# Patient Record
Sex: FEMALE | Race: WHITE | NOT HISPANIC OR LATINO | URBAN - METROPOLITAN AREA
[De-identification: names, ages, dates, MRNs, and addresses within clinical notes are randomized per-mention and may not be internally consistent; named-entity substitution may affect disease eponyms.]

---

## 2017-10-16 ENCOUNTER — APPOINTMENT (OUTPATIENT)
Age: 72
Setting detail: DERMATOLOGY
End: 2017-10-17

## 2017-10-16 DIAGNOSIS — Z85.820 PERSONAL HISTORY OF MALIGNANT MELANOMA OF SKIN: ICD-10-CM

## 2017-10-16 DIAGNOSIS — L57.0 ACTINIC KERATOSIS: ICD-10-CM

## 2017-10-16 DIAGNOSIS — L81.4 OTHER MELANIN HYPERPIGMENTATION: ICD-10-CM

## 2017-10-16 DIAGNOSIS — Z85.828 PERSONAL HISTORY OF OTHER MALIGNANT NEOPLASM OF SKIN: ICD-10-CM

## 2017-10-16 DIAGNOSIS — L82.1 OTHER SEBORRHEIC KERATOSIS: ICD-10-CM

## 2017-10-16 DIAGNOSIS — L57.8 OTHER SKIN CHANGES DUE TO CHRONIC EXPOSURE TO NONIONIZING RADIATION: ICD-10-CM

## 2017-10-16 DIAGNOSIS — D18.0 HEMANGIOMA: ICD-10-CM

## 2017-10-16 PROBLEM — D48.5 NEOPLASM OF UNCERTAIN BEHAVIOR OF SKIN: Status: ACTIVE | Noted: 2017-10-16

## 2017-10-16 PROBLEM — D18.01 HEMANGIOMA OF SKIN AND SUBCUTANEOUS TISSUE: Status: ACTIVE | Noted: 2017-10-16

## 2017-10-16 PROCEDURE — 17000 DESTRUCT PREMALG LESION: CPT

## 2017-10-16 PROCEDURE — OTHER RECOMMENDATIONS: OTHER

## 2017-10-16 PROCEDURE — 11100: CPT | Mod: 59

## 2017-10-16 PROCEDURE — 17003 DESTRUCT PREMALG LES 2-14: CPT

## 2017-10-16 PROCEDURE — OTHER COUNSELING: OTHER

## 2017-10-16 PROCEDURE — 99214 OFFICE O/P EST MOD 30 MIN: CPT | Mod: 25

## 2017-10-16 PROCEDURE — OTHER REASSURANCE: OTHER

## 2017-10-16 PROCEDURE — OTHER TREATMENT REGIMEN: OTHER

## 2017-10-16 PROCEDURE — OTHER BIOPSY BY SHAVE METHOD: OTHER

## 2017-10-16 PROCEDURE — OTHER LIQUID NITROGEN: OTHER

## 2017-10-16 ASSESSMENT — LOCATION ZONE DERM
LOCATION ZONE: TRUNK
LOCATION ZONE: LEG
LOCATION ZONE: NECK
LOCATION ZONE: FACE
LOCATION ZONE: ARM

## 2017-10-16 ASSESSMENT — LOCATION SIMPLE DESCRIPTION DERM
LOCATION SIMPLE: RIGHT PRETIBIAL REGION
LOCATION SIMPLE: LEFT UPPER ARM
LOCATION SIMPLE: LEFT PRETIBIAL REGION
LOCATION SIMPLE: LEFT POSTERIOR UPPER ARM
LOCATION SIMPLE: LEFT FOREARM
LOCATION SIMPLE: CHEST
LOCATION SIMPLE: RIGHT ANTERIOR NECK
LOCATION SIMPLE: RIGHT POSTERIOR UPPER ARM
LOCATION SIMPLE: LEFT CHEEK
LOCATION SIMPLE: RIGHT UPPER BACK
LOCATION SIMPLE: RIGHT FOREARM

## 2017-10-16 ASSESSMENT — LOCATION DETAILED DESCRIPTION DERM
LOCATION DETAILED: LEFT INFERIOR CENTRAL MALAR CHEEK
LOCATION DETAILED: MIDDLE STERNUM
LOCATION DETAILED: RIGHT PROXIMAL POSTERIOR UPPER ARM
LOCATION DETAILED: RIGHT SUPERIOR MEDIAL UPPER BACK
LOCATION DETAILED: LEFT PROXIMAL DORSAL FOREARM
LOCATION DETAILED: RIGHT PROXIMAL PRETIBIAL REGION
LOCATION DETAILED: LEFT ANTERIOR PROXIMAL UPPER ARM
LOCATION DETAILED: RIGHT PROXIMAL DORSAL FOREARM
LOCATION DETAILED: LEFT DISTAL POSTERIOR UPPER ARM
LOCATION DETAILED: LEFT PROXIMAL PRETIBIAL REGION
LOCATION DETAILED: RIGHT INFERIOR ANTERIOR NECK

## 2017-10-16 NOTE — PROCEDURE: RECOMMENDATIONS
Recommendation Preamble: The following recommendations were made during the visit:
Detail Level: Simple
Recommendations (Free Text): GRACY xiong with Tita

## 2018-11-01 ENCOUNTER — APPOINTMENT (OUTPATIENT)
Age: 73
Setting detail: DERMATOLOGY
End: 2018-11-02

## 2018-11-01 DIAGNOSIS — L57.0 ACTINIC KERATOSIS: ICD-10-CM

## 2018-11-01 DIAGNOSIS — D22 MELANOCYTIC NEVI: ICD-10-CM

## 2018-11-01 DIAGNOSIS — L57.8 OTHER SKIN CHANGES DUE TO CHRONIC EXPOSURE TO NONIONIZING RADIATION: ICD-10-CM

## 2018-11-01 DIAGNOSIS — D485 NEOPLASM OF UNCERTAIN BEHAVIOR OF SKIN: ICD-10-CM

## 2018-11-01 DIAGNOSIS — L82.1 OTHER SEBORRHEIC KERATOSIS: ICD-10-CM

## 2018-11-01 DIAGNOSIS — D18.0 HEMANGIOMA: ICD-10-CM

## 2018-11-01 DIAGNOSIS — T300 ERYTHEMA [FIRST DEGREE], UNSPECIFIED SITE: ICD-10-CM

## 2018-11-01 DIAGNOSIS — Z85.828 PERSONAL HISTORY OF OTHER MALIGNANT NEOPLASM OF SKIN: ICD-10-CM

## 2018-11-01 PROBLEM — D18.01 HEMANGIOMA OF SKIN AND SUBCUTANEOUS TISSUE: Status: ACTIVE | Noted: 2018-11-01

## 2018-11-01 PROBLEM — D48.5 NEOPLASM OF UNCERTAIN BEHAVIOR OF SKIN: Status: ACTIVE | Noted: 2018-11-01

## 2018-11-01 PROBLEM — T22.111A BURN OF FIRST DEGREE OF RIGHT FOREARM, INITIAL ENCOUNTER: Status: ACTIVE | Noted: 2018-11-01

## 2018-11-01 PROBLEM — D22.5 MELANOCYTIC NEVI OF TRUNK: Status: ACTIVE | Noted: 2018-11-01

## 2018-11-01 PROCEDURE — 99214 OFFICE O/P EST MOD 30 MIN: CPT | Mod: 25

## 2018-11-01 PROCEDURE — OTHER LIQUID NITROGEN: OTHER

## 2018-11-01 PROCEDURE — 11100: CPT | Mod: 59

## 2018-11-01 PROCEDURE — 17003 DESTRUCT PREMALG LES 2-14: CPT

## 2018-11-01 PROCEDURE — OTHER COUNSELING: OTHER

## 2018-11-01 PROCEDURE — 11101: CPT

## 2018-11-01 PROCEDURE — 17000 DESTRUCT PREMALG LESION: CPT

## 2018-11-01 PROCEDURE — OTHER BIOPSY BY SHAVE METHOD: OTHER

## 2018-11-01 PROCEDURE — OTHER MIPS QUALITY: OTHER

## 2018-11-01 ASSESSMENT — LOCATION ZONE DERM
LOCATION ZONE: TRUNK
LOCATION ZONE: FACE
LOCATION ZONE: ARM
LOCATION ZONE: LEG

## 2018-11-01 ASSESSMENT — LOCATION SIMPLE DESCRIPTION DERM
LOCATION SIMPLE: LEFT POSTERIOR UPPER ARM
LOCATION SIMPLE: RIGHT LOWER BACK
LOCATION SIMPLE: RIGHT UPPER BACK
LOCATION SIMPLE: LEFT UPPER BACK
LOCATION SIMPLE: RIGHT FOREARM
LOCATION SIMPLE: CHEST
LOCATION SIMPLE: RIGHT CALF
LOCATION SIMPLE: INFERIOR FOREHEAD
LOCATION SIMPLE: LEFT FOREHEAD
LOCATION SIMPLE: LEFT PRETIBIAL REGION

## 2018-11-01 ASSESSMENT — LOCATION DETAILED DESCRIPTION DERM
LOCATION DETAILED: INFERIOR MID FOREHEAD
LOCATION DETAILED: LEFT SUPERIOR UPPER BACK
LOCATION DETAILED: LEFT MEDIAL SUPERIOR CHEST
LOCATION DETAILED: RIGHT MID-UPPER BACK
LOCATION DETAILED: LEFT PROXIMAL PRETIBIAL REGION
LOCATION DETAILED: RIGHT INFERIOR MEDIAL MIDBACK
LOCATION DETAILED: LEFT FOREHEAD
LOCATION DETAILED: LEFT DISTAL POSTERIOR UPPER ARM
LOCATION DETAILED: RIGHT PROXIMAL CALF
LOCATION DETAILED: RIGHT VENTRAL DISTAL FOREARM

## 2018-11-01 NOTE — PROCEDURE: BIOPSY BY SHAVE METHOD
Hemostasis: Drysol
Cryotherapy Text: The wound bed was treated with cryotherapy after the biopsy was performed.
Bill 55625 For Specimen Handling/Conveyance To Laboratory?: no
Curettage Text: The wound bed was treated with curettage after the biopsy was performed.
Silver Nitrate Text: The wound bed was treated with silver nitrate after the biopsy was performed.
Electrodesiccation And Curettage Text: The wound bed was treated with electrodesiccation and curettage after the biopsy was performed.
Size Of Lesion In Cm: 0.6
Consent: Written consent was obtained and risks were reviewed including but not limited to scarring, infection, bleeding, scabbing, incomplete removal, nerve damage and allergy to anesthesia.
Dressing: bandage
Electrodesiccation Text: The wound bed was treated with electrodesiccation after the biopsy was performed.
Anesthesia Type: 1% lidocaine with epinephrine
Additional Anesthesia Volume In Cc (Will Not Render If 0): 0
Type Of Destruction Used: Curettage
Was A Bandage Applied: Yes
Depth Of Biopsy: dermis
Post-Care Instructions: I reviewed with the patient in detail post-care instructions. Patient is to keep the biopsy site dry overnight, and then apply bacitracin twice daily until healed. Patient may apply hydrogen peroxide soaks to remove any crusting.
Billing Type: Third-Party Bill
Wound Care: Petrolatum
Notification Instructions: Patient will be notified of biopsy results. However, patient instructed to call the office if not contacted within 2 weeks.
Biopsy Type: H and E
Anesthesia Volume In Cc: 0.5
Biopsy Method: Dermablade
Detail Level: Detailed

## 2018-12-03 ENCOUNTER — APPOINTMENT (OUTPATIENT)
Age: 73
Setting detail: DERMATOLOGY
End: 2018-12-04

## 2018-12-03 PROBLEM — C44.729 SQUAMOUS CELL CARCINOMA OF SKIN OF LEFT LOWER LIMB, INCLUDING HIP: Status: ACTIVE | Noted: 2018-12-03

## 2018-12-03 PROCEDURE — OTHER COUNSELING: OTHER

## 2018-12-03 PROCEDURE — 99213 OFFICE O/P EST LOW 20 MIN: CPT

## 2018-12-03 PROCEDURE — OTHER CONSULTATION FOR RADIOTHERAPY: OTHER

## 2018-12-05 ENCOUNTER — APPOINTMENT (OUTPATIENT)
Age: 73
Setting detail: DERMATOLOGY
End: 2018-12-05

## 2018-12-05 PROBLEM — C44.729 SQUAMOUS CELL CARCINOMA OF SKIN OF LEFT LOWER LIMB, INCLUDING HIP: Status: ACTIVE | Noted: 2018-12-05

## 2018-12-05 PROCEDURE — 77261 THER RADIOLOGY TX PLNG SMPL: CPT

## 2018-12-05 PROCEDURE — OTHER SUPERFICIAL RADIATION TREATMENT: OTHER

## 2018-12-05 PROCEDURE — 77300 RADIATION THERAPY DOSE PLAN: CPT

## 2018-12-05 PROCEDURE — 77401 RADIATION TX DELIVERY SUPFC: CPT

## 2018-12-05 PROCEDURE — 77280 THER RAD SIMULAJ FIELD SMPL: CPT | Mod: 90

## 2018-12-05 PROCEDURE — OTHER FOLLOW UP FOR NEXT VISIT: OTHER

## 2018-12-05 PROCEDURE — OTHER TREATMENT REGIMEN: OTHER

## 2018-12-05 PROCEDURE — 77334 RADIATION TREATMENT AID(S): CPT

## 2018-12-05 PROCEDURE — G6001 ECHO GUIDANCE RADIOTHERAPY: HCPCS

## 2018-12-05 NOTE — PROCEDURE: SUPERFICIAL RADIATION TREATMENT
Total Number Of Fractions Rx 4: 15
Bill For Radiation Treatment: Yes
Field Size (Applicator): 3.0 cm
Port Dimensions-Y Axis In Cm: 2.5
Energy (Optional-Please Include Units): 70kV
Fractions / Week Rx 4: 5
Custom Shielding Afterword Text Will Not Be Included With Simple Simulations (X X Y Cm............): port to correlate with the lesion size, including treatment margin. The custom lead shield is adequate to accommodate the appropriate applicator and provide adequate shielding around the treatment site. Additional shielding (as noted below) is used to protect sensitive, normal tissues.
Initial Radiation Treatment Planning (Will Render If Bill Simulation = Yes): Nila Gonzales had a complete consultation regarding all applicable modalities for the treatment of their skin cancer and based on a variety of factors including the type of tumor, size, and location, the relevant medical history as well as local tissue factors, the functional status of the individual, the ability to perform necessary postoperative wound instructions and the need for simultaneous treatments as well as overall wound healing status, it was determined that the patient would begin radiation therapy treatment for skin cancer.  A full simulation and treatment device design was performed including the determination and formulation of appropriate simple and complex devices including lead shield of 0.762 mm thickness to form molded customized shielding to specifically correlate with the lesion size including treatment margin.  The custom lead shield is adequate to accommodate the appropriate applicator and provide adequate shielding around the treatment site.  The specific field applicator, shields, and devices both simple and complex as well as the specific patient setup is outlined below.  The patient was given a full consent for superficial radiation to both verbally and in writing and the full determination of patient's eligibility for treatment and selection is outlined on the patient eligibility and treatment selection form.  The specific superficial radiotherapy prescription was determined and was documented on the superficial radiotherapy prescription form.  A treatment calculation was also performed and documented on the treatment calculation form.  Based on the prescription, the patient was scheduled for a series of fractional treatments.
Total Number Of Fractions: 13
Render Additional Prescriptions In Note?: No
Shielding Size (Optional- Include Units) Rx 2: 2.5 cm x 2.5cm
Dimensions-X Axis In Cm: 1.5
Dose Per Fractionation In Cgy (Optional): 382.80
Energy (Optional-Please Include Units) Rx 2: 50kV
Shielding Size (Optional- Include Units): 2.5cm x 2.5cm
Number Of Days Off Treatment: 1
Treatment Margins In Cm: 0.5
Custom Shielding Preamble Text Will Not Be Included With Simple Simulations (.......... X X Y Cm): A lead shield of 0.762 mm thickness is utilized to form a molded, custom shield with a
Treatment Device Design After Initial Simulation Justification (Will Render If Bill For Treatment Devices = Yes): Nila Gonzales is status post radiation simulation and is evaluated as to the use of additional devices for shielding and placement for radiation therapy.
Day Of The Week Treatment Administered: Wednesday
Treatment Time / Fractionation (Optional- Include Units) Rx 2: .34
Cumulative Dose In Cgy (Optional): 4976.4
Simple Simulation Preamble Text Will Be Included With Simple Simulations (.......... Indications): Simple simulation was performed today for the following reasons:
Time Dose Fractionation (Optional- Include Units If Applicable): 99
Treatment Time / Fractionation (Optional- Include Units): .60 miin
Detail Level: Detailed
Time Dose Fractionation (Optional- Include Units If Applicable) Rx 2: 64
Intro Statement (Will Not Render If Left Blank): Nila Montanez is undergoing superficial radiation therapy for skin cancer and presents for weekly evaluation and management.  Per protocol and as documented on the flow sheet, the patient was questioned as to subjective redness, pruritus, pain, drainage, fatigue, or any other symptoms.  Objectively, the radiation area was evaluated with regards to erythema, atrophy, scale, crusting, erosion, ulceration, edema, purpura, tenderness, warmth, drainage, and any other findings.  The plan was extensively reviewed including the dose, and dosing schedule.  The simulation and clinical setup was also reviewed as was the external and any internal shields and based on this review the appropriateness and sufficiency of treatment was determined. Initiated SRT.\\nRTOG = 1
Fractions / Week: 2
Functional Status: 0 (fully active)
Fractions / Week Rx 2: 3
Computed Treatment Time In Min (Will Render The Same As Calculated Treatment Time If Left Blank): .61
Treatment Time In Min (Optional): .60
Assessment: Appropriate reaction
Daily Fractionated Dose (Optional- Include Units) Rx 2: 255.96
Patient Positioning: Supine
Total Dose (Optional-Please Include Units) Rx 2: 3789.3
Additional Prescription Justification Text: If there is any interruption in treatment exceeding 5 days please see Decay and Dose Adjustment Calculation and complete treatment under Prescription 2.

## 2018-12-05 NOTE — PROCEDURE: TREATMENT REGIMEN
Plan: Per the request of Nicole Mortensen Pastor, Lynn was seen today for Superficial Radiation Therapy requiring simulation\\n(CPT® 61042) in preparation for treatment of specific diseased site(s). Simulation is necessary to determine\\ncorrect patient and treatment portal positioning, deliver safe and effective radiation therapy. A high frequency\\nultrasound image was acquired today for three dimensional evaluation of tumor volume and response to\\ntreatment, in addition, geometric accuracy of field placement (CPT® ). Physician evaluation of the\\nultrasound tumor depth will be ongoing through course of treatment, and is deemed medically necessary ensuring\\nefficacy of treatment. Today’s image and setup was evaluated determining continuation of treatment with the\\ncurrent plan, or necessary changes as appropriate. All appropriate custom blocking and treatment parameters\\nverified by the Radiation Therapist\\nToday’s visit is for Simulation and planning for radiation therapy. Question were answered and concerns were\\naddress. Patient was evaluated based on listed criteria and is a suitable candidate to begin SRT. Ultrasound\\nwas used to confirm treatment location and determine depth of treatment.\\n
Plan: Evaluation and Management\\n\\nThe patient is undergoing superficial radiation therapy for skin cancer and presents for weekly evaluation and management. Per protocol and as documented on the flow sheet, the patient was questioned as to subjective redness, pruritus, pain, drainage, fatigue, or any other symptoms. Objectively, the radiation area was evaluated with regards to erythema, atrophy, scale, crusting, erosion, ulceration, edema, purpura, tenderness, warmth, drainage, and any other findings. The plan was extensively reviewed including the dose, and dosing schedule. The simulation and clinical setup was also reviewed as was the external and any internal shields and based on this review the appropriateness and sufficiency of treatment was determined.\\n\\nSubjective: \\nRedness\\n\\nAssessment: \\nAppropriate reaction\\n\\nPlan: \\nDose Reviewed\\nDosimetry Reviewed\\nSimulation Reviewed\\nClinical Set-up Reviewed\\nExternal Shields Reviewed\\nContinue Current Treatment Regimen\\n\\n\\n\\nPlan: Treatment Regimen\\n\\nPlan: Per the request of Dr Washington, Patient has been treated today with superficial radiation therapy for non-melanoma skin cancer. \\n\\nWritten informed consent has been previously obtained from this patient for this treatment. This consent is documented in the patient’s chart. The patient gave verbal consent to continue treatment today.\\n\\nThe patient was treated with a specific radiation dose and setup as prescribed by the provider listed on this visit note. A Radiation Therapist performed administration of radiation. The treatment parameters and cumulative dose are indicated above. \\n\\nPrior to administering the radiation, the patient underwent a verification simulation defining relevant normal and abnormal target anatomy, and acquiring images and data necessary to develop optimal radiation treatment process for the patient. This process includes verification of the treatment port(s) and proper treatment positioning. All treatment ports were photographed. The patient’s customized lead blocking was confirmed. Considering, superficial radiotherapy setup is a clinical setup, this requires physician and radiation therapist to clarify location interest being treated against initial images, pathology and patient anatomy. Care was taken ensuring fields treated were geometrically accurate and properly positioned using daily verification simulation. This process is also utilized to determine if any changes are necessary. These steps are therefore medically necessary ensuring safe and effective administration of radiation.\\n\\nA high frequency ultrasound image was acquired today for two-dimensional evaluation of the tumor volume and response to treatment, in addition to geometric accuracy of field placement. The daily field placement is separate and distinct from the initial simulation, and is an important task in providing safe administration of radiation therapy. Physician evaluation of the ultrasound tumor depth will be ongoing throughout the course of treatment, and is deemed medically necessary in order to ensure the efficacy of treatment. Today’s image was evaluated for determination of continuation of treatment with the current plan or with necessary changes as appropriate. Additionally, the use of visualization and targeted assessment allows the patient to be able to see their cancer(s) progress, encouraging patient to complete full course of radiotherapy. \\n\\nPer Dr. Washington, continued daily US guidance and clinical setup simulation is required for field placement, measurement of tumor depth, progress and acute effect monitoring. Evaluation prior to treatment for response and reaction to SRT based on current fraction and cumulative dose with a visual inspection and ultrasound demonstrates a normal expected response. RTOG Acute Radiation Morbidity Score = 1 Superficial Radiation Therapy will continue as planned.
Detail Level: Zone

## 2018-12-07 ENCOUNTER — APPOINTMENT (OUTPATIENT)
Age: 73
Setting detail: DERMATOLOGY
End: 2018-12-07

## 2018-12-07 PROBLEM — C44.729 SQUAMOUS CELL CARCINOMA OF SKIN OF LEFT LOWER LIMB, INCLUDING HIP: Status: ACTIVE | Noted: 2018-12-07

## 2018-12-07 PROCEDURE — OTHER SUPERFICIAL RADIATION TREATMENT: OTHER

## 2018-12-07 PROCEDURE — OTHER TREATMENT REGIMEN: OTHER

## 2018-12-07 PROCEDURE — 77401 RADIATION TX DELIVERY SUPFC: CPT

## 2018-12-07 PROCEDURE — 77280 THER RAD SIMULAJ FIELD SMPL: CPT

## 2018-12-07 PROCEDURE — G6001 ECHO GUIDANCE RADIOTHERAPY: HCPCS

## 2018-12-07 PROCEDURE — OTHER FOLLOW UP FOR NEXT VISIT: OTHER

## 2018-12-07 NOTE — PROCEDURE: SUPERFICIAL RADIATION TREATMENT
Fractionation Number (Evaluation): 5
Daily Fractionated Dose (Optional- Include Units): 382.80
Bill For Radiation Treatment: Yes
Prescription Used: 1
Treatment Margins In Cm: 0.5
Daily Fractionated Dose (Optional- Include Units) Rx 2: 255.96
Total Number Of Fractions Rx 2: 15
Include Rx 4 When Rendering Additional Prescriptions: No
Treatment Time In Min (Optional): .60
Field Size (Applicator): 3.0 cm
Total Dose (Optional-Please Include Units): 4976.4
Total Dose (Optional-Please Include Units) Rx 2: 3789.3
Fractions / Week Rx 2: 3
Energy (Optional-Please Include Units): 70kV
Detail Level: Detailed
Dimensions-X Axis In Cm: 1.5
Shielding Size (Optional- Include Units) Rx 2: 2.5 cm x 2.5cm
Simple Simulation Afterword Text Will Be Included With Simple Simulations (Indications............): Nila Gonzales had a complete consultation regarding all applicable modalities for the treatment of their skin cancer and based on a variety of factors including the type of tumor, size, and location, the relevant medical history as well as local tissue factors, the functional status of the individual, the ability to perform necessary postoperative wound instructions and the need for simultaneous treatments as well as overall wound healing status, it was determined that the patient would begin radiation therapy treatment for skin cancer.  A full simulation and treatment device design was performed including the determination and formulation of appropriate simple and complex devices including lead shield of 0.762 mm thickness to form molded customized shielding to specifically correlate with the lesion size including treatment margin.  The custom lead shield is adequate to accommodate the appropriate applicator and provide adequate shielding around the treatment site.  The specific field applicator, shields, and devices both simple and complex as well as the specific patient setup is outlined below.  The patient was given a full consent for superficial radiation to both verbally and in writing and the full determination of patient's eligibility for treatment and selection is outlined on the patient eligibility and treatment selection form.  The specific superficial radiotherapy prescription was determined and was documented on the superficial radiotherapy prescription form.  A treatment calculation was also performed and documented on the treatment calculation form.  Based on the prescription, the patient was scheduled for a series of fractional treatments.
Port Dimensions-Y Axis In Cm: 2.5
Time Dose Fractionation (Optional- Include Units If Applicable) Rx 2: 64
Custom Shielding Preamble Text Will Not Be Included With Simple Simulations (.......... X X Y Cm): A lead shield of 0.762 mm thickness is utilized to form a molded, custom shield with a
Simple Simulation Preamble Text Will Be Included With Simple Simulations (.......... Indications): Simple simulation was performed today for the following reasons:
Total Number Of Fractions: 13
Patient Positioning: Supine
Time Dose Fractionation (Optional- Include Units If Applicable): 99
Shielding Size (Optional- Include Units): 2.5cm x 2.5cm
Computed Treatment Time In Min (Will Render The Same As Calculated Treatment Time If Left Blank): .61
Assessment: Appropriate reaction
Functional Status: 0 (fully active)
Additional Prescription Justification Text: If there is any interruption in treatment exceeding 5 days please see Decay and Dose Adjustment Calculation and complete treatment under Prescription 2.
Cumulative Dose In Cgy (Optional): 765.60
Day Of The Week Treatment Administered: Friday
Treatment Time / Fractionation (Optional- Include Units) Rx 2: .34
Intro Statement (Will Not Render If Left Blank): Nila Montanez is undergoing superficial radiation therapy for skin cancer and presents for weekly evaluation and management.  Per protocol and as documented on the flow sheet, the patient was questioned as to subjective redness, pruritus, pain, drainage, fatigue, or any other symptoms.  Objectively, the radiation area was evaluated with regards to erythema, atrophy, scale, crusting, erosion, ulceration, edema, purpura, tenderness, warmth, drainage, and any other findings.  The plan was extensively reviewed including the dose, and dosing schedule.  The simulation and clinical setup was also reviewed as was the external and any internal shields and based on this review the appropriateness and sufficiency of treatment was determined. Initiated SRT.\\nRTOG = 1
Treatment Time / Fractionation (Optional- Include Units): .60 miin
Treatment Device Design After Initial Simulation Justification (Will Render If Bill For Treatment Devices = Yes): Nila Gonzales is status post radiation simulation and is evaluated as to the use of additional devices for shielding and placement for radiation therapy.
Energy (Optional-Please Include Units) Rx 2: 50kV
Custom Shielding Afterword Text Will Not Be Included With Simple Simulations (X X Y Cm............): port to correlate with the lesion size, including treatment margin. The custom lead shield is adequate to accommodate the appropriate applicator and provide adequate shielding around the treatment site. Additional shielding (as noted below) is used to protect sensitive, normal tissues.
Fractions / Week: 2

## 2018-12-07 NOTE — PROCEDURE: TREATMENT REGIMEN
Detail Level: Zone
Plan: Evaluation and Management\\n\\nThe patient is undergoing superficial radiation therapy for skin cancer and presents for weekly evaluation and management. Per protocol and as documented on the flow sheet, the patient was questioned as to subjective redness, pruritus, pain, drainage, fatigue, or any other symptoms. Objectively, the radiation area was evaluated with regards to erythema, atrophy, scale, crusting, erosion, ulceration, edema, purpura, tenderness, warmth, drainage, and any other findings. The plan was extensively reviewed including the dose, and dosing schedule. The simulation and clinical setup was also reviewed as was the external and any internal shields and based on this review the appropriateness and sufficiency of treatment was determined.\\n\\nSubjective: \\nRedness\\n\\nAssessment: \\nAppropriate reaction\\n\\nPlan: \\nDose Reviewed\\nDosimetry Reviewed\\nSimulation Reviewed\\nClinical Set-up Reviewed\\nExternal Shields Reviewed\\nContinue Current Treatment Regimen\\n\\n\\n\\nPlan: Treatment Regimen\\n\\nPlan: Per the request of Dr Washington, Patient has been treated today with superficial radiation therapy for non-melanoma skin cancer. \\n\\nWritten informed consent has been previously obtained from this patient for this treatment. This consent is documented in the patient’s chart. The patient gave verbal consent to continue treatment today.\\n\\nThe patient was treated with a specific radiation dose and setup as prescribed by the provider listed on this visit note. A Radiation Therapist performed administration of radiation. The treatment parameters and cumulative dose are indicated above. \\n\\nPrior to administering the radiation, the patient underwent a verification simulation defining relevant normal and abnormal target anatomy, and acquiring images and data necessary to develop optimal radiation treatment process for the patient. This process includes verification of the treatment port(s) and proper treatment positioning. All treatment ports were photographed. The patient’s customized lead blocking was confirmed. Considering, superficial radiotherapy setup is a clinical setup, this requires physician and radiation therapist to clarify location interest being treated against initial images, pathology and patient anatomy. Care was taken ensuring fields treated were geometrically accurate and properly positioned using daily verification simulation. This process is also utilized to determine if any changes are necessary. These steps are therefore medically necessary ensuring safe and effective administration of radiation.\\n\\nA high frequency ultrasound image was acquired today for two-dimensional evaluation of the tumor volume and response to treatment, in addition to geometric accuracy of field placement. The daily field placement is separate and distinct from the initial simulation, and is an important task in providing safe administration of radiation therapy. Physician evaluation of the ultrasound tumor depth will be ongoing throughout the course of treatment, and is deemed medically necessary in order to ensure the efficacy of treatment. Today’s image was evaluated for determination of continuation of treatment with the current plan or with necessary changes as appropriate. Additionally, the use of visualization and targeted assessment allows the patient to be able to see their cancer(s) progress, encouraging patient to complete full course of radiotherapy. \\n\\nPer Dr. Washington, continued daily US guidance and clinical setup simulation is required for field placement, measurement of tumor depth, progress and acute effect monitoring. Evaluation prior to treatment for response and reaction to SRT based on current fraction and cumulative dose with a visual inspection and ultrasound demonstrates a normal expected response. RTOG Acute Radiation Morbidity Score = 1 Superficial Radiation Therapy will continue as planned.  \\nUS Depth 0.52mm, SUSAN Equivocal

## 2018-12-10 ENCOUNTER — APPOINTMENT (OUTPATIENT)
Age: 73
Setting detail: DERMATOLOGY
End: 2018-12-13

## 2018-12-10 PROBLEM — C44.729 SQUAMOUS CELL CARCINOMA OF SKIN OF LEFT LOWER LIMB, INCLUDING HIP: Status: ACTIVE | Noted: 2018-12-10

## 2018-12-10 PROCEDURE — 77401 RADIATION TX DELIVERY SUPFC: CPT

## 2018-12-10 PROCEDURE — G6001 ECHO GUIDANCE RADIOTHERAPY: HCPCS

## 2018-12-10 PROCEDURE — OTHER TREATMENT REGIMEN: OTHER

## 2018-12-10 PROCEDURE — 77280 THER RAD SIMULAJ FIELD SMPL: CPT

## 2018-12-10 PROCEDURE — OTHER SUPERFICIAL RADIATION TREATMENT: OTHER

## 2018-12-10 PROCEDURE — OTHER FOLLOW UP FOR NEXT VISIT: OTHER

## 2018-12-10 NOTE — PROCEDURE: SUPERFICIAL RADIATION TREATMENT
Total Number Of Fractions Rx 4: 15
Include Rx 4 When Rendering Additional Prescriptions: No
Fractions / Week Rx 3: 5
Number Of Treatment Days: 1
Custom Shielding Afterword Text Will Not Be Included With Simple Simulations (X X Y Cm............): port to correlate with the lesion size, including treatment margin. The custom lead shield is adequate to accommodate the appropriate applicator and provide adequate shielding around the treatment site. Additional shielding (as noted below) is used to protect sensitive, normal tissues.
Shielding Size (Optional- Include Units): 2.5cm x 2.5cm
Day Of The Week Treatment Administered: Monday
Field Size (Applicator) Rx 2: 3.0 cm
Energy (Optional-Please Include Units): 70kV
Additional Prescription Justification Text: If there is any interruption in treatment exceeding 5 days please see Decay and Dose Adjustment Calculation and complete treatment under Prescription 2.
Port Dimensions-Y Axis In Cm: 2.5
Functional Status: 0 (fully active)
Patient Positioning: Supine
Fractions / Week Rx 2: 3
Treatment Device Design After Initial Simulation Justification (Will Render If Bill For Treatment Devices = Yes): Nila Gonzales is status post radiation simulation and is evaluated as to the use of additional devices for shielding and placement for radiation therapy.
Total Number Of Fractions: 13
Simple Simulation Preamble Text Will Be Included With Simple Simulations (.......... Indications): Simple simulation was performed today for the following reasons:
Detail Level: Detailed
Treatment Margins In Cm: 0.5
Time Dose Fractionation (Optional- Include Units If Applicable): 99
Treatment Time In Min (Optional): .60
Treatment Time / Fractionation (Optional- Include Units) Rx 2: .34
Initial Radiation Treatment Planning (Will Render If Bill Simulation = Yes): Nila Gonzales had a complete consultation regarding all applicable modalities for the treatment of their skin cancer and based on a variety of factors including the type of tumor, size, and location, the relevant medical history as well as local tissue factors, the functional status of the individual, the ability to perform necessary postoperative wound instructions and the need for simultaneous treatments as well as overall wound healing status, it was determined that the patient would begin radiation therapy treatment for skin cancer.  A full simulation and treatment device design was performed including the determination and formulation of appropriate simple and complex devices including lead shield of 0.762 mm thickness to form molded customized shielding to specifically correlate with the lesion size including treatment margin.  The custom lead shield is adequate to accommodate the appropriate applicator and provide adequate shielding around the treatment site.  The specific field applicator, shields, and devices both simple and complex as well as the specific patient setup is outlined below.  The patient was given a full consent for superficial radiation to both verbally and in writing and the full determination of patient's eligibility for treatment and selection is outlined on the patient eligibility and treatment selection form.  The specific superficial radiotherapy prescription was determined and was documented on the superficial radiotherapy prescription form.  A treatment calculation was also performed and documented on the treatment calculation form.  Based on the prescription, the patient was scheduled for a series of fractional treatments.
Computed Treatment Time In Min (Will Render The Same As Calculated Treatment Time If Left Blank): .61
Total Dose (Optional-Please Include Units): 4976.4
Fractions / Week: 2
Dose / Tx In Cgy (Optional): 382.80
Treatment Time / Fractionation (Optional- Include Units): .60 miin
Dimensions-X Axis In Cm: 1.5
Cumulative Dose In Cgy (Optional): 1148.4
Custom Shielding Preamble Text Will Not Be Included With Simple Simulations (.......... X X Y Cm): A lead shield of 0.762 mm thickness is utilized to form a molded, custom shield with a
Daily Fractionated Dose (Optional- Include Units) Rx 2: 255.96
Assessment: Appropriate reaction
Total Dose (Optional-Please Include Units) Rx 2: 3789.3
Shielding Size (Optional- Include Units) Rx 2: 2.5 cm x 2.5cm
Energy (Optional-Please Include Units) Rx 2: 50kV
Bill For Radiation Treatment: Yes
Time Dose Fractionation (Optional- Include Units If Applicable) Rx 2: 64

## 2018-12-10 NOTE — PROCEDURE: TREATMENT REGIMEN
Detail Level: Zone
Plan: Treatment Regimen\\n\\nPlan: Per the request of Dr Washington, Patient has been treated today with superficial radiation therapy for non-melanoma skin cancer. \\n\\nWritten informed consent has been previously obtained from this patient for this treatment. This consent is documented in the patient’s chart. The patient gave verbal consent to continue treatment today.\\n\\nThe patient was treated with a specific radiation dose and setup as prescribed by the provider listed on this visit note. A Radiation Therapist performed administration of radiation. The treatment parameters and cumulative dose are indicated above. \\n\\nPrior to administering the radiation, the patient underwent a verification simulation defining relevant normal and abnormal target anatomy, and acquiring images and data necessary to develop optimal radiation treatment process for the patient. This process includes verification of the treatment port(s) and proper treatment positioning. All treatment ports were photographed. The patient’s customized lead blocking was confirmed. Considering, superficial radiotherapy setup is a clinical setup, this requires physician and radiation therapist to clarify location interest being treated against initial images, pathology and patient anatomy. Care was taken ensuring fields treated were geometrically accurate and properly positioned using daily verification simulation. This process is also utilized to determine if any changes are necessary. These steps are therefore medically necessary ensuring safe and effective administration of radiation.\\n\\nA high frequency ultrasound image was acquired today for two-dimensional evaluation of the tumor volume and response to treatment, in addition to geometric accuracy of field placement. The daily field placement is separate and distinct from the initial simulation, and is an important task in providing safe administration of radiation therapy. Physician evaluation of the ultrasound tumor depth will be ongoing throughout the course of treatment, and is deemed medically necessary in order to ensure the efficacy of treatment. Today’s image was evaluated for determination of continuation of treatment with the current plan or with necessary changes as appropriate. Additionally, the use of visualization and targeted assessment allows the patient to be able to see their cancer(s) progress, encouraging patient to complete full course of radiotherapy. \\n\\nPer Dr. Washington, continued daily US guidance and clinical setup simulation is required for field placement, measurement of tumor depth, progress and acute effect monitoring. Evaluation prior to treatment for response and reaction to SRT based on current fraction and cumulative dose with a visual inspection and ultrasound demonstrates a normal expected response. RTOG Acute Radiation Morbidity Score = 1 Superficial Radiation Therapy will continue as planned.  \\nUS Depth 1.14mm, Repop ++, SUSAN Equivocal

## 2018-12-14 ENCOUNTER — APPOINTMENT (OUTPATIENT)
Age: 73
Setting detail: DERMATOLOGY
End: 2018-12-17

## 2018-12-14 PROBLEM — C44.729 SQUAMOUS CELL CARCINOMA OF SKIN OF LEFT LOWER LIMB, INCLUDING HIP: Status: ACTIVE | Noted: 2018-12-14

## 2018-12-14 PROCEDURE — G6001 ECHO GUIDANCE RADIOTHERAPY: HCPCS

## 2018-12-14 PROCEDURE — OTHER FOLLOW UP FOR NEXT VISIT: OTHER

## 2018-12-14 PROCEDURE — OTHER TREATMENT REGIMEN: OTHER

## 2018-12-14 PROCEDURE — 77280 THER RAD SIMULAJ FIELD SMPL: CPT

## 2018-12-14 PROCEDURE — 77401 RADIATION TX DELIVERY SUPFC: CPT

## 2018-12-14 PROCEDURE — OTHER SUPERFICIAL RADIATION TREATMENT: OTHER

## 2018-12-14 NOTE — PROCEDURE: TREATMENT REGIMEN
Detail Level: Zone
Plan: Treatment Regimen\\n\\nPlan: Per the request of Dr Washington, Patient has been treated today with superficial radiation therapy for non-melanoma skin cancer. \\n\\nWritten informed consent has been previously obtained from this patient for this treatment. This consent is documented in the patient’s chart. The patient gave verbal consent to continue treatment today.\\n\\nThe patient was treated with a specific radiation dose and setup as prescribed by the provider listed on this visit note. A Radiation Therapist performed administration of radiation. The treatment parameters and cumulative dose are indicated above. \\n\\nPrior to administering the radiation, the patient underwent a verification simulation defining relevant normal and abnormal target anatomy, and acquiring images and data necessary to develop optimal radiation treatment process for the patient. This process includes verification of the treatment port(s) and proper treatment positioning. All treatment ports were photographed. The patient’s customized lead blocking was confirmed. Considering, superficial radiotherapy setup is a clinical setup, this requires physician and radiation therapist to clarify location interest being treated against initial images, pathology and patient anatomy. Care was taken ensuring fields treated were geometrically accurate and properly positioned using daily verification simulation. This process is also utilized to determine if any changes are necessary. These steps are therefore medically necessary ensuring safe and effective administration of radiation.\\n\\nA high frequency ultrasound image was acquired today for two-dimensional evaluation of the tumor volume and response to treatment, in addition to geometric accuracy of field placement. The daily field placement is separate and distinct from the initial simulation, and is an important task in providing safe administration of radiation therapy. Physician evaluation of the ultrasound tumor depth will be ongoing throughout the course of treatment, and is deemed medically necessary in order to ensure the efficacy of treatment. Today’s image was evaluated for determination of continuation of treatment with the current plan or with necessary changes as appropriate. Additionally, the use of visualization and targeted assessment allows the patient to be able to see their cancer(s) progress, encouraging patient to complete full course of radiotherapy. \\n\\nPer Dr. Washington, continued daily US guidance and clinical setup simulation is required for field placement, measurement of tumor depth, progress and acute effect monitoring. Evaluation prior to treatment for response and reaction to SRT based on current fraction and cumulative dose with a visual inspection and ultrasound demonstrates a normal expected response. RTOG Acute Radiation Morbidity Score = 1 Superficial Radiation Therapy will continue as planned.  \\nUS Depth 1.07mm, Repop ++, SUSAN Equivocal

## 2018-12-14 NOTE — PROCEDURE: SUPERFICIAL RADIATION TREATMENT
Fractions / Week Rx 4: 5
Total Number Of Fractions Rx 4: 15
Simple Simulation Preamble Text Will Be Included With Simple Simulations (.......... Indications): Simple simulation was performed today for the following reasons:
Bill For Treatment Devices Only: No
Shielding Size (Optional- Include Units): 2.5cm x 2.5cm
Fractionation Number: 4
Field Size (Applicator): 3.0 cm
Day Of The Week Treatment Administered: Friday
Dose / Tx In Cgy (Optional): 382.80
Energy (Optional-Please Include Units) Rx 2: 50kV
Shielding Size (Optional- Include Units) Rx 2: 2.5 cm x 2.5cm
Energy (Include Units): 70kV
Simple Simulation Afterword Text Will Be Included With Simple Simulations (Indications............): Nila Gonzales had a complete consultation regarding all applicable modalities for the treatment of their skin cancer and based on a variety of factors including the type of tumor, size, and location, the relevant medical history as well as local tissue factors, the functional status of the individual, the ability to perform necessary postoperative wound instructions and the need for simultaneous treatments as well as overall wound healing status, it was determined that the patient would begin radiation therapy treatment for skin cancer.  A full simulation and treatment device design was performed including the determination and formulation of appropriate simple and complex devices including lead shield of 0.762 mm thickness to form molded customized shielding to specifically correlate with the lesion size including treatment margin.  The custom lead shield is adequate to accommodate the appropriate applicator and provide adequate shielding around the treatment site.  The specific field applicator, shields, and devices both simple and complex as well as the specific patient setup is outlined below.  The patient was given a full consent for superficial radiation to both verbally and in writing and the full determination of patient's eligibility for treatment and selection is outlined on the patient eligibility and treatment selection form.  The specific superficial radiotherapy prescription was determined and was documented on the superficial radiotherapy prescription form.  A treatment calculation was also performed and documented on the treatment calculation form.  Based on the prescription, the patient was scheduled for a series of fractional treatments.
Custom Shielding Preamble Text Will Not Be Included With Simple Simulations (.......... X X Y Cm): A lead shield of 0.762 mm thickness is utilized to form a molded, custom shield with a
Fractions / Week Rx 2: 3
Time Dose Fractionation (Optional- Include Units If Applicable): 99
Number Of Days Off Treatment: 1
Treatment Time / Fractionation (Optional- Include Units) Rx 2: .34
Functional Status: 0 (fully active)
Bill For Radiation Treatment: Yes
Port Dimensions-Y Axis In Cm: 2.5
Dimensions-Y Axis In Cm: 1.5
Detail Level: Detailed
Treatment Time In Min (Optional): .60
Total Dose (Optional-Please Include Units) Rx 2: 3789.3
Computed Treatment Time In Min (Will Render The Same As Calculated Treatment Time If Left Blank): .61
Fractions / Week: 2
Total Number Of Fractions: 13
Treatment Margins In Cm: 0.5
Cumulative Dose In Cgy (Optional): 1531.2
Time Dose Fractionation (Optional- Include Units If Applicable) Rx 2: 64
Additional Prescription Justification Text: If there is any interruption in treatment exceeding 5 days please see Decay and Dose Adjustment Calculation and complete treatment under Prescription 2.
Patient Positioning: Supine
Daily Fractionated Dose (Optional- Include Units) Rx 2: 255.96
Treatment Device Design After Initial Simulation Justification (Will Render If Bill For Treatment Devices = Yes): Nila Gonzales is status post radiation simulation and is evaluated as to the use of additional devices for shielding and placement for radiation therapy.
Total Dose (Optional-Please Include Units): 4976.4
Assessment: Appropriate reaction
Custom Shielding Afterword Text Will Not Be Included With Simple Simulations (X X Y Cm............): port to correlate with the lesion size, including treatment margin. The custom lead shield is adequate to accommodate the appropriate applicator and provide adequate shielding around the treatment site. Additional shielding (as noted below) is used to protect sensitive, normal tissues.
Treatment Time / Fractionation (Optional- Include Units): .60 miin

## 2018-12-18 ENCOUNTER — APPOINTMENT (OUTPATIENT)
Age: 73
Setting detail: DERMATOLOGY
End: 2018-12-20

## 2018-12-18 PROBLEM — C44.729 SQUAMOUS CELL CARCINOMA OF SKIN OF LEFT LOWER LIMB, INCLUDING HIP: Status: ACTIVE | Noted: 2018-12-18

## 2018-12-18 PROCEDURE — 77280 THER RAD SIMULAJ FIELD SMPL: CPT

## 2018-12-18 PROCEDURE — 77401 RADIATION TX DELIVERY SUPFC: CPT

## 2018-12-18 PROCEDURE — OTHER FOLLOW UP FOR NEXT VISIT: OTHER

## 2018-12-18 PROCEDURE — 99212 OFFICE O/P EST SF 10 MIN: CPT | Mod: 25

## 2018-12-18 PROCEDURE — G6001 ECHO GUIDANCE RADIOTHERAPY: HCPCS

## 2018-12-18 PROCEDURE — OTHER TREATMENT REGIMEN: OTHER

## 2018-12-18 PROCEDURE — OTHER SUPERFICIAL RADIATION TREATMENT: OTHER

## 2018-12-18 NOTE — PROCEDURE: SUPERFICIAL RADIATION TREATMENT
Dose Per Fractionation In Cgy (Optional): 382.80
Cumulative Dose In Cgy (Optional): 1914.0
Custom Shielding Afterword Text Will Not Be Included With Simple Simulations (X X Y Cm............): port to correlate with the lesion size, including treatment margin. The custom lead shield is adequate to accommodate the appropriate applicator and provide adequate shielding around the treatment site. Additional shielding (as noted below) is used to protect sensitive, normal tissues.
Bill For Radiation Treatment: Yes
Bill For Simulation And Treatment Device Design: No
Initial Radiation Treatment Planning (Will Render If Bill Simulation = Yes): Nila Gonzales had a complete consultation regarding all applicable modalities for the treatment of their skin cancer and based on a variety of factors including the type of tumor, size, and location, the relevant medical history as well as local tissue factors, the functional status of the individual, the ability to perform necessary postoperative wound instructions and the need for simultaneous treatments as well as overall wound healing status, it was determined that the patient would begin radiation therapy treatment for skin cancer.  A full simulation and treatment device design was performed including the determination and formulation of appropriate simple and complex devices including lead shield of 0.762 mm thickness to form molded customized shielding to specifically correlate with the lesion size including treatment margin.  The custom lead shield is adequate to accommodate the appropriate applicator and provide adequate shielding around the treatment site.  The specific field applicator, shields, and devices both simple and complex as well as the specific patient setup is outlined below.  The patient was given a full consent for superficial radiation to both verbally and in writing and the full determination of patient's eligibility for treatment and selection is outlined on the patient eligibility and treatment selection form.  The specific superficial radiotherapy prescription was determined and was documented on the superficial radiotherapy prescription form.  A treatment calculation was also performed and documented on the treatment calculation form.  Based on the prescription, the patient was scheduled for a series of fractional treatments.
Energy (Optional-Please Include Units): 70kV
Total Dose (Optional-Please Include Units) Rx 2: 3789.3
Simple Simulation Preamble Text Will Be Included With Simple Simulations (.......... Indications): Simple simulation was performed today for the following reasons:
Field Size (Applicator): 3.0 cm
Computed Treatment Time In Min (Will Render The Same As Calculated Treatment Time If Left Blank): .61
Prescription Used: 1
Total Number Of Fractions Rx 3: 15
Total Dose (Optional-Please Include Units): 4976.4
Daily Fractionated Dose (Optional- Include Units) Rx 2: 255.96
Patient Positioning: Supine
Treatment Time In Min (Optional): .60
Comments: On target, RTOG 1
Total Number Of Fractions: 13
Fractions / Week Rx 3: 5
Treatment Time / Fractionation (Optional- Include Units) Rx 2: .34
Energy (Optional-Please Include Units) Rx 2: 50kV
Treatment Margins In Cm: 0.5
Dimensions-Y Axis In Cm: 1.5
Time Dose Fractionation (Optional- Include Units If Applicable) Rx 2: 64
Shielding Size (Optional- Include Units): 2.5cm x 2.5cm
Day Of The Week Treatment Administered: Tuesday
Additional Prescription Justification Text: If there is any interruption in treatment exceeding 5 days please see Decay and Dose Adjustment Calculation and complete treatment under Prescription 2.
Port Dimensions-Y Axis In Cm: 2.5
Detail Level: Detailed
Shielding Size (Optional- Include Units) Rx 2: 2.5 cm x 2.5cm
Time Dose Fractionation (Optional- Include Units If Applicable): 99
Custom Shielding Preamble Text Will Not Be Included With Simple Simulations (.......... X X Y Cm): A lead shield of 0.762 mm thickness is utilized to form a molded, custom shield with a
Fractions / Week: 2
Assessment: Appropriate reaction
Fractions / Week Rx 2: 3
Treatment Time / Fractionation (Optional- Include Units): .60 miin
Treatment Device Design After Initial Simulation Justification (Will Render If Bill For Treatment Devices = Yes): Nila Gonzales is status post radiation simulation and is evaluated as to the use of additional devices for shielding and placement for radiation therapy.
Functional Status: 0 (fully active)

## 2018-12-18 NOTE — PROCEDURE: TREATMENT REGIMEN
Detail Level: Zone
Plan: Treatment Regimen\\n\\nPlan: Per the request of Dr Washington, Patient has been treated today with superficial radiation therapy for non-melanoma skin cancer. \\n\\nWritten informed consent has been previously obtained from this patient for this treatment. This consent is documented in the patient’s chart. The patient gave verbal consent to continue treatment today.\\n\\nThe patient was treated with a specific radiation dose and setup as prescribed by the provider listed on this visit note. A Radiation Therapist performed administration of radiation. The treatment parameters and cumulative dose are indicated above. \\n\\nPrior to administering the radiation, the patient underwent a verification simulation defining relevant normal and abnormal target anatomy, and acquiring images and data necessary to develop optimal radiation treatment process for the patient. This process includes verification of the treatment port(s) and proper treatment positioning. All treatment ports were photographed. The patient’s customized lead blocking was confirmed. Considering, superficial radiotherapy setup is a clinical setup, this requires physician and radiation therapist to clarify location interest being treated against initial images, pathology and patient anatomy. Care was taken ensuring fields treated were geometrically accurate and properly positioned using daily verification simulation. This process is also utilized to determine if any changes are necessary. These steps are therefore medically necessary ensuring safe and effective administration of radiation.\\n\\nA high frequency ultrasound image was acquired today for two-dimensional evaluation of the tumor volume and response to treatment, in addition to geometric accuracy of field placement. The daily field placement is separate and distinct from the initial simulation, and is an important task in providing safe administration of radiation therapy. Physician evaluation of the ultrasound tumor depth will be ongoing throughout the course of treatment, and is deemed medically necessary in order to ensure the efficacy of treatment. Today’s image was evaluated for determination of continuation of treatment with the current plan or with necessary changes as appropriate. Additionally, the use of visualization and targeted assessment allows the patient to be able to see their cancer(s) progress, encouraging patient to complete full course of radiotherapy. \\n\\nPer Dr. Washington, continued daily US guidance and clinical setup simulation is required for field placement, measurement of tumor depth, progress and acute effect monitoring. Evaluation prior to treatment for response and reaction to SRT based on current fraction and cumulative dose with a visual inspection and ultrasound demonstrates a normal expected response. RTOG Acute Radiation Morbidity Score = 1 Superficial Radiation Therapy will continue as planned.  \\nUS Depth 0.94mm, Repop ++, SUSAN Equivocal

## 2018-12-21 ENCOUNTER — APPOINTMENT (OUTPATIENT)
Age: 73
Setting detail: DERMATOLOGY
End: 2018-12-21

## 2018-12-21 PROBLEM — C44.729 SQUAMOUS CELL CARCINOMA OF SKIN OF LEFT LOWER LIMB, INCLUDING HIP: Status: ACTIVE | Noted: 2018-12-21

## 2018-12-21 PROCEDURE — 77401 RADIATION TX DELIVERY SUPFC: CPT

## 2018-12-21 PROCEDURE — 77280 THER RAD SIMULAJ FIELD SMPL: CPT

## 2018-12-21 PROCEDURE — OTHER TREATMENT REGIMEN: OTHER

## 2018-12-21 PROCEDURE — OTHER FOLLOW UP FOR NEXT VISIT: OTHER

## 2018-12-21 PROCEDURE — OTHER SUPERFICIAL RADIATION TREATMENT: OTHER

## 2018-12-21 PROCEDURE — G6001 ECHO GUIDANCE RADIOTHERAPY: HCPCS

## 2018-12-21 NOTE — PROCEDURE: SUPERFICIAL RADIATION TREATMENT
Total Number Of Fractions Rx 4: 15
Dose / Tx In Cgy (Optional): 382.80
Functional Status: 0 (fully active)
Total Dose (Optional-Please Include Units) Rx 2: 3789.3
Total Dose (Optional-Please Include Units): 4976.4
Include Rx 3 When Rendering Additional Prescriptions: No
Simple Simulation Preamble Text Will Be Included With Simple Simulations (.......... Indications): Simple simulation was performed today for the following reasons:
Field Size (Applicator): 3.0 cm
Total Number Of Fractions: 13
Shielding Size (Optional- Include Units): 2.5cm x 2.5cm
Bill For Radiation Treatment: Yes
Time Dose Fractionation (Optional- Include Units If Applicable) Rx 2: 64
Detail Level: Detailed
Fractions / Week Rx 2: 3
Treatment Time / Fractionation (Optional- Include Units): .60 miin
Port Dimensions-Y Axis In Cm: 2.5
Number Of Treatment Days: 1
Fractionation Number (Evaluation): 5
Treatment Margins In Cm: 0.5
Cumulative Dose In Cgy (Optional): 2296.8
Dimensions-X Axis In Cm: 1.5
Energy (Include Units): 70kV
Fractionation Number: 6
Treatment Time In Min (Optional): .60
Day Of The Week Treatment Administered: Friday
Treatment Device Design After Initial Simulation Justification (Will Render If Bill For Treatment Devices = Yes): Nila Gonzales is status post radiation simulation and is evaluated as to the use of additional devices for shielding and placement for radiation therapy.
Computed Treatment Time In Min (Will Render The Same As Calculated Treatment Time If Left Blank): .61
Patient Positioning: Supine
Daily Fractionated Dose (Optional- Include Units) Rx 2: 255.96
Time Dose Fractionation (Optional- Include Units If Applicable): 99
Energy (Optional-Please Include Units) Rx 2: 50kV
Additional Prescription Justification Text: If there is any interruption in treatment exceeding 5 days please see Decay and Dose Adjustment Calculation and complete treatment under Prescription 2.
Assessment: Appropriate reaction
Shielding Size (Optional- Include Units) Rx 2: 2.5 cm x 2.5cm
Initial Radiation Treatment Planning (Will Render If Bill Simulation = Yes): Nila Gonzales had a complete consultation regarding all applicable modalities for the treatment of their skin cancer and based on a variety of factors including the type of tumor, size, and location, the relevant medical history as well as local tissue factors, the functional status of the individual, the ability to perform necessary postoperative wound instructions and the need for simultaneous treatments as well as overall wound healing status, it was determined that the patient would begin radiation therapy treatment for skin cancer.  A full simulation and treatment device design was performed including the determination and formulation of appropriate simple and complex devices including lead shield of 0.762 mm thickness to form molded customized shielding to specifically correlate with the lesion size including treatment margin.  The custom lead shield is adequate to accommodate the appropriate applicator and provide adequate shielding around the treatment site.  The specific field applicator, shields, and devices both simple and complex as well as the specific patient setup is outlined below.  The patient was given a full consent for superficial radiation to both verbally and in writing and the full determination of patient's eligibility for treatment and selection is outlined on the patient eligibility and treatment selection form.  The specific superficial radiotherapy prescription was determined and was documented on the superficial radiotherapy prescription form.  A treatment calculation was also performed and documented on the treatment calculation form.  Based on the prescription, the patient was scheduled for a series of fractional treatments.
Treatment Time / Fractionation (Optional- Include Units) Rx 2: .34
Custom Shielding Preamble Text Will Not Be Included With Simple Simulations (.......... X X Y Cm): A lead shield of 0.762 mm thickness is utilized to form a molded, custom shield with a
Comments: On target, RTOG 1
Fractions / Week: 2
Custom Shielding Afterword Text Will Not Be Included With Simple Simulations (X X Y Cm............): port to correlate with the lesion size, including treatment margin. The custom lead shield is adequate to accommodate the appropriate applicator and provide adequate shielding around the treatment site. Additional shielding (as noted below) is used to protect sensitive, normal tissues.

## 2018-12-21 NOTE — PROCEDURE: TREATMENT REGIMEN
Plan: Treatment Regimen\\n\\nPlan: Per the request of Dr Washington, Patient has been treated today with superficial radiation therapy for non-melanoma skin cancer. \\n\\nWritten informed consent has been previously obtained from this patient for this treatment. This consent is documented in the patient’s chart. The patient gave verbal consent to continue treatment today.\\n\\nThe patient was treated with a specific radiation dose and setup as prescribed by the provider listed on this visit note. A Radiation Therapist performed administration of radiation. The treatment parameters and cumulative dose are indicated above. \\n\\nPrior to administering the radiation, the patient underwent a verification simulation defining relevant normal and abnormal target anatomy, and acquiring images and data necessary to develop optimal radiation treatment process for the patient. This process includes verification of the treatment port(s) and proper treatment positioning. All treatment ports were photographed. The patient’s customized lead blocking was confirmed. Considering, superficial radiotherapy setup is a clinical setup, this requires physician and radiation therapist to clarify location interest being treated against initial images, pathology and patient anatomy. Care was taken ensuring fields treated were geometrically accurate and properly positioned using daily verification simulation. This process is also utilized to determine if any changes are necessary. These steps are therefore medically necessary ensuring safe and effective administration of radiation.\\n\\nA high frequency ultrasound image was acquired today for two-dimensional evaluation of the tumor volume and response to treatment, in addition to geometric accuracy of field placement. The daily field placement is separate and distinct from the initial simulation, and is an important task in providing safe administration of radiation therapy. Physician evaluation of the ultrasound tumor depth will be ongoing throughout the course of treatment, and is deemed medically necessary in order to ensure the efficacy of treatment. Today’s image was evaluated for determination of continuation of treatment with the current plan or with necessary changes as appropriate. Additionally, the use of visualization and targeted assessment allows the patient to be able to see their cancer(s) progress, encouraging patient to complete full course of radiotherapy. \\n\\nPer Dr. Washington, continued daily US guidance and clinical setup simulation is required for field placement, measurement of tumor depth, progress and acute effect monitoring. Evaluation prior to treatment for response and reaction to SRT based on current fraction and cumulative dose with a visual inspection and ultrasound demonstrates a normal expected response. RTOG Acute Radiation Morbidity Score = 1 Superficial Radiation Therapy will continue as planned.  \\nUS Depth 1.2mm, Repop ++, SUSAN Equivocal
Detail Level: Zone

## 2018-12-26 ENCOUNTER — APPOINTMENT (OUTPATIENT)
Age: 73
Setting detail: DERMATOLOGY
End: 2018-12-31

## 2018-12-26 PROBLEM — C44.729 SQUAMOUS CELL CARCINOMA OF SKIN OF LEFT LOWER LIMB, INCLUDING HIP: Status: ACTIVE | Noted: 2018-12-26

## 2018-12-26 PROCEDURE — OTHER SUPERFICIAL RADIATION TREATMENT: OTHER

## 2018-12-26 PROCEDURE — 77401 RADIATION TX DELIVERY SUPFC: CPT

## 2018-12-26 PROCEDURE — G6001 ECHO GUIDANCE RADIOTHERAPY: HCPCS

## 2018-12-26 PROCEDURE — 77280 THER RAD SIMULAJ FIELD SMPL: CPT

## 2018-12-26 PROCEDURE — OTHER TREATMENT REGIMEN: OTHER

## 2018-12-26 PROCEDURE — OTHER FOLLOW UP FOR NEXT VISIT: OTHER

## 2018-12-26 NOTE — PROCEDURE: TREATMENT REGIMEN
Detail Level: Zone
Plan: Treatment Regimen\\n\\nPlan: Per the request of Dr Washington, Patient has been treated today with superficial radiation therapy for non-melanoma skin cancer. \\n\\nWritten informed consent has been previously obtained from this patient for this treatment. This consent is documented in the patient’s chart. The patient gave verbal consent to continue treatment today.\\n\\nThe patient was treated with a specific radiation dose and setup as prescribed by the provider listed on this visit note. A Radiation Therapist performed administration of radiation. The treatment parameters and cumulative dose are indicated above. \\n\\nPrior to administering the radiation, the patient underwent a verification simulation defining relevant normal and abnormal target anatomy, and acquiring images and data necessary to develop optimal radiation treatment process for the patient. This process includes verification of the treatment port(s) and proper treatment positioning. All treatment ports were photographed. The patient’s customized lead blocking was confirmed. Considering, superficial radiotherapy setup is a clinical setup, this requires physician and radiation therapist to clarify location interest being treated against initial images, pathology and patient anatomy. Care was taken ensuring fields treated were geometrically accurate and properly positioned using daily verification simulation. This process is also utilized to determine if any changes are necessary. These steps are therefore medically necessary ensuring safe and effective administration of radiation.\\n\\nA high frequency ultrasound image was acquired today for two-dimensional evaluation of the tumor volume and response to treatment, in addition to geometric accuracy of field placement. The daily field placement is separate and distinct from the initial simulation, and is an important task in providing safe administration of radiation therapy. Physician evaluation of the ultrasound tumor depth will be ongoing throughout the course of treatment, and is deemed medically necessary in order to ensure the efficacy of treatment. Today’s image was evaluated for determination of continuation of treatment with the current plan or with necessary changes as appropriate. Additionally, the use of visualization and targeted assessment allows the patient to be able to see their cancer(s) progress, encouraging patient to complete full course of radiotherapy. \\n\\nPer Dr. Washington, continued daily US guidance and clinical setup simulation is required for field placement, measurement of tumor depth, progress and acute effect monitoring. Evaluation prior to treatment for response and reaction to SRT based on current fraction and cumulative dose with a visual inspection and ultrasound demonstrates a normal expected response. RTOG Acute Radiation Morbidity Score = 1 Superficial Radiation Therapy will continue as planned.  \\nUS Depth 1.06mm, Repop ++, SUSAN Equivocal

## 2018-12-26 NOTE — PROCEDURE: SUPERFICIAL RADIATION TREATMENT
Total Number Of Fractions Rx 4: 15
Dimensions-Y Axis In Cm: 1.5
Fractions / Week Rx 3: 5
Bill For Radiation Treatment: Yes
Energy (Include Units): 70kV
Cumulative Dose In Cgy (Optional): 2679.6
Daily Fractionated Dose (Optional- Include Units) Rx 2: 255.96
Total Number Of Fractions: 13
Bill And Render Text From Evaluation And Management Tab (Will Bill 45021): No
Time Dose Fractionation (Optional- Include Units If Applicable) Rx 2: 64
Field Size (Applicator) Rx 2: 3.0 cm
Daily Fractionated Dose (Optional- Include Units): 382.80
Treatment Time In Min (Optional): .60
Total Dose (Optional-Please Include Units) Rx 2: 3789.3
Treatment Margins In Cm: 0.5
Initial Radiation Treatment Planning (Will Render If Bill Simulation = Yes): Nila Gonzales had a complete consultation regarding all applicable modalities for the treatment of their skin cancer and based on a variety of factors including the type of tumor, size, and location, the relevant medical history as well as local tissue factors, the functional status of the individual, the ability to perform necessary postoperative wound instructions and the need for simultaneous treatments as well as overall wound healing status, it was determined that the patient would begin radiation therapy treatment for skin cancer.  A full simulation and treatment device design was performed including the determination and formulation of appropriate simple and complex devices including lead shield of 0.762 mm thickness to form molded customized shielding to specifically correlate with the lesion size including treatment margin.  The custom lead shield is adequate to accommodate the appropriate applicator and provide adequate shielding around the treatment site.  The specific field applicator, shields, and devices both simple and complex as well as the specific patient setup is outlined below.  The patient was given a full consent for superficial radiation to both verbally and in writing and the full determination of patient's eligibility for treatment and selection is outlined on the patient eligibility and treatment selection form.  The specific superficial radiotherapy prescription was determined and was documented on the superficial radiotherapy prescription form.  A treatment calculation was also performed and documented on the treatment calculation form.  Based on the prescription, the patient was scheduled for a series of fractional treatments.
Port Dimensions-X Axis In Cm: 2.5
Detail Level: Detailed
Fractions / Week: 2
Comments: On target, RTOG 1
Treatment Time / Fractionation (Optional- Include Units) Rx 2: .34
Custom Shielding Preamble Text Will Not Be Included With Simple Simulations (.......... X X Y Cm): A lead shield of 0.762 mm thickness is utilized to form a molded, custom shield with a
Total Dose (Optional-Please Include Units): 4976.4
Simple Simulation Preamble Text Will Be Included With Simple Simulations (.......... Indications): Simple simulation was performed today for the following reasons:
Custom Shielding Afterword Text Will Not Be Included With Simple Simulations (X X Y Cm............): port to correlate with the lesion size, including treatment margin. The custom lead shield is adequate to accommodate the appropriate applicator and provide adequate shielding around the treatment site. Additional shielding (as noted below) is used to protect sensitive, normal tissues.
Assessment: Appropriate reaction
Prescription Used: 1
Energy (Optional-Please Include Units) Rx 2: 50kV
Fractionation Number: 7
Treatment Device Design After Initial Simulation Justification (Will Render If Bill For Treatment Devices = Yes): Nila Gonzales is status post radiation simulation and is evaluated as to the use of additional devices for shielding and placement for radiation therapy.
Patient Positioning: Supine
Treatment Time / Fractionation (Optional- Include Units): .60 miin
Day Of The Week Treatment Administered: Wednesday
Time Dose Fractionation (Optional- Include Units If Applicable): 99
Fractions / Week Rx 2: 3
Additional Prescription Justification Text: If there is any interruption in treatment exceeding 5 days please see Decay and Dose Adjustment Calculation and complete treatment under Prescription 2.
Functional Status: 0 (fully active)
Computed Treatment Time In Min (Will Render The Same As Calculated Treatment Time If Left Blank): .61
Shielding Size (Optional- Include Units): 2.5cm x 2.5cm
Shielding Size (Optional- Include Units) Rx 2: 2.5 cm x 2.5cm

## 2018-12-29 ENCOUNTER — APPOINTMENT (OUTPATIENT)
Age: 73
Setting detail: DERMATOLOGY
End: 2018-12-31

## 2018-12-29 PROBLEM — C44.729 SQUAMOUS CELL CARCINOMA OF SKIN OF LEFT LOWER LIMB, INCLUDING HIP: Status: ACTIVE | Noted: 2018-12-29

## 2018-12-29 PROCEDURE — OTHER SUPERFICIAL RADIATION TREATMENT: OTHER

## 2018-12-29 PROCEDURE — 77280 THER RAD SIMULAJ FIELD SMPL: CPT

## 2018-12-29 PROCEDURE — OTHER FOLLOW UP FOR NEXT VISIT: OTHER

## 2018-12-29 PROCEDURE — OTHER TREATMENT REGIMEN: OTHER

## 2018-12-29 PROCEDURE — 77401 RADIATION TX DELIVERY SUPFC: CPT

## 2018-12-29 PROCEDURE — G6001 ECHO GUIDANCE RADIOTHERAPY: HCPCS

## 2018-12-29 NOTE — PROCEDURE: SUPERFICIAL RADIATION TREATMENT
Fractionation Number (Evaluation): 5
Detail Level: Detailed
Bill And Render Text From Evaluation And Management Tab (Will Bill [id number]): No
Fractions / Week: 2
Number Of Treatment Days: 1
Treatment Device Design After Initial Simulation Justification (Will Render If Bill For Treatment Devices = Yes): Nila Gonzales is status post radiation simulation and is evaluated as to the use of additional devices for shielding and placement for radiation therapy.
Energy (Optional-Please Include Units) Rx 2: 50kV
Energy (Optional-Please Include Units): 70kV
Field Size (Applicator): 3.0 cm
Time Dose Fractionation (Optional- Include Units If Applicable): 99
Simple Simulation Preamble Text Will Be Included With Simple Simulations (.......... Indications): Simple simulation was performed today for the following reasons:
Dose Per Fractionation In Cgy (Optional): 382.80
Custom Shielding Afterword Text Will Not Be Included With Simple Simulations (X X Y Cm............): port to correlate with the lesion size, including treatment margin. The custom lead shield is adequate to accommodate the appropriate applicator and provide adequate shielding around the treatment site. Additional shielding (as noted below) is used to protect sensitive, normal tissues.
Dimensions-Y Axis In Cm: 1.5
Total Number Of Fractions Rx 4: 15
Additional Prescription Justification Text: If there is any interruption in treatment exceeding 5 days please see Decay and Dose Adjustment Calculation and complete treatment under Prescription 2.
Shielding Size (Optional- Include Units) Rx 2: 2.5 cm x 2.5cm
Initial Radiation Treatment Planning (Will Render If Bill Simulation = Yes): Nila Gonzales had a complete consultation regarding all applicable modalities for the treatment of their skin cancer and based on a variety of factors including the type of tumor, size, and location, the relevant medical history as well as local tissue factors, the functional status of the individual, the ability to perform necessary postoperative wound instructions and the need for simultaneous treatments as well as overall wound healing status, it was determined that the patient would begin radiation therapy treatment for skin cancer.  A full simulation and treatment device design was performed including the determination and formulation of appropriate simple and complex devices including lead shield of 0.762 mm thickness to form molded customized shielding to specifically correlate with the lesion size including treatment margin.  The custom lead shield is adequate to accommodate the appropriate applicator and provide adequate shielding around the treatment site.  The specific field applicator, shields, and devices both simple and complex as well as the specific patient setup is outlined below.  The patient was given a full consent for superficial radiation to both verbally and in writing and the full determination of patient's eligibility for treatment and selection is outlined on the patient eligibility and treatment selection form.  The specific superficial radiotherapy prescription was determined and was documented on the superficial radiotherapy prescription form.  A treatment calculation was also performed and documented on the treatment calculation form.  Based on the prescription, the patient was scheduled for a series of fractional treatments.
Treatment Time / Fractionation (Optional- Include Units): .60 miin
Cumulative Dose In Cgy (Optional): 3062.4
Port Dimensions-X Axis In Cm: 2.5
Time Dose Fractionation (Optional- Include Units If Applicable) Rx 2: 64
Custom Shielding Preamble Text Will Not Be Included With Simple Simulations (.......... X X Y Cm): A lead shield of 0.762 mm thickness is utilized to form a molded, custom shield with a
Total Number Of Fractions: 13
Total Dose (Optional-Please Include Units): 4976.4
Fractions / Week Rx 2: 3
Treatment Margins In Cm: 0.5
Fractionation Number: 8
Functional Status: 0 (fully active)
Comments: On target, RTOG 1
Computed Treatment Time In Min (Will Render The Same As Calculated Treatment Time If Left Blank): .61
Assessment: Appropriate reaction
Day Of The Week Treatment Administered: Friday
Bill For Radiation Treatment: Yes
Shielding Size (Optional- Include Units): 2.5cm x 2.5cm
Total Dose (Optional-Please Include Units) Rx 2: 3789.3
Treatment Time In Min (Optional): .60
Daily Fractionated Dose (Optional- Include Units) Rx 2: 255.96
Treatment Time / Fractionation (Optional- Include Units) Rx 2: .34
Patient Positioning: Supine

## 2018-12-29 NOTE — PROCEDURE: TREATMENT REGIMEN
Detail Level: Zone
Plan: Treatment Regimen\\n\\nPlan: Per the request of Dr Washington, Patient has been treated today with superficial radiation therapy for non-melanoma skin cancer. \\n\\nWritten informed consent has been previously obtained from this patient for this treatment. This consent is documented in the patient’s chart. The patient gave verbal consent to continue treatment today.\\n\\nThe patient was treated with a specific radiation dose and setup as prescribed by the provider listed on this visit note. A Radiation Therapist performed administration of radiation. The treatment parameters and cumulative dose are indicated above. \\n\\nPrior to administering the radiation, the patient underwent a verification simulation defining relevant normal and abnormal target anatomy, and acquiring images and data necessary to develop optimal radiation treatment process for the patient. This process includes verification of the treatment port(s) and proper treatment positioning. All treatment ports were photographed. The patient’s customized lead blocking was confirmed. Considering, superficial radiotherapy setup is a clinical setup, this requires physician and radiation therapist to clarify location interest being treated against initial images, pathology and patient anatomy. Care was taken ensuring fields treated were geometrically accurate and properly positioned using daily verification simulation. This process is also utilized to determine if any changes are necessary. These steps are therefore medically necessary ensuring safe and effective administration of radiation.\\n\\nA high frequency ultrasound image was acquired today for two-dimensional evaluation of the tumor volume and response to treatment, in addition to geometric accuracy of field placement. The daily field placement is separate and distinct from the initial simulation, and is an important task in providing safe administration of radiation therapy. Physician evaluation of the ultrasound tumor depth will be ongoing throughout the course of treatment, and is deemed medically necessary in order to ensure the efficacy of treatment. Today’s image was evaluated for determination of continuation of treatment with the current plan or with necessary changes as appropriate. Additionally, the use of visualization and targeted assessment allows the patient to be able to see their cancer(s) progress, encouraging patient to complete full course of radiotherapy. \\n\\nPer Dr. Washington, continued daily US guidance and clinical setup simulation is required for field placement, measurement of tumor depth, progress and acute effect monitoring. Evaluation prior to treatment for response and reaction to SRT based on current fraction and cumulative dose with a visual inspection and ultrasound demonstrates a normal expected response. RTOG Acute Radiation Morbidity Score = 1 Superficial Radiation Therapy will continue as planned.  \\nUS Depth 0.97mm, Repop +++, SUSAN Equivocal

## 2019-01-03 ENCOUNTER — APPOINTMENT (OUTPATIENT)
Age: 74
Setting detail: DERMATOLOGY
End: 2019-01-03

## 2019-01-03 PROBLEM — C44.729 SQUAMOUS CELL CARCINOMA OF SKIN OF LEFT LOWER LIMB, INCLUDING HIP: Status: ACTIVE | Noted: 2019-01-03

## 2019-01-03 PROCEDURE — G6001 ECHO GUIDANCE RADIOTHERAPY: HCPCS

## 2019-01-03 PROCEDURE — 77401 RADIATION TX DELIVERY SUPFC: CPT

## 2019-01-03 PROCEDURE — OTHER SUPERFICIAL RADIATION TREATMENT: OTHER

## 2019-01-03 PROCEDURE — OTHER TREATMENT REGIMEN: OTHER

## 2019-01-03 PROCEDURE — OTHER FOLLOW UP FOR NEXT VISIT: OTHER

## 2019-01-03 PROCEDURE — 77280 THER RAD SIMULAJ FIELD SMPL: CPT

## 2019-01-03 NOTE — PROCEDURE: SUPERFICIAL RADIATION TREATMENT
Number Of Days Off Treatment: 1
Field Size (Applicator) Rx 2: 3.0 cm
Detail Level: Detailed
Total Number Of Fractions Rx 2: 15
Shielding Size (Optional- Include Units): 2.5cm x 2.5cm
Dimensions-Y Axis In Cm: 1.5
Treatment Device Design After Initial Simulation Justification (Will Render If Bill For Treatment Devices = Yes): Nila Gonzales is status post radiation simulation and is evaluated as to the use of additional devices for shielding and placement for radiation therapy.
Total Number Of Fractions: 13
Energy (Optional-Please Include Units): 70kV
Total Dose (Optional-Please Include Units): 4976.4
Fractionation Number: 9
Include Rx 2 When Rendering Additional Prescriptions: No
Treatment Time / Fractionation (Optional- Include Units): .60 miin
Additional Prescription Justification Text: If there is any interruption in treatment exceeding 5 days please see Decay and Dose Adjustment Calculation and complete treatment under Prescription 2.
Fractions / Week Rx 3: 5
Initial Radiation Treatment Planning (Will Render If Bill Simulation = Yes): Nila Gonzales had a complete consultation regarding all applicable modalities for the treatment of their skin cancer and based on a variety of factors including the type of tumor, size, and location, the relevant medical history as well as local tissue factors, the functional status of the individual, the ability to perform necessary postoperative wound instructions and the need for simultaneous treatments as well as overall wound healing status, it was determined that the patient would begin radiation therapy treatment for skin cancer.  A full simulation and treatment device design was performed including the determination and formulation of appropriate simple and complex devices including lead shield of 0.762 mm thickness to form molded customized shielding to specifically correlate with the lesion size including treatment margin.  The custom lead shield is adequate to accommodate the appropriate applicator and provide adequate shielding around the treatment site.  The specific field applicator, shields, and devices both simple and complex as well as the specific patient setup is outlined below.  The patient was given a full consent for superficial radiation to both verbally and in writing and the full determination of patient's eligibility for treatment and selection is outlined on the patient eligibility and treatment selection form.  The specific superficial radiotherapy prescription was determined and was documented on the superficial radiotherapy prescription form.  A treatment calculation was also performed and documented on the treatment calculation form.  Based on the prescription, the patient was scheduled for a series of fractional treatments.
Total Dose (Optional-Please Include Units) Rx 2: 3789.3
Bill For Radiation Treatment: Yes
Port Dimensions-Y Axis In Cm: 2.5
Daily Fractionated Dose (Optional- Include Units): 382.80
Time Dose Fractionation (Optional- Include Units If Applicable): 99
Day Of The Week Treatment Administered: Thursday
Shielding Size (Optional- Include Units) Rx 2: 2.5 cm x 2.5cm
Computed Treatment Time In Min (Will Render The Same As Calculated Treatment Time If Left Blank): .61
Treatment Time / Fractionation (Optional- Include Units) Rx 2: .34
Custom Shielding Afterword Text Will Not Be Included With Simple Simulations (X X Y Cm............): port to correlate with the lesion size, including treatment margin. The custom lead shield is adequate to accommodate the appropriate applicator and provide adequate shielding around the treatment site. Additional shielding (as noted below) is used to protect sensitive, normal tissues.
Time Dose Fractionation (Optional- Include Units If Applicable) Rx 2: 64
Patient Positioning: Supine
Energy (Optional-Please Include Units) Rx 2: 50kV
Fractions / Week Rx 2: 3
Comments: On target, RTOG 1
Fractions / Week: 2
Functional Status: 0 (fully active)
Simple Simulation Preamble Text Will Be Included With Simple Simulations (.......... Indications): Simple simulation was performed today for the following reasons:
Custom Shielding Preamble Text Will Not Be Included With Simple Simulations (.......... X X Y Cm): A lead shield of 0.762 mm thickness is utilized to form a molded, custom shield with a
Treatment Margins In Cm: 0.5
Cumulative Dose In Cgy (Optional): 3445.2
Assessment: Appropriate reaction
Daily Fractionated Dose (Optional- Include Units) Rx 2: 255.96
Treatment Time In Min (Optional): .60

## 2019-01-03 NOTE — PROCEDURE: TREATMENT REGIMEN
Plan: Treatment Regimen\\n\\nPlan: Per the request of Dr Washington, Patient has been treated today with superficial radiation therapy for non-melanoma skin cancer. \\n\\nWritten informed consent has been previously obtained from this patient for this treatment. This consent is documented in the patient’s chart. The patient gave verbal consent to continue treatment today.\\n\\nThe patient was treated with a specific radiation dose and setup as prescribed by the provider listed on this visit note. A Radiation Therapist performed administration of radiation. The treatment parameters and cumulative dose are indicated above. \\n\\nPrior to administering the radiation, the patient underwent a verification simulation defining relevant normal and abnormal target anatomy, and acquiring images and data necessary to develop optimal radiation treatment process for the patient. This process includes verification of the treatment port(s) and proper treatment positioning. All treatment ports were photographed. The patient’s customized lead blocking was confirmed. Considering, superficial radiotherapy setup is a clinical setup, this requires physician and radiation therapist to clarify location interest being treated against initial images, pathology and patient anatomy. Care was taken ensuring fields treated were geometrically accurate and properly positioned using daily verification simulation. This process is also utilized to determine if any changes are necessary. These steps are therefore medically necessary ensuring safe and effective administration of radiation.\\n\\nA high frequency ultrasound image was acquired today for two-dimensional evaluation of the tumor volume and response to treatment, in addition to geometric accuracy of field placement. The daily field placement is separate and distinct from the initial simulation, and is an important task in providing safe administration of radiation therapy. Physician evaluation of the ultrasound tumor depth will be ongoing throughout the course of treatment, and is deemed medically necessary in order to ensure the efficacy of treatment. Today’s image was evaluated for determination of continuation of treatment with the current plan or with necessary changes as appropriate. Additionally, the use of visualization and targeted assessment allows the patient to be able to see their cancer(s) progress, encouraging patient to complete full course of radiotherapy. \\n\\nPer Dr. Washington, continued daily US guidance and clinical setup simulation is required for field placement, measurement of tumor depth, progress and acute effect monitoring. Evaluation prior to treatment for response and reaction to SRT based on current fraction and cumulative dose with a visual inspection and ultrasound demonstrates a normal expected response. RTOG Acute Radiation Morbidity Score = 1 Superficial Radiation Therapy will continue as planned.  \\nUS Depth 1.03mm, Repop ++, SUSAN Equivocal
Detail Level: Zone

## 2019-01-07 ENCOUNTER — APPOINTMENT (OUTPATIENT)
Age: 74
Setting detail: DERMATOLOGY
End: 2019-01-14

## 2019-01-07 PROBLEM — C44.729 SQUAMOUS CELL CARCINOMA OF SKIN OF LEFT LOWER LIMB, INCLUDING HIP: Status: ACTIVE | Noted: 2019-01-07

## 2019-01-07 PROCEDURE — 77401 RADIATION TX DELIVERY SUPFC: CPT

## 2019-01-07 PROCEDURE — OTHER SUPERFICIAL RADIATION TREATMENT: OTHER

## 2019-01-07 PROCEDURE — G6001 ECHO GUIDANCE RADIOTHERAPY: HCPCS

## 2019-01-07 PROCEDURE — OTHER FOLLOW UP FOR NEXT VISIT: OTHER

## 2019-01-07 PROCEDURE — OTHER TREATMENT REGIMEN: OTHER

## 2019-01-07 PROCEDURE — 77280 THER RAD SIMULAJ FIELD SMPL: CPT

## 2019-01-07 NOTE — PROCEDURE: TREATMENT REGIMEN
Detail Level: Zone
Plan: Treatment Regimen\\n\\nPlan: Per the request of Dr Washington, Patient has been treated today with superficial radiation therapy for non-melanoma skin cancer. \\n\\nWritten informed consent has been previously obtained from this patient for this treatment. This consent is documented in the patient’s chart. The patient gave verbal consent to continue treatment today.\\n\\nThe patient was treated with a specific radiation dose and setup as prescribed by the provider listed on this visit note. A Radiation Therapist performed administration of radiation. The treatment parameters and cumulative dose are indicated above. \\n\\nPrior to administering the radiation, the patient underwent a verification simulation defining relevant normal and abnormal target anatomy, and acquiring images and data necessary to develop optimal radiation treatment process for the patient. This process includes verification of the treatment port(s) and proper treatment positioning. All treatment ports were photographed. The patient’s customized lead blocking was confirmed. Considering, superficial radiotherapy setup is a clinical setup, this requires physician and radiation therapist to clarify location interest being treated against initial images, pathology and patient anatomy. Care was taken ensuring fields treated were geometrically accurate and properly positioned using daily verification simulation. This process is also utilized to determine if any changes are necessary. These steps are therefore medically necessary ensuring safe and effective administration of radiation.\\n\\nA high frequency ultrasound image was acquired today for two-dimensional evaluation of the tumor volume and response to treatment, in addition to geometric accuracy of field placement. The daily field placement is separate and distinct from the initial simulation, and is an important task in providing safe administration of radiation therapy. Physician evaluation of the ultrasound tumor depth will be ongoing throughout the course of treatment, and is deemed medically necessary in order to ensure the efficacy of treatment. Today’s image was evaluated for determination of continuation of treatment with the current plan or with necessary changes as appropriate. Additionally, the use of visualization and targeted assessment allows the patient to be able to see their cancer(s) progress, encouraging patient to complete full course of radiotherapy. \\n\\nPer Dr. Washington, continued daily US guidance and clinical setup simulation is required for field placement, measurement of tumor depth, progress and acute effect monitoring. Evaluation prior to treatment for response and reaction to SRT based on current fraction and cumulative dose with a visual inspection and ultrasound demonstrates a normal expected response. RTOG Acute Radiation Morbidity Score = 1 Superficial Radiation Therapy will continue as planned.  \\nUS Depth 0.88mm, Repop ++, SUSAN Equivocal

## 2019-01-07 NOTE — PROCEDURE: SUPERFICIAL RADIATION TREATMENT
Time Dose Fractionation (Optional- Include Units If Applicable) Rx 2: 64
Additional Prescription Justification Text: If there is any interruption in treatment exceeding 5 days please see Decay and Dose Adjustment Calculation and complete treatment under Prescription 2.
Dose / Tx In Cgy (Optional): 382.80
Energy (Optional-Please Include Units): 70kV
Bill For Dosimetry/Render Decay And Dose Adjustment Calculation In Note: No
Field Size (Applicator): 3.0 cm
Fractions / Week Rx 3: 5
Comments: On target, RTOG 1
Functional Status: 0 (fully active)
Fractions / Week Rx 2: 3
Custom Shielding Afterword Text Will Not Be Included With Simple Simulations (X X Y Cm............): port to correlate with the lesion size, including treatment margin. The custom lead shield is adequate to accommodate the appropriate applicator and provide adequate shielding around the treatment site. Additional shielding (as noted below) is used to protect sensitive, normal tissues.
Number Of Treatment Days: 1
Port Dimensions-Y Axis In Cm: 2.5
Day Of The Week Treatment Administered: Monday
Total Number Of Fractions: 13
Dimensions-X Axis In Cm: 1.5
Simple Simulation Preamble Text Will Be Included With Simple Simulations (.......... Indications): Simple simulation was performed today for the following reasons:
Patient Positioning: Supine
Render Text From Evaluation And Management Tab (Will Not Bill [id number]): Yes
Daily Fractionated Dose (Optional- Include Units) Rx 2: 255.96
Treatment Margins In Cm: 0.5
Total Dose (Optional-Please Include Units): 4976.4
Treatment Time / Fractionation (Optional- Include Units) Rx 2: .34
Total Number Of Fractions Rx 3: 15
Cumulative Dose In Cgy (Optional): 3828.0
Detail Level: Detailed
Custom Shielding Preamble Text Will Not Be Included With Simple Simulations (.......... X X Y Cm): A lead shield of 0.762 mm thickness is utilized to form a molded, custom shield with a
Treatment Time In Min (Optional): .60
Shielding Size (Optional- Include Units): 2.5cm x 2.5cm
Total Dose (Optional-Please Include Units) Rx 2: 3789.3
Assessment: Appropriate reaction
Energy (Optional-Please Include Units) Rx 2: 50kV
Fractions / Week: 2
Shielding Size (Optional- Include Units) Rx 2: 2.5 cm x 2.5cm
Simple Simulation Afterword Text Will Be Included With Simple Simulations (Indications............): Nila Gonzales had a complete consultation regarding all applicable modalities for the treatment of their skin cancer and based on a variety of factors including the type of tumor, size, and location, the relevant medical history as well as local tissue factors, the functional status of the individual, the ability to perform necessary postoperative wound instructions and the need for simultaneous treatments as well as overall wound healing status, it was determined that the patient would begin radiation therapy treatment for skin cancer.  A full simulation and treatment device design was performed including the determination and formulation of appropriate simple and complex devices including lead shield of 0.762 mm thickness to form molded customized shielding to specifically correlate with the lesion size including treatment margin.  The custom lead shield is adequate to accommodate the appropriate applicator and provide adequate shielding around the treatment site.  The specific field applicator, shields, and devices both simple and complex as well as the specific patient setup is outlined below.  The patient was given a full consent for superficial radiation to both verbally and in writing and the full determination of patient's eligibility for treatment and selection is outlined on the patient eligibility and treatment selection form.  The specific superficial radiotherapy prescription was determined and was documented on the superficial radiotherapy prescription form.  A treatment calculation was also performed and documented on the treatment calculation form.  Based on the prescription, the patient was scheduled for a series of fractional treatments.
Treatment Time / Fractionation (Optional- Include Units): .60 miin
Treatment Device Design After Initial Simulation Justification (Will Render If Bill For Treatment Devices = Yes): Nila Gonzales is status post radiation simulation and is evaluated as to the use of additional devices for shielding and placement for radiation therapy.
Fractionation Number: 10
Computed Treatment Time In Min (Will Render The Same As Calculated Treatment Time If Left Blank): .61
Time Dose Fractionation (Optional- Include Units If Applicable): 99

## 2019-01-10 ENCOUNTER — APPOINTMENT (OUTPATIENT)
Age: 74
Setting detail: DERMATOLOGY
End: 2019-01-14

## 2019-01-10 PROBLEM — C44.729 SQUAMOUS CELL CARCINOMA OF SKIN OF LEFT LOWER LIMB, INCLUDING HIP: Status: ACTIVE | Noted: 2019-01-10

## 2019-01-10 PROCEDURE — OTHER TREATMENT REGIMEN: OTHER

## 2019-01-10 PROCEDURE — G6001 ECHO GUIDANCE RADIOTHERAPY: HCPCS

## 2019-01-10 PROCEDURE — 77401 RADIATION TX DELIVERY SUPFC: CPT

## 2019-01-10 PROCEDURE — OTHER FOLLOW UP FOR NEXT VISIT: OTHER

## 2019-01-10 PROCEDURE — 77280 THER RAD SIMULAJ FIELD SMPL: CPT

## 2019-01-10 PROCEDURE — OTHER SUPERFICIAL RADIATION TREATMENT: OTHER

## 2019-01-10 NOTE — PROCEDURE: SUPERFICIAL RADIATION TREATMENT
Number Of Days Off Treatment: 1
Bill For Dosimetry/Render Treatment Time Calculation In Note: No
Treatment Time / Fractionation (Optional- Include Units): .60 miin
Total Dose (Optional-Please Include Units): 4976.4
Treatment Device Design After Initial Simulation Justification (Will Render If Bill For Treatment Devices = Yes): Nila Gonzales is status post radiation simulation and is evaluated as to the use of additional devices for shielding and placement for radiation therapy.
Field Size (Applicator) Rx 2: 3.0 cm
Dimensions-Y Axis In Cm: 1.5
Total Number Of Fractions Rx 2: 15
Treatment Margins In Cm: 0.5
Port Dimensions-X Axis In Cm: 2.5
Additional Prescription Justification Text: If there is any interruption in treatment exceeding 5 days please see Decay and Dose Adjustment Calculation and complete treatment under Prescription 2.
Time Dose Fractionation (Optional- Include Units If Applicable) Rx 2: 64
Simple Simulation Preamble Text Will Be Included With Simple Simulations (.......... Indications): Simple simulation was performed today for the following reasons:
Treatment Time / Fractionation (Optional- Include Units) Rx 2: .34
Shielding Size (Optional- Include Units) Rx 2: 2.5 cm x 2.5cm
Fractionation Number: 11
Bill For Radiation Treatment: Yes
Time Dose Fractionation (Optional- Include Units If Applicable): 99
Comments: On target, RTOG 1
Fractions / Week: 2
Daily Fractionated Dose (Optional- Include Units): 382.80
Energy (Include Units): 70kV
Custom Shielding Preamble Text Will Not Be Included With Simple Simulations (.......... X X Y Cm): A lead shield of 0.762 mm thickness is utilized to form a molded, custom shield with a
Fractions / Week Rx 2: 3
Computed Treatment Time In Min (Will Render The Same As Calculated Treatment Time If Left Blank): .61
Fractionation Number (Evaluation): 10
Energy (Optional-Please Include Units) Rx 2: 50kV
Shielding Size (Optional- Include Units): 2.5cm x 2.5cm
Fractions / Week Rx 4: 5
Simple Simulation Afterword Text Will Be Included With Simple Simulations (Indications............): Nila Gonzales had a complete consultation regarding all applicable modalities for the treatment of their skin cancer and based on a variety of factors including the type of tumor, size, and location, the relevant medical history as well as local tissue factors, the functional status of the individual, the ability to perform necessary postoperative wound instructions and the need for simultaneous treatments as well as overall wound healing status, it was determined that the patient would begin radiation therapy treatment for skin cancer.  A full simulation and treatment device design was performed including the determination and formulation of appropriate simple and complex devices including lead shield of 0.762 mm thickness to form molded customized shielding to specifically correlate with the lesion size including treatment margin.  The custom lead shield is adequate to accommodate the appropriate applicator and provide adequate shielding around the treatment site.  The specific field applicator, shields, and devices both simple and complex as well as the specific patient setup is outlined below.  The patient was given a full consent for superficial radiation to both verbally and in writing and the full determination of patient's eligibility for treatment and selection is outlined on the patient eligibility and treatment selection form.  The specific superficial radiotherapy prescription was determined and was documented on the superficial radiotherapy prescription form.  A treatment calculation was also performed and documented on the treatment calculation form.  Based on the prescription, the patient was scheduled for a series of fractional treatments.
Day Of The Week Treatment Administered: Thursday
Cumulative Dose In Cgy (Optional): 4210.8
Patient Positioning: Supine
Total Dose (Optional-Please Include Units) Rx 2: 3789.3
Daily Fractionated Dose (Optional- Include Units) Rx 2: 255.96
Assessment: Appropriate reaction
Total Number Of Fractions: 13
Detail Level: Detailed
Treatment Time In Min (Optional): .60
Custom Shielding Afterword Text Will Not Be Included With Simple Simulations (X X Y Cm............): port to correlate with the lesion size, including treatment margin. The custom lead shield is adequate to accommodate the appropriate applicator and provide adequate shielding around the treatment site. Additional shielding (as noted below) is used to protect sensitive, normal tissues.
Functional Status: 0 (fully active)

## 2019-01-10 NOTE — PROCEDURE: TREATMENT REGIMEN
Detail Level: Zone
Plan: Treatment Regimen\\n\\nPlan: Per the request of Dr Washington, Patient has been treated today with superficial radiation therapy for non-melanoma skin cancer. \\n\\nWritten informed consent has been previously obtained from this patient for this treatment. This consent is documented in the patient’s chart. The patient gave verbal consent to continue treatment today.\\n\\nThe patient was treated with a specific radiation dose and setup as prescribed by the provider listed on this visit note. A Radiation Therapist performed administration of radiation. The treatment parameters and cumulative dose are indicated above. \\n\\nPrior to administering the radiation, the patient underwent a verification simulation defining relevant normal and abnormal target anatomy, and acquiring images and data necessary to develop optimal radiation treatment process for the patient. This process includes verification of the treatment port(s) and proper treatment positioning. All treatment ports were photographed. The patient’s customized lead blocking was confirmed. Considering, superficial radiotherapy setup is a clinical setup, this requires physician and radiation therapist to clarify location interest being treated against initial images, pathology and patient anatomy. Care was taken ensuring fields treated were geometrically accurate and properly positioned using daily verification simulation. This process is also utilized to determine if any changes are necessary. These steps are therefore medically necessary ensuring safe and effective administration of radiation.\\n\\nA high frequency ultrasound image was acquired today for two-dimensional evaluation of the tumor volume and response to treatment, in addition to geometric accuracy of field placement. The daily field placement is separate and distinct from the initial simulation, and is an important task in providing safe administration of radiation therapy. Physician evaluation of the ultrasound tumor depth will be ongoing throughout the course of treatment, and is deemed medically necessary in order to ensure the efficacy of treatment. Today’s image was evaluated for determination of continuation of treatment with the current plan or with necessary changes as appropriate. Additionally, the use of visualization and targeted assessment allows the patient to be able to see their cancer(s) progress, encouraging patient to complete full course of radiotherapy. \\n\\nPer Dr. Washington, continued daily US guidance and clinical setup simulation is required for field placement, measurement of tumor depth, progress and acute effect monitoring. Evaluation prior to treatment for response and reaction to SRT based on current fraction and cumulative dose with a visual inspection and ultrasound demonstrates a normal expected response. RTOG Acute Radiation Morbidity Score = 1 Superficial Radiation Therapy will continue as planned.  \\nUS Depth 1.6mm(EDEMA), Repop ++, SUSAN Equivocal (edema and scabbing)

## 2019-01-14 ENCOUNTER — APPOINTMENT (OUTPATIENT)
Age: 74
Setting detail: DERMATOLOGY
End: 2019-01-14

## 2019-01-14 PROCEDURE — OTHER FOLLOW UP FOR NEXT VISIT: OTHER

## 2019-01-14 PROCEDURE — OTHER SUPERFICIAL RADIATION TREATMENT: OTHER

## 2019-01-14 PROCEDURE — OTHER TREATMENT REGIMEN: OTHER

## 2019-01-14 NOTE — PROCEDURE: SUPERFICIAL RADIATION TREATMENT
Treatment Device Design After Initial Simulation Justification (Will Render If Bill For Treatment Devices = Yes): Nila Gonzales is status post radiation simulation and is evaluated as to the use of additional devices for shielding and placement for radiation therapy.
Render Additional Prescriptions In Note?: No
Cumulative Dose In Cgy (Optional): 4593.6
Functional Status: 0 (fully active)
Detail Level: Detailed
Shielding Size (Optional- Include Units): 2.5cm x 2.5cm
Fractionation Number: 12
Additional Prescription Justification Text: If there is any interruption in treatment exceeding 5 days please see Decay and Dose Adjustment Calculation and complete treatment under Prescription 2.
Custom Shielding Afterword Text Will Not Be Included With Simple Simulations (X X Y Cm............): port to correlate with the lesion size, including treatment margin. The custom lead shield is adequate to accommodate the appropriate applicator and provide adequate shielding around the treatment site. Additional shielding (as noted below) is used to protect sensitive, normal tissues.
Dose Per Fractionation In Cgy (Optional): 382.80
Treatment Time / Fractionation (Optional- Include Units): .60 miin
Field Size (Applicator): 3.0 cm
Fractions / Week Rx 3: 5
Simple Simulation Preamble Text Will Be Included With Simple Simulations (.......... Indications): Simple simulation was performed today for the following reasons:
Energy (Include Units): 70kV
Number Of Treatment Days: 1
Treatment Time In Min (Optional): .60
Comments: On target, RTOG 1
Time Dose Fractionation (Optional- Include Units If Applicable): 99
Time Dose Fractionation (Optional- Include Units If Applicable) Rx 2: 64
Patient Positioning: Supine
Port Dimensions-X Axis In Cm: 2.5
Fractions / Week: 2
Simple Simulation Afterword Text Will Be Included With Simple Simulations (Indications............): Nila Gonzales had a complete consultation regarding all applicable modalities for the treatment of their skin cancer and based on a variety of factors including the type of tumor, size, and location, the relevant medical history as well as local tissue factors, the functional status of the individual, the ability to perform necessary postoperative wound instructions and the need for simultaneous treatments as well as overall wound healing status, it was determined that the patient would begin radiation therapy treatment for skin cancer.  A full simulation and treatment device design was performed including the determination and formulation of appropriate simple and complex devices including lead shield of 0.762 mm thickness to form molded customized shielding to specifically correlate with the lesion size including treatment margin.  The custom lead shield is adequate to accommodate the appropriate applicator and provide adequate shielding around the treatment site.  The specific field applicator, shields, and devices both simple and complex as well as the specific patient setup is outlined below.  The patient was given a full consent for superficial radiation to both verbally and in writing and the full determination of patient's eligibility for treatment and selection is outlined on the patient eligibility and treatment selection form.  The specific superficial radiotherapy prescription was determined and was documented on the superficial radiotherapy prescription form.  A treatment calculation was also performed and documented on the treatment calculation form.  Based on the prescription, the patient was scheduled for a series of fractional treatments.
Treatment Margins In Cm: 0.5
Total Number Of Fractions: 13
Total Number Of Fractions Rx 2: 15
Daily Fractionated Dose (Optional- Include Units) Rx 2: 255.96
Custom Shielding Preamble Text Will Not Be Included With Simple Simulations (.......... X X Y Cm): A lead shield of 0.762 mm thickness is utilized to form a molded, custom shield with a
Assessment: Appropriate reaction
Total Dose (Optional-Please Include Units) Rx 2: 3789.3
Dimensions-Y Axis In Cm: 1.5
Fractionation Number (Evaluation): 10
Total Dose (Optional-Please Include Units): 4976.4
Treatment Time / Fractionation (Optional- Include Units) Rx 2: .34
Energy (Optional-Please Include Units) Rx 2: 50kV
Fractions / Week Rx 2: 3
Bill For Radiation Treatment: Yes
Day Of The Week Treatment Administered: Monday
Computed Treatment Time In Min (Will Render The Same As Calculated Treatment Time If Left Blank): .61
Shielding Size (Optional- Include Units) Rx 2: 2.5 cm x 2.5cm

## 2019-01-14 NOTE — PROCEDURE: TREATMENT REGIMEN
Detail Level: Zone
Plan: Treatment Regimen\\n\\nPlan: Per the request of Dr Washington, Patient has been treated today with superficial radiation therapy for non-melanoma skin cancer. \\n\\nWritten informed consent has been previously obtained from this patient for this treatment. This consent is documented in the patient’s chart. The patient gave verbal consent to continue treatment today.\\n\\nThe patient was treated with a specific radiation dose and setup as prescribed by the provider listed on this visit note. A Radiation Therapist performed administration of radiation. The treatment parameters and cumulative dose are indicated above. \\n\\nPrior to administering the radiation, the patient underwent a verification simulation defining relevant normal and abnormal target anatomy, and acquiring images and data necessary to develop optimal radiation treatment process for the patient. This process includes verification of the treatment port(s) and proper treatment positioning. All treatment ports were photographed. The patient’s customized lead blocking was confirmed. Considering, superficial radiotherapy setup is a clinical setup, this requires physician and radiation therapist to clarify location interest being treated against initial images, pathology and patient anatomy. Care was taken ensuring fields treated were geometrically accurate and properly positioned using daily verification simulation. This process is also utilized to determine if any changes are necessary. These steps are therefore medically necessary ensuring safe and effective administration of radiation.\\n\\nA high frequency ultrasound image was acquired today for two-dimensional evaluation of the tumor volume and response to treatment, in addition to geometric accuracy of field placement. The daily field placement is separate and distinct from the initial simulation, and is an important task in providing safe administration of radiation therapy. Physician evaluation of the ultrasound tumor depth will be ongoing throughout the course of treatment, and is deemed medically necessary in order to ensure the efficacy of treatment. Today’s image was evaluated for determination of continuation of treatment with the current plan or with necessary changes as appropriate. Additionally, the use of visualization and targeted assessment allows the patient to be able to see their cancer(s) progress, encouraging patient to complete full course of radiotherapy. \\n\\nPer Dr. Washington, continued daily US guidance and clinical setup simulation is required for field placement, measurement of tumor depth, progress and acute effect monitoring. Evaluation prior to treatment for response and reaction to SRT based on current fraction and cumulative dose with a visual inspection and ultrasound demonstrates a normal expected response. RTOG Acute Radiation Morbidity Score = 1 Superficial Radiation Therapy will continue as planned.  \\nUS Depth 1.6mm(EDEMA), Repop ++, SUSAN Equivocal (edema and scabbing)
never used

## 2019-01-17 ENCOUNTER — APPOINTMENT (OUTPATIENT)
Age: 74
Setting detail: DERMATOLOGY
End: 2019-01-17

## 2019-01-17 PROBLEM — C44.729 SQUAMOUS CELL CARCINOMA OF SKIN OF LEFT LOWER LIMB, INCLUDING HIP: Status: ACTIVE | Noted: 2019-01-17

## 2019-01-17 PROCEDURE — OTHER TREATMENT REGIMEN: OTHER

## 2019-01-17 PROCEDURE — 77401 RADIATION TX DELIVERY SUPFC: CPT

## 2019-01-17 PROCEDURE — OTHER FOLLOW UP FOR NEXT VISIT: OTHER

## 2019-01-17 PROCEDURE — 77280 THER RAD SIMULAJ FIELD SMPL: CPT

## 2019-01-17 PROCEDURE — OTHER SUPERFICIAL RADIATION TREATMENT: OTHER

## 2019-01-17 PROCEDURE — G6001 ECHO GUIDANCE RADIOTHERAPY: HCPCS

## 2019-01-17 NOTE — PROCEDURE: TREATMENT REGIMEN
Detail Level: Zone
Plan: Treatment Regimen\\n\\nPlan: Per the request of Dr Washington, Patient has been treated today with superficial radiation therapy for non-melanoma skin cancer. \\n\\nWritten informed consent has been previously obtained from this patient for this treatment. This consent is documented in the patient’s chart. The patient gave verbal consent to continue treatment today.\\n\\nThe patient was treated with a specific radiation dose and setup as prescribed by the provider listed on this visit note. A Radiation Therapist performed administration of radiation. The treatment parameters and cumulative dose are indicated above. \\n\\nPrior to administering the radiation, the patient underwent a verification simulation defining relevant normal and abnormal target anatomy, and acquiring images and data necessary to develop optimal radiation treatment process for the patient. This process includes verification of the treatment port(s) and proper treatment positioning. All treatment ports were photographed. The patient’s customized lead blocking was confirmed. Considering, superficial radiotherapy setup is a clinical setup, this requires physician and radiation therapist to clarify location interest being treated against initial images, pathology and patient anatomy. Care was taken ensuring fields treated were geometrically accurate and properly positioned using daily verification simulation. This process is also utilized to determine if any changes are necessary. These steps are therefore medically necessary ensuring safe and effective administration of radiation.\\n\\nA high frequency ultrasound image was acquired today for two-dimensional evaluation of the tumor volume and response to treatment, in addition to geometric accuracy of field placement. The daily field placement is separate and distinct from the initial simulation, and is an important task in providing safe administration of radiation therapy. Physician evaluation of the ultrasound tumor depth will be ongoing throughout the course of treatment, and is deemed medically necessary in order to ensure the efficacy of treatment. Today’s image was evaluated for determination of continuation of treatment with the current plan or with necessary changes as appropriate. Additionally, the use of visualization and targeted assessment allows the patient to be able to see their cancer(s) progress, encouraging patient to complete full course of radiotherapy. \\n\\nPer Dr. Washington, continued daily US guidance and clinical setup simulation is required for field placement, measurement of tumor depth, progress and acute effect monitoring. Evaluation prior to treatment for response and reaction to SRT based on current fraction and cumulative dose with a visual inspection and ultrasound demonstrates a normal expected response. RTOG Acute Radiation Morbidity Score = 1 Superficial Radiation Therapy will continue as planned.  \\nUS Depth 1.33mm(EDEMA), Repop ++, SUSAN Equivocal (edema and scabbing)

## 2019-01-17 NOTE — PROCEDURE: SUPERFICIAL RADIATION TREATMENT
Fractions / Week Rx 3: 5
Render Patient Eligibility And Selection In Note?: No
Daily Fractionated Dose (Optional- Include Units) Rx 2: 255.96
Simple Simulation Afterword Text Will Be Included With Simple Simulations (Indications............): Nila Gonzales had a complete consultation regarding all applicable modalities for the treatment of their skin cancer and based on a variety of factors including the type of tumor, size, and location, the relevant medical history as well as local tissue factors, the functional status of the individual, the ability to perform necessary postoperative wound instructions and the need for simultaneous treatments as well as overall wound healing status, it was determined that the patient would begin radiation therapy treatment for skin cancer.  A full simulation and treatment device design was performed including the determination and formulation of appropriate simple and complex devices including lead shield of 0.762 mm thickness to form molded customized shielding to specifically correlate with the lesion size including treatment margin.  The custom lead shield is adequate to accommodate the appropriate applicator and provide adequate shielding around the treatment site.  The specific field applicator, shields, and devices both simple and complex as well as the specific patient setup is outlined below.  The patient was given a full consent for superficial radiation to both verbally and in writing and the full determination of patient's eligibility for treatment and selection is outlined on the patient eligibility and treatment selection form.  The specific superficial radiotherapy prescription was determined and was documented on the superficial radiotherapy prescription form.  A treatment calculation was also performed and documented on the treatment calculation form.  Based on the prescription, the patient was scheduled for a series of fractional treatments.
Field Size (Applicator): 3.0 cm
Port Dimensions-Y Axis In Cm: 2.5
Energy (Optional-Please Include Units): 70kV
Cumulative Dose In Cgy (Optional): 4593.6
Treatment Margins In Cm: 0.5
Detail Level: Detailed
Custom Shielding Preamble Text Will Not Be Included With Simple Simulations (.......... X X Y Cm): A lead shield of 0.762 mm thickness is utilized to form a molded, custom shield with a
Total Number Of Fractions: 13
Treatment Time In Min (Optional): .60
Patient Positioning: Supine
Comments: On target, RTOG 1
Total Dose (Optional-Please Include Units): 4976.4
Daily Fractionated Dose (Optional- Include Units): 382.80
Fractionation Number: 12
Number Of Treatment Days: 1
Treatment Time / Fractionation (Optional- Include Units): .60 miin
Shielding Size (Optional- Include Units): 2.5cm x 2.5cm
Fractions / Week Rx 2: 3
Shielding Size (Optional- Include Units) Rx 2: 2.5 cm x 2.5cm
Treatment Time / Fractionation (Optional- Include Units) Rx 2: .34
Fractionation Number (Evaluation): 10
Bill For Radiation Treatment: Yes
Assessment: Appropriate reaction
Treatment Device Design After Initial Simulation Justification (Will Render If Bill For Treatment Devices = Yes): Nila Gonzales is status post radiation simulation and is evaluated as to the use of additional devices for shielding and placement for radiation therapy.
Energy (Optional-Please Include Units) Rx 2: 50kV
Simple Simulation Preamble Text Will Be Included With Simple Simulations (.......... Indications): Simple simulation was performed today for the following reasons:
Functional Status: 0 (fully active)
Day Of The Week Treatment Administered: Thursday
Fractions / Week: 2
Total Number Of Fractions Rx 2: 15
Total Dose (Optional-Please Include Units) Rx 2: 3789.3
Dimensions-Y Axis In Cm: 1.5
Time Dose Fractionation (Optional- Include Units If Applicable) Rx 2: 64
Custom Shielding Afterword Text Will Not Be Included With Simple Simulations (X X Y Cm............): port to correlate with the lesion size, including treatment margin. The custom lead shield is adequate to accommodate the appropriate applicator and provide adequate shielding around the treatment site. Additional shielding (as noted below) is used to protect sensitive, normal tissues.
Additional Prescription Justification Text: If there is any interruption in treatment exceeding 5 days please see Decay and Dose Adjustment Calculation and complete treatment under Prescription 2.
Time Dose Fractionation (Optional- Include Units If Applicable): 99

## 2019-01-23 ENCOUNTER — APPOINTMENT (OUTPATIENT)
Age: 74
Setting detail: DERMATOLOGY
End: 2019-01-24

## 2019-01-23 PROBLEM — C44.729 SQUAMOUS CELL CARCINOMA OF SKIN OF LEFT LOWER LIMB, INCLUDING HIP: Status: ACTIVE | Noted: 2019-01-23

## 2019-01-23 PROCEDURE — 77280 THER RAD SIMULAJ FIELD SMPL: CPT

## 2019-01-23 PROCEDURE — 77401 RADIATION TX DELIVERY SUPFC: CPT

## 2019-01-23 PROCEDURE — OTHER TREATMENT REGIMEN: OTHER

## 2019-01-23 PROCEDURE — G6001 ECHO GUIDANCE RADIOTHERAPY: HCPCS

## 2019-01-23 PROCEDURE — OTHER FOLLOW UP FOR NEXT VISIT: OTHER

## 2019-01-23 PROCEDURE — OTHER SUPERFICIAL RADIATION TREATMENT: OTHER

## 2019-01-23 NOTE — PROCEDURE: SUPERFICIAL RADIATION TREATMENT
Bill For Simulation And Treatment Device Design: No
Field Size (Applicator): 3.0 cm
Treatment Time / Fractionation (Optional- Include Units): .60 miin
Fractionation Number: 13
Patient Positioning: Supine
Total Number Of Fractions Rx 4: 15
Dose Per Fractionation In Cgy (Optional): 382.80
Initial Radiation Treatment Planning (Will Render If Bill Simulation = Yes): Nila Gonzales had a complete consultation regarding all applicable modalities for the treatment of their skin cancer and based on a variety of factors including the type of tumor, size, and location, the relevant medical history as well as local tissue factors, the functional status of the individual, the ability to perform necessary postoperative wound instructions and the need for simultaneous treatments as well as overall wound healing status, it was determined that the patient would begin radiation therapy treatment for skin cancer.  A full simulation and treatment device design was performed including the determination and formulation of appropriate simple and complex devices including lead shield of 0.762 mm thickness to form molded customized shielding to specifically correlate with the lesion size including treatment margin.  The custom lead shield is adequate to accommodate the appropriate applicator and provide adequate shielding around the treatment site.  The specific field applicator, shields, and devices both simple and complex as well as the specific patient setup is outlined below.  The patient was given a full consent for superficial radiation to both verbally and in writing and the full determination of patient's eligibility for treatment and selection is outlined on the patient eligibility and treatment selection form.  The specific superficial radiotherapy prescription was determined and was documented on the superficial radiotherapy prescription form.  A treatment calculation was also performed and documented on the treatment calculation form.  Based on the prescription, the patient was scheduled for a series of fractional treatments.
Bill For Radiation Treatment: Yes
Cumulative Dose In Cgy (Optional): 4976.4
Fractions / Week Rx 4: 5
Simple Simulation Preamble Text Will Be Included With Simple Simulations (.......... Indications): Simple simulation was performed today for the following reasons:
Port Dimensions-Y Axis In Cm: 2.5
Custom Shielding Afterword Text Will Not Be Included With Simple Simulations (X X Y Cm............): port to correlate with the lesion size, including treatment margin. The custom lead shield is adequate to accommodate the appropriate applicator and provide adequate shielding around the treatment site. Additional shielding (as noted below) is used to protect sensitive, normal tissues.
Energy (Include Units): 70kV
Number Of Days Off Treatment: 1
Dimensions-Y Axis In Cm: 1.5
Total Dose (Optional-Please Include Units) Rx 2: 3789.3
Functional Status: 0 (fully active)
Shielding Size (Optional- Include Units) Rx 2: 2.5 cm x 2.5cm
Time Dose Fractionation (Optional- Include Units If Applicable): 99
Fractions / Week: 2
Custom Shielding Preamble Text Will Not Be Included With Simple Simulations (.......... X X Y Cm): A lead shield of 0.762 mm thickness is utilized to form a molded, custom shield with a
Fractionation Number (Evaluation): 10
Assessment: Appropriate reaction
Treatment Time / Fractionation (Optional- Include Units) Rx 2: .34
Comments: On target, RTOG 1
Detail Level: Detailed
Daily Fractionated Dose (Optional- Include Units) Rx 2: 255.96
Treatment Device Design After Initial Simulation Justification (Will Render If Bill For Treatment Devices = Yes): Nila Gonzales is status post radiation simulation and is evaluated as to the use of additional devices for shielding and placement for radiation therapy.
Treatment Margins In Cm: 0.5
Additional Prescription Justification Text: If there is any interruption in treatment exceeding 5 days please see Decay and Dose Adjustment Calculation and complete treatment under Prescription 2.
Energy (Optional-Please Include Units) Rx 2: 50kV
Fractions / Week Rx 2: 3
Treatment Time In Min (Optional): .60
Day Of The Week Treatment Administered: Wednesday
Shielding Size (Optional- Include Units): 2.5cm x 2.5cm
Time Dose Fractionation (Optional- Include Units If Applicable) Rx 2: 64

## 2019-01-23 NOTE — PROCEDURE: TREATMENT REGIMEN
Plan: Treatment Regimen\\n\\nPlan: Per the request of Dr Washington, Patient has been treated today with superficial radiation therapy for non-melanoma skin cancer. \\n\\nWritten informed consent has been previously obtained from this patient for this treatment. This consent is documented in the patient’s chart. The patient gave verbal consent to continue treatment today.\\n\\nThe patient was treated with a specific radiation dose and setup as prescribed by the provider listed on this visit note. A Radiation Therapist performed administration of radiation. The treatment parameters and cumulative dose are indicated above. \\n\\nPrior to administering the radiation, the patient underwent a verification simulation defining relevant normal and abnormal target anatomy, and acquiring images and data necessary to develop optimal radiation treatment process for the patient. This process includes verification of the treatment port(s) and proper treatment positioning. All treatment ports were photographed. The patient’s customized lead blocking was confirmed. Considering, superficial radiotherapy setup is a clinical setup, this requires physician and radiation therapist to clarify location interest being treated against initial images, pathology and patient anatomy. Care was taken ensuring fields treated were geometrically accurate and properly positioned using daily verification simulation. This process is also utilized to determine if any changes are necessary. These steps are therefore medically necessary ensuring safe and effective administration of radiation.\\n\\nA high frequency ultrasound image was acquired today for two-dimensional evaluation of the tumor volume and response to treatment, in addition to geometric accuracy of field placement. The daily field placement is separate and distinct from the initial simulation, and is an important task in providing safe administration of radiation therapy. Physician evaluation of the ultrasound tumor depth will be ongoing throughout the course of treatment, and is deemed medically necessary in order to ensure the efficacy of treatment. Today’s image was evaluated for determination of continuation of treatment with the current plan or with necessary changes as appropriate. Additionally, the use of visualization and targeted assessment allows the patient to be able to see their cancer(s) progress, encouraging patient to complete full course of radiotherapy. \\n\\nPer Dr. Washington, continued daily US guidance and clinical setup simulation is required for field placement, measurement of tumor depth, progress and acute effect monitoring. Evaluation prior to treatment for response and reaction to SRT based on current fraction and cumulative dose with a visual inspection and ultrasound demonstrates a normal expected response. RTOG Acute Radiation Morbidity Score = 1 Superficial Radiation Therapy will continue as planned.  \\nUS Depth 1.23mm(EDEMA), Repop ++, SUSAN Equivocal (edema and scabbing)
Detail Level: Zone

## 2019-03-11 ENCOUNTER — APPOINTMENT (OUTPATIENT)
Age: 74
Setting detail: DERMATOLOGY
End: 2019-03-17

## 2019-03-11 PROBLEM — C44.729 SQUAMOUS CELL CARCINOMA OF SKIN OF LEFT LOWER LIMB, INCLUDING HIP: Status: ACTIVE | Noted: 2019-03-11

## 2019-03-11 PROBLEM — Z85.828 PERSONAL HISTORY OF OTHER MALIGNANT NEOPLASM OF SKIN: Status: ACTIVE | Noted: 2019-03-11

## 2019-03-11 PROCEDURE — 77427 RADIATION TX MANAGEMENT X5: CPT

## 2019-03-11 PROCEDURE — OTHER SUPERFICIAL RADIATION TREATMENT: OTHER

## 2019-03-11 PROCEDURE — OTHER TREATMENT REGIMEN: OTHER

## 2019-03-11 PROCEDURE — G6001 ECHO GUIDANCE RADIOTHERAPY: HCPCS

## 2019-03-11 PROCEDURE — OTHER FOLLOW UP FOR NEXT VISIT: OTHER

## 2019-03-11 NOTE — PROCEDURE: SUPERFICIAL RADIATION TREATMENT
Fractionation Number (Evaluation): 10
Day Of The Week Treatment Administered: Wednesday
Detail Level: Detailed
Additional Prescription Justification Text: If there is any interruption in treatment exceeding 5 days please see Decay and Dose Adjustment Calculation and complete treatment under Prescription 2.
Assessment: Appropriate reaction
Field Size (Applicator): 3.0 cm
Total Number Of Fractions Rx 3: 15
Bill For Simulation And Treatment Device Design: No
Treatment Time / Fractionation (Optional- Include Units) Rx 2: .34
Treatment Margins In Cm: 0.5
Total Dose (Optional-Please Include Units) Rx 2: 3789.3
Energy (Optional-Please Include Units) Rx 2: 50kV
Custom Shielding Preamble Text Will Not Be Included With Simple Simulations (.......... X X Y Cm): A lead shield of 0.762 mm thickness is utilized to form a molded, custom shield with a
Fractionation Number: 13
Comments: On target, RTOG 1
Energy (Optional-Please Include Units): 70kV
Shielding Size (Optional- Include Units): 2.5cm x 2.5cm
Initial Radiation Treatment Planning (Will Render If Bill Simulation = Yes): Nila Gonzales had a complete consultation regarding all applicable modalities for the treatment of their skin cancer and based on a variety of factors including the type of tumor, size, and location, the relevant medical history as well as local tissue factors, the functional status of the individual, the ability to perform necessary postoperative wound instructions and the need for simultaneous treatments as well as overall wound healing status, it was determined that the patient would begin radiation therapy treatment for skin cancer.  A full simulation and treatment device design was performed including the determination and formulation of appropriate simple and complex devices including lead shield of 0.762 mm thickness to form molded customized shielding to specifically correlate with the lesion size including treatment margin.  The custom lead shield is adequate to accommodate the appropriate applicator and provide adequate shielding around the treatment site.  The specific field applicator, shields, and devices both simple and complex as well as the specific patient setup is outlined below.  The patient was given a full consent for superficial radiation to both verbally and in writing and the full determination of patient's eligibility for treatment and selection is outlined on the patient eligibility and treatment selection form.  The specific superficial radiotherapy prescription was determined and was documented on the superficial radiotherapy prescription form.  A treatment calculation was also performed and documented on the treatment calculation form.  Based on the prescription, the patient was scheduled for a series of fractional treatments.
Port Dimensions-X Axis In Cm: 2.5
Custom Shielding Afterword Text Will Not Be Included With Simple Simulations (X X Y Cm............): port to correlate with the lesion size, including treatment margin. The custom lead shield is adequate to accommodate the appropriate applicator and provide adequate shielding around the treatment site. Additional shielding (as noted below) is used to protect sensitive, normal tissues.
Treatment Time In Min (Optional): .60
Fractions / Week Rx 3: 5
Bill And Render Text From Evaluation And Management Tab (Will Bill 56025): Yes
Time Dose Fractionation (Optional- Include Units If Applicable) Rx 2: 64
Time Dose Fractionation (Optional- Include Units If Applicable): 99
Number Of Treatment Days: 1
Treatment Time / Fractionation (Optional- Include Units): .60 miin
Dimensions-X Axis In Cm: 1.5
Simple Simulation Preamble Text Will Be Included With Simple Simulations (.......... Indications): Simple simulation was performed today for the following reasons:
Daily Fractionated Dose (Optional- Include Units) Rx 2: 255.96
Fractions / Week Rx 2: 3
Shielding Size (Optional- Include Units) Rx 2: 2.5 cm x 2.5cm
Patient Positioning: Supine
Total Dose (Optional-Please Include Units): 4976.4
Fractions / Week: 2
Dose Per Fractionation In Cgy (Optional): 382.80
Functional Status: 0 (fully active)
Treatment Device Design After Initial Simulation Justification (Will Render If Bill For Treatment Devices = Yes): Nila Gonzales is status post radiation simulation and is evaluated as to the use of additional devices for shielding and placement for radiation therapy.

## 2019-03-11 NOTE — PROCEDURE: TREATMENT REGIMEN
Plan: Per the request of Dr. Washington, patient was seen today for Superficial Radiation Therapy management.  A high frequency ultrasound image was acquired prior to treatment today for three dimensional evaluation of tumor volume and response to treatment, in addition, geometric accuracy of field placement (CPT®  ). Physician evaluation of the ultrasound tumor depth is ongoing through treatment, and is deemed medically necessary ensuring efficacy of treatment.\\n\\n6 weeks after finishing SRT, evaluation and management of SRT based on prescription and cumulative dose for reaction and response to radiation reveals adequate healing and response with pleasing aesthetics results.  No evidence of cancerous lesion on or around treatment site visible on ultrasound or dermoscopy. RTOG = 1\\n\\nUS Depth 0.86mm, Repop +++, SUSAN Equivocal
Detail Level: Zone

## 2019-06-18 ENCOUNTER — APPOINTMENT (OUTPATIENT)
Age: 74
Setting detail: DERMATOLOGY
End: 2019-06-18

## 2019-06-18 DIAGNOSIS — D485 NEOPLASM OF UNCERTAIN BEHAVIOR OF SKIN: ICD-10-CM

## 2019-06-18 DIAGNOSIS — D18.0 HEMANGIOMA: ICD-10-CM

## 2019-06-18 DIAGNOSIS — L81.4 OTHER MELANIN HYPERPIGMENTATION: ICD-10-CM

## 2019-06-18 DIAGNOSIS — Z85.828 PERSONAL HISTORY OF OTHER MALIGNANT NEOPLASM OF SKIN: ICD-10-CM

## 2019-06-18 DIAGNOSIS — D22 MELANOCYTIC NEVI: ICD-10-CM

## 2019-06-18 DIAGNOSIS — Z85.820 PERSONAL HISTORY OF MALIGNANT MELANOMA OF SKIN: ICD-10-CM

## 2019-06-18 DIAGNOSIS — L57.0 ACTINIC KERATOSIS: ICD-10-CM

## 2019-06-18 PROBLEM — E78.5 HYPERLIPIDEMIA, UNSPECIFIED: Status: ACTIVE | Noted: 2019-06-18

## 2019-06-18 PROBLEM — D22.5 MELANOCYTIC NEVI OF TRUNK: Status: ACTIVE | Noted: 2019-06-18

## 2019-06-18 PROBLEM — D18.01 HEMANGIOMA OF SKIN AND SUBCUTANEOUS TISSUE: Status: ACTIVE | Noted: 2019-06-18

## 2019-06-18 PROBLEM — D48.5 NEOPLASM OF UNCERTAIN BEHAVIOR OF SKIN: Status: ACTIVE | Noted: 2019-06-18

## 2019-06-18 PROCEDURE — OTHER MIPS QUALITY: OTHER

## 2019-06-18 PROCEDURE — 17000 DESTRUCT PREMALG LESION: CPT | Mod: 59

## 2019-06-18 PROCEDURE — 17003 DESTRUCT PREMALG LES 2-14: CPT

## 2019-06-18 PROCEDURE — OTHER OBSERVATION: OTHER

## 2019-06-18 PROCEDURE — OTHER COUNSELING: OTHER

## 2019-06-18 PROCEDURE — 11102 TANGNTL BX SKIN SINGLE LES: CPT

## 2019-06-18 PROCEDURE — 99214 OFFICE O/P EST MOD 30 MIN: CPT | Mod: 25

## 2019-06-18 PROCEDURE — OTHER LIQUID NITROGEN: OTHER

## 2019-06-18 PROCEDURE — OTHER FOLLOW UP FOR NEXT VISIT: OTHER

## 2019-06-18 PROCEDURE — OTHER BIOPSY BY SHAVE METHOD: OTHER

## 2019-06-18 ASSESSMENT — LOCATION DETAILED DESCRIPTION DERM
LOCATION DETAILED: LEFT PROXIMAL PRETIBIAL REGION
LOCATION DETAILED: RIGHT ANTERIOR PROXIMAL UPPER ARM
LOCATION DETAILED: LEFT SUPERIOR UPPER BACK
LOCATION DETAILED: LEFT PROXIMAL DORSAL FOREARM
LOCATION DETAILED: LEFT DISTAL DORSAL FOREARM
LOCATION DETAILED: RIGHT DISTAL DORSAL FOREARM
LOCATION DETAILED: RIGHT INFERIOR MEDIAL MIDBACK
LOCATION DETAILED: LEFT INFERIOR MEDIAL UPPER BACK
LOCATION DETAILED: RIGHT SUPERIOR UPPER BACK
LOCATION DETAILED: SUPERIOR THORACIC SPINE
LOCATION DETAILED: LEFT DISTAL PRETIBIAL REGION
LOCATION DETAILED: LEFT MEDIAL SUPERIOR CHEST
LOCATION DETAILED: LEFT ANTERIOR DISTAL UPPER ARM
LOCATION DETAILED: RIGHT SUPERIOR MEDIAL UPPER BACK
LOCATION DETAILED: RIGHT DISTAL PRETIBIAL REGION

## 2019-06-18 ASSESSMENT — LOCATION SIMPLE DESCRIPTION DERM
LOCATION SIMPLE: RIGHT FOREARM
LOCATION SIMPLE: RIGHT UPPER ARM
LOCATION SIMPLE: LEFT UPPER BACK
LOCATION SIMPLE: CHEST
LOCATION SIMPLE: LEFT PRETIBIAL REGION
LOCATION SIMPLE: LEFT FOREARM
LOCATION SIMPLE: RIGHT LOWER BACK
LOCATION SIMPLE: UPPER BACK
LOCATION SIMPLE: LEFT UPPER ARM
LOCATION SIMPLE: RIGHT UPPER BACK
LOCATION SIMPLE: RIGHT PRETIBIAL REGION

## 2019-06-18 ASSESSMENT — LOCATION ZONE DERM
LOCATION ZONE: ARM
LOCATION ZONE: TRUNK
LOCATION ZONE: LEG

## 2019-06-18 NOTE — PROCEDURE: LIQUID NITROGEN
Consent: The patient's consent was obtained including but not limited to risks of crusting, scabbing, blistering, scarring, darker or lighter pigmentary change, recurrence, incomplete removal and infection.
Render Note In Bullet Format When Appropriate: No
Duration Of Freeze Thaw-Cycle (Seconds): 2
Detail Level: Simple
Post-Care Instructions: I reviewed with the patient in detail post-care instructions. Patient is to wear sunprotection, and avoid picking at any of the treated lesions. Pt may apply Vaseline to crusted or scabbing areas.
Number Of Freeze-Thaw Cycles: 2 freeze-thaw cycles

## 2020-01-21 NOTE — PROCEDURE: LIQUID NITROGEN
Detail Level: Simple
Post-Care Instructions: I reviewed with the patient in detail post-care instructions. Patient is to wear sunprotection, and avoid picking at any of the treated lesions. Pt may apply Vaseline to crusted or scabbing areas.
6
Duration Of Freeze Thaw-Cycle (Seconds): 2
Render Post-Care Instructions In Note?: no
Number Of Freeze-Thaw Cycles: 2 freeze-thaw cycles
Consent: The patient's consent was obtained including but not limited to risks of crusting, scabbing, blistering, scarring, darker or lighter pigmentary change, recurrence, incomplete removal and infection.

## 2021-04-07 ENCOUNTER — APPOINTMENT (OUTPATIENT)
Age: 76
Setting detail: DERMATOLOGY
End: 2021-04-07

## 2021-04-07 VITALS — TEMPERATURE: 98 F

## 2021-04-07 DIAGNOSIS — L81.4 OTHER MELANIN HYPERPIGMENTATION: ICD-10-CM

## 2021-04-07 DIAGNOSIS — T07XXXA ABRASION OR FRICTION BURN OF OTHER, MULTIPLE, AND UNSPECIFIED SITES, WITHOUT MENTION OF INFECTION: ICD-10-CM

## 2021-04-07 DIAGNOSIS — L82.1 OTHER SEBORRHEIC KERATOSIS: ICD-10-CM

## 2021-04-07 DIAGNOSIS — L57.0 ACTINIC KERATOSIS: ICD-10-CM

## 2021-04-07 DIAGNOSIS — Z71.89 OTHER SPECIFIED COUNSELING: ICD-10-CM

## 2021-04-07 DIAGNOSIS — D22 MELANOCYTIC NEVI: ICD-10-CM

## 2021-04-07 DIAGNOSIS — L57.8 OTHER SKIN CHANGES DUE TO CHRONIC EXPOSURE TO NONIONIZING RADIATION: ICD-10-CM

## 2021-04-07 DIAGNOSIS — Z85.820 PERSONAL HISTORY OF MALIGNANT MELANOMA OF SKIN: ICD-10-CM

## 2021-04-07 DIAGNOSIS — D18.0 HEMANGIOMA: ICD-10-CM

## 2021-04-07 DIAGNOSIS — Z85.828 PERSONAL HISTORY OF OTHER MALIGNANT NEOPLASM OF SKIN: ICD-10-CM

## 2021-04-07 PROBLEM — D48.5 NEOPLASM OF UNCERTAIN BEHAVIOR OF SKIN: Status: ACTIVE | Noted: 2021-04-07

## 2021-04-07 PROBLEM — D18.01 HEMANGIOMA OF SKIN AND SUBCUTANEOUS TISSUE: Status: ACTIVE | Noted: 2021-04-07

## 2021-04-07 PROBLEM — D22.5 MELANOCYTIC NEVI OF TRUNK: Status: ACTIVE | Noted: 2021-04-07

## 2021-04-07 PROBLEM — E78.5 HYPERLIPIDEMIA, UNSPECIFIED: Status: ACTIVE | Noted: 2021-04-07

## 2021-04-07 PROBLEM — S80.812A ABRASION, LEFT LOWER LEG, INITIAL ENCOUNTER: Status: ACTIVE | Noted: 2021-04-07

## 2021-04-07 PROCEDURE — OTHER OBSERVATION AND MEASURE: OTHER

## 2021-04-07 PROCEDURE — 99213 OFFICE O/P EST LOW 20 MIN: CPT | Mod: 25

## 2021-04-07 PROCEDURE — OTHER SUNSCREEN RECOMMENDATIONS: OTHER

## 2021-04-07 PROCEDURE — OTHER SHAVE REMOVAL: OTHER

## 2021-04-07 PROCEDURE — OTHER COUNSELING: OTHER

## 2021-04-07 PROCEDURE — OTHER ADDITIONAL NOTES: OTHER

## 2021-04-07 PROCEDURE — OTHER OBSERVATION: OTHER

## 2021-04-07 PROCEDURE — 11302 SHAVE SKIN LESION 1.1-2.0 CM: CPT | Mod: 59

## 2021-04-07 PROCEDURE — OTHER LIQUID NITROGEN: OTHER

## 2021-04-07 PROCEDURE — 17004 DESTROY PREMAL LESIONS 15/>: CPT

## 2021-04-07 ASSESSMENT — LOCATION SIMPLE DESCRIPTION DERM
LOCATION SIMPLE: RIGHT FOREARM
LOCATION SIMPLE: LEFT POSTERIOR UPPER ARM
LOCATION SIMPLE: RIGHT SCALP
LOCATION SIMPLE: LEFT FOREARM
LOCATION SIMPLE: RIGHT POSTERIOR UPPER ARM
LOCATION SIMPLE: CHEST
LOCATION SIMPLE: CHEST
LOCATION SIMPLE: RIGHT FOREARM
LOCATION SIMPLE: LEFT UPPER ARM
LOCATION SIMPLE: RIGHT POSTERIOR UPPER ARM
LOCATION SIMPLE: LEFT PRETIBIAL REGION
LOCATION SIMPLE: RIGHT BUTTOCK
LOCATION SIMPLE: LEFT ELBOW
LOCATION SIMPLE: LEFT TEMPLE
LOCATION SIMPLE: RIGHT FOREHEAD
LOCATION SIMPLE: LEFT UPPER BACK
LOCATION SIMPLE: LEFT ELBOW
LOCATION SIMPLE: LEFT SCALP
LOCATION SIMPLE: LEFT PRETIBIAL REGION
LOCATION SIMPLE: LEFT POSTERIOR UPPER ARM
LOCATION SIMPLE: LEFT CHEEK
LOCATION SIMPLE: RIGHT CHEEK
LOCATION SIMPLE: LEFT FOREARM
LOCATION SIMPLE: NOSE
LOCATION SIMPLE: RIGHT BUTTOCK
LOCATION SIMPLE: LEFT CHEEK

## 2021-04-07 ASSESSMENT — LOCATION DETAILED DESCRIPTION DERM
LOCATION DETAILED: LEFT MEDIAL SUPERIOR CHEST
LOCATION DETAILED: RIGHT MEDIAL SUPERIOR CHEST
LOCATION DETAILED: RIGHT LATERAL BUTTOCK
LOCATION DETAILED: RIGHT PROXIMAL DORSAL FOREARM
LOCATION DETAILED: RIGHT PROXIMAL POSTERIOR UPPER ARM
LOCATION DETAILED: LEFT ELBOW
LOCATION DETAILED: RIGHT INFERIOR FOREHEAD
LOCATION DETAILED: RIGHT DISTAL DORSAL FOREARM
LOCATION DETAILED: LEFT DISTAL PRETIBIAL REGION
LOCATION DETAILED: LEFT DISTAL PRETIBIAL REGION
LOCATION DETAILED: RIGHT CENTRAL FRONTAL SCALP
LOCATION DETAILED: RIGHT MEDIAL FRONTAL SCALP
LOCATION DETAILED: RIGHT LATERAL SUPERIOR CHEST
LOCATION DETAILED: LEFT CENTRAL MALAR CHEEK
LOCATION DETAILED: LEFT ELBOW
LOCATION DETAILED: LEFT LATERAL TEMPLE
LOCATION DETAILED: LEFT PROXIMAL DORSAL FOREARM
LOCATION DETAILED: RIGHT SUPERIOR LATERAL FOREHEAD
LOCATION DETAILED: LEFT DISTAL POSTERIOR UPPER ARM
LOCATION DETAILED: RIGHT PROXIMAL POSTERIOR UPPER ARM
LOCATION DETAILED: LEFT MEDIAL FRONTAL SCALP
LOCATION DETAILED: RIGHT CENTRAL MALAR CHEEK
LOCATION DETAILED: LEFT PROXIMAL PRETIBIAL REGION
LOCATION DETAILED: LEFT SUPERIOR CENTRAL BUCCAL CHEEK
LOCATION DETAILED: LEFT MEDIAL INFERIOR CHEST
LOCATION DETAILED: UPPER STERNUM
LOCATION DETAILED: LEFT DISTAL DORSAL FOREARM
LOCATION DETAILED: LEFT MID-UPPER BACK
LOCATION DETAILED: LEFT MEDIAL SUPERIOR CHEST
LOCATION DETAILED: UPPER STERNUM
LOCATION DETAILED: LEFT ANTERIOR DISTAL UPPER ARM
LOCATION DETAILED: LEFT DISTAL POSTERIOR UPPER ARM
LOCATION DETAILED: LEFT PROXIMAL DORSAL FOREARM
LOCATION DETAILED: NASAL TIP
LOCATION DETAILED: RIGHT LATERAL BUTTOCK
LOCATION DETAILED: RIGHT LATERAL MALAR CHEEK
LOCATION DETAILED: RIGHT PROXIMAL DORSAL FOREARM

## 2021-04-07 ASSESSMENT — LOCATION ZONE DERM
LOCATION ZONE: ARM
LOCATION ZONE: ARM
LOCATION ZONE: LEG
LOCATION ZONE: TRUNK
LOCATION ZONE: FACE
LOCATION ZONE: SCALP
LOCATION ZONE: FACE
LOCATION ZONE: NOSE
LOCATION ZONE: LEG
LOCATION ZONE: TRUNK

## 2021-04-07 NOTE — PROCEDURE: SHAVE REMOVAL
Bill [id number] For Specimen Handling/Conveyance To Laboratory?: no
Was A Bandage Applied: Yes
Size Of Lesion In Cm (Required): 1.7
Path Notes (To The Dermatopathologist): Please check margins.
Wound Care: Antibiotic ointment
Biopsy Method: Dermablade
Detail Level: Detailed
X Size Of Lesion In Cm (Optional): 0
Anesthesia Volume In Cc: 1.5
Hemostasis: Drysol
Post-Care Instructions: I reviewed with the patient in detail post-care instructions. Patient is to keep the biopsy site dry overnight, and then apply aquaphor or vaseline twice daily until healed.
Medical Necessity Information: It is in your best interest to select a reason for this procedure from the list below. All of these items fulfill various CMS LCD requirements except the new and changing color options.
Medical Necessity Clause: This procedure was medically necessary because the lesion that was treated was:
Notification Instructions: Patient will be notified of pathology results. However, patient instructed to call the office if not contacted within 2 weeks.
Anesthesia Type: 1% lidocaine with 1:100,000 epinephrine and a 1:3 solution of 8.4% sodium bicarbonate
Billing Type: Third-Party Bill
Consent was obtained from the patient. The risks and benefits to therapy were discussed in detail. Specifically, the risks of infection, scarring, bleeding, prolonged wound healing, incomplete removal, allergy to anesthesia, nerve injury and recurrence were addressed. Prior to the procedure, the treatment site was clearly identified and confirmed by the patient. All components of Universal Protocol/PAUSE Rule completed.

## 2021-04-07 NOTE — PROCEDURE: ADDITIONAL NOTES
Render Risk Assessment In Note?: no
Detail Level: Simple
Additional Notes: Advised patient to have biopsy today, given location of lesion and her history of melanoma, however patient declined biopsy and opted for observation instead.

## 2021-04-29 ENCOUNTER — APPOINTMENT (OUTPATIENT)
Age: 76
Setting detail: DERMATOLOGY
End: 2021-05-05

## 2021-04-29 PROBLEM — C44.722 SQUAMOUS CELL CARCINOMA OF SKIN OF RIGHT LOWER LIMB, INCLUDING HIP: Status: ACTIVE | Noted: 2021-04-29

## 2021-04-29 PROBLEM — Z85.820 PERSONAL HISTORY OF MALIGNANT MELANOMA OF SKIN: Status: ACTIVE | Noted: 2021-04-29

## 2021-04-29 PROCEDURE — OTHER TREATMENT REGIMEN: OTHER

## 2021-04-29 PROCEDURE — 77280 THER RAD SIMULAJ FIELD SMPL: CPT

## 2021-04-29 PROCEDURE — 99213 OFFICE O/P EST LOW 20 MIN: CPT | Mod: 25

## 2021-04-29 PROCEDURE — G6001 ECHO GUIDANCE RADIOTHERAPY: HCPCS

## 2021-04-29 PROCEDURE — 77300 RADIATION THERAPY DOSE PLAN: CPT

## 2021-04-29 PROCEDURE — OTHER SUPERFICIAL RADIATION TREATMENT: OTHER

## 2021-04-29 PROCEDURE — 77334 RADIATION TREATMENT AID(S): CPT

## 2021-04-29 PROCEDURE — 77261 THER RADIOLOGY TX PLNG SMPL: CPT

## 2021-04-29 NOTE — PROCEDURE: SUPERFICIAL RADIATION TREATMENT
Treatment Margins In Cm: 0.5
Prescription Used: 1
Port Dimensions-X Axis In Cm: 3
Dimensions-X Axis In Cm: 2
Energy (Optional-Please Include Units): 70kV
Patient Positioning: Supine
Initial Radiation Treatment Planning (Will Render If Bill Simulation = Yes): The patient had a complete consultation regarding all applicable modalities for the treatment of their skin cancer and based on a variety of factors including the type of tumor, size, and location, the relevant medical history as well as local tissue factors, the functional status of the individual, the ability to perform necessary postoperative wound instructions and the need for simultaneous treatments as well as overall wound healing status, it was determined that the patient would begin radiation therapy treatment for skin cancer.  A full simulation and treatment device design was performed including the determination and formulation of appropriate simple and complex devices including lead shield of 0.762 mm thickness to form molded customized shielding to specifically correlate with the lesion size including treatment margin.  The custom lead shield is adequate to accommodate the appropriate applicator and provide adequate shielding around the treatment site.  The specific field applicator, shields, and devices both simple and complex as well as the specific patient setup is outlined below.  The patient was given a full consent for superficial radiation to both verbally and in writing and the full determination of patient's eligibility for treatment and selection is outlined on the patient eligibility and treatment selection form.  The specific superficial radiotherapy prescription was determined and was documented on the superficial radiotherapy prescription form.  A treatment calculation was also performed and documented on the treatment calculation form.  Based on the prescription, the patient was scheduled for a series of fractional treatments.
Total Dose (Optional-Please Include Units): 5510.4
Render Text From Evaluation And Management Tab (Will Not Bill [id number]): No
Bill For Dosimetry/Render Treatment Time Calculation In Note: Yes
Simple Simulation Preamble Text Will Be Included With Simple Simulations (.......... Indications): Simple simulation was performed today for the following reasons:
Intro Statement (Will Not Render If Left Blank): The patient is undergoing superficial radiation therapy for skin cancer and presents for weekly evaluation and management.  Per protocol and as documented on the flow sheet, the patient was questioned as to subjective redness, pruritus, pain, drainage, fatigue, or any other symptoms.  Objectively, the radiation area was evaluated with regards to erythema, atrophy, scale, crusting, erosion, ulceration, edema, purpura, tenderness, warmth, drainage, and any other findings.  The plan was extensively reviewed including the dose, and dosing schedule.  The simulation and clinical setup was also reviewed as was the external and any internal shields and based on this review the appropriateness and sufficiency of treatment was determined.
Additional Prescription Justification Text: If there is any interruption in treatment exceeding 5 days please see Decay and Dose Adjustment Calculation and complete treatment under Prescription 2, 3 or 4 as appropriate.
Treatment Time / Fractionation (Optional- Include Units): 0.41
Dose Per Fractionation In Cgy (Optional): 275.52
Detail Level: Detailed
Fractionation Number (Evaluation): 5
Depth (Optional-Please Include Units): 2.12mm (scab
Total Number Of Fractions: 20
Total Number Of Fractions Rx 2: 15
Field Size (Applicator): 4.0 cm
Time Dose Fractionation (Optional- Include Units If Applicable): 98
Custom Shielding Preamble Text Will Not Be Included With Simple Simulations (.......... X X Y Cm): A lead shield of 0.762 mm thickness is utilized to form a molded, custom shield with a
Information: Selecting Yes will display possible errors in your note based on the variables you have selected. This validation is only offered as a suggestion for you. PLEASE NOTE THAT THE VALIDATION TEXT WILL BE REMOVED WHEN YOU FINALIZE YOUR NOTE. IF YOU WANT TO FAX A PRELIMINARY NOTE YOU WILL NEED TO TOGGLE THIS TO 'NO' IF YOU DO NOT WANT IT IN YOUR FAXED NOTE.
Fractionation Number: 0
Treatment Device Design After Initial Simulation Justification (Will Render If Bill For Treatment Devices = Yes): The patient is status post radiation simulation and is evaluated as to the use of additional devices for shielding and placement for radiation therapy.
Assessment: Appropriate reaction
Shielding Size (Optional- Include Units): 3x3
Functional Status: 0 (fully active)
Custom Shielding Afterword Text Will Not Be Included With Simple Simulations (X X Y Cm............): port to correlate with the lesion size, including treatment margin. The custom lead shield is adequate to accommodate the appropriate applicator and provide adequate shielding around the treatment site. Additional shielding (as noted below) is used to protect sensitive, normal tissues.

## 2021-04-29 NOTE — PROCEDURE: TREATMENT REGIMEN
Detail Level: Zone
Plan: Per the request of Dr. Winter, patient was seen today for Superficial Radiation Therapy requiring simulation (CPT® 94852) in preparation for treatment of specific diseased site(s). Simulation is necessary to determine correct patient and treatment portal positioning, deliver safe and effective radiation therapy. A high frequency ultrasound image was acquired today for three-dimensional evaluation of tumor volume and response to treatment, in addition, geometric accuracy of field placement (CPT® ). Physician evaluation of the ultrasound tumor depth will be ongoing through course of treatment and is deemed medically necessary ensuring efficacy of treatment. Today’s image and setup was evaluated determining continuation of treatment with the current plan, or necessary changes as appropriate. All appropriate custom blocking and treatment parameters verified by the Radiation Therapist.\\n\\nToday’s visit is for Simulation and planning for radiation therapy.  Questions were answered and concerns were address.  Patient was evaluated based on listed criteria and is a suitable candidate to begin SRT.  Ultrasound was used to confirm treatment location and determine depth of treatment.  Depth: 2.12mm\\n\\nDepth due to scab on wound which will be removed prior to first treatment. Treatment will be adjusted as needed pending new depth that day

## 2021-05-10 ENCOUNTER — APPOINTMENT (OUTPATIENT)
Age: 76
Setting detail: DERMATOLOGY
End: 2021-05-14

## 2021-05-10 PROBLEM — C44.722 SQUAMOUS CELL CARCINOMA OF SKIN OF RIGHT LOWER LIMB, INCLUDING HIP: Status: ACTIVE | Noted: 2021-05-10

## 2021-05-10 PROCEDURE — OTHER SUPERFICIAL RADIATION TREATMENT: OTHER

## 2021-05-10 PROCEDURE — 77280 THER RAD SIMULAJ FIELD SMPL: CPT

## 2021-05-10 PROCEDURE — 77401 RADIATION TX DELIVERY SUPFC: CPT

## 2021-05-10 PROCEDURE — 77300 RADIATION THERAPY DOSE PLAN: CPT

## 2021-05-10 PROCEDURE — G6001 ECHO GUIDANCE RADIOTHERAPY: HCPCS

## 2021-05-10 PROCEDURE — OTHER TREATMENT REGIMEN: OTHER

## 2021-05-10 NOTE — PROCEDURE: SUPERFICIAL RADIATION TREATMENT
Port Dimensions-Y Axis In Cm: 3
Include Rx 4 When Rendering Additional Prescriptions: Yes
Render Patient Eligibility And Selection In Note?: No
Functional Status: 0 (fully active)
Fractions / Week Rx 2: 5
Treatment Device Design After Initial Simulation Justification (Will Render If Bill For Treatment Devices = Yes): The patient is status post radiation simulation and is evaluated as to the use of additional devices for shielding and placement for radiation therapy.
Total Number Of Fractions Rx 4: 15
Dose Per Fractionation In Cgy (Optional): 272.84
Energy (Include Units): 100
Initial Radiation Treatment Planning (Will Render If Bill Simulation = Yes): The patient had a complete consultation regarding all applicable modalities for the treatment of their skin cancer and based on a variety of factors including the type of tumor, size, and location, the relevant medical history as well as local tissue factors, the functional status of the individual, the ability to perform necessary postoperative wound instructions and the need for simultaneous treatments as well as overall wound healing status, it was determined that the patient would begin radiation therapy treatment for skin cancer.  A full simulation and treatment device design was performed including the determination and formulation of appropriate simple and complex devices including lead shield of 0.762 mm thickness to form molded customized shielding to specifically correlate with the lesion size including treatment margin.  The custom lead shield is adequate to accommodate the appropriate applicator and provide adequate shielding around the treatment site.  The specific field applicator, shields, and devices both simple and complex as well as the specific patient setup is outlined below.  The patient was given a full consent for superficial radiation to both verbally and in writing and the full determination of patient's eligibility for treatment and selection is outlined on the patient eligibility and treatment selection form.  The specific superficial radiotherapy prescription was determined and was documented on the superficial radiotherapy prescription form.  A treatment calculation was also performed and documented on the treatment calculation form.  Based on the prescription, the patient was scheduled for a series of fractional treatments.
Intro Statement (Will Not Render If Left Blank): The patient is undergoing superficial radiation therapy for skin cancer and presents for weekly evaluation and management.  Per protocol and as documented on the flow sheet, the patient was questioned as to subjective redness, pruritus, pain, drainage, fatigue, or any other symptoms.  Objectively, the radiation area was evaluated with regards to erythema, atrophy, scale, crusting, erosion, ulceration, edema, purpura, tenderness, warmth, drainage, and any other findings.  The plan was extensively reviewed including the dose, and dosing schedule.  The simulation and clinical setup was also reviewed as was the external and any internal shields and based on this review the appropriateness and sufficiency of treatment was determined.
Time Dose Fractionation (Optional- Include Units If Applicable): 97
Dimensions-Y Axis In Cm: 2
Patient Positioning: Supine
Simple Simulation Preamble Text Will Be Included With Simple Simulations (.......... Indications): Simple simulation was performed today for the following reasons:
Detail Level: Detailed
Additional Prescription Justification Text: If there is any interruption in treatment exceeding 5 days please see Decay and Dose Adjustment Calculation and complete treatment under Prescription 2, 3 or 4 as appropriate.
Field Number: 1
Custom Shielding Preamble Text Will Not Be Included With Simple Simulations (.......... X X Y Cm): A lead shield of 0.762 mm thickness is utilized to form a molded, custom shield with a
Depth (Optional-Please Include Units): 2.3mm
Treatment Margins In Cm: 0.5
Total Dose (Optional-Please Include Units): 5456.8 cGy
Computed Treatment Time In Min (Will Render The Same As Calculated Treatment Time If Left Blank): 0.38
Custom Shielding Afterword Text Will Not Be Included With Simple Simulations (X X Y Cm............): port to correlate with the lesion size, including treatment margin. The custom lead shield is adequate to accommodate the appropriate applicator and provide adequate shielding around the treatment site. Additional shielding (as noted below) is used to protect sensitive, normal tissues.
Energy (Optional-Please Include Units): 100kV
Assessment: Appropriate reaction
Shielding Size (Optional- Include Units): 3x3
Field Size (Applicator): 4.0 cm
Total Number Of Fractions: 20
Information: Selecting Yes will display possible errors in your note based on the variables you have selected. This validation is only offered as a suggestion for you. PLEASE NOTE THAT THE VALIDATION TEXT WILL BE REMOVED WHEN YOU FINALIZE YOUR NOTE. IF YOU WANT TO FAX A PRELIMINARY NOTE YOU WILL NEED TO TOGGLE THIS TO 'NO' IF YOU DO NOT WANT IT IN YOUR FAXED NOTE.

## 2021-05-10 NOTE — PROCEDURE: TREATMENT REGIMEN
Modify Regimen: 5/11/2021 - a scab has fallen off the lesion allowing for a more accurate sonogram to be taken prior to treatment today.  A more accurate lesion depth of 2.3mm was measured today and the treatment was adjusted accordingly.  Energy was increased to 100kV and daily dose changed to 272.84cGy.  A dose calculation was preformed
Plan: \\nThis patient has been treated today with superficial radiation therapy for non-melanoma skin cancer.  Written informed consent has been previously obtained from this patient for this treatment.  This consent is documented in the patient’s chart.  The patient gave verbal consent to continue treatment today. \\nThe patient was treated with a specific radiation dose and setup as prescribed by the provider listed on this visit note.  A Radiation Therapist performed administration of radiation. The treatment parameters and cumulative dose are indicated above.\\nPrior to administering the radiation, the patient underwent a verification simulation defining relevant normal and abnormal target anatomy and acquiring images and data necessary to develop optimal radiation treatment process for the patient. This process includes verification of the treatment port(s) and proper treatment positioning.  All treatment ports were photographed.  The patient’s customized lead blocking was confirmed.   Considering, superficial radiotherapy setup is a clinical setup, this requires physician and radiation therapist to clarify location interest being treated against initial images, pathology and patient anatomy. Care was taken ensuring fields treated were geometrically accurate and properly positioned using daily verification simulation.  This process is also utilized to determine if any changes are necessary.   These steps are therefore medically necessary ensuring safe and effective administration of radiation. \\nA high frequency ultrasound image was acquired today for two-dimensional evaluation of the tumor volume and response to treatment, in addition to geometric accuracy of field placement. The daily field placement is separate and distinct from the initial simulation and is an important task in providing safe administration of radiation therapy. Physician evaluation of the ultrasound tumor depth will be ongoing throughout the course of treatment and is deemed medically necessary in order to ensure the efficacy of treatment. Today’s image was evaluated for determination of continuation of treatment with the current plan or with necessary changes as appropriate. Additionally, the use of visualization and targeted assessment allows the patient to be able to see their cancer(s) progress, encouraging patient to complete full course of radiotherapy. \\nUS Depth of 2.3mm with 0.18mm change from previous imaging.  Repop: ++, SUSAN equivocal\\nPer Dr. Winter, continued daily US guidance and clinical setup simulation is required for field placement, measurement of tumor depth, progress and acute effect monitoring. \\n
Detail Level: Zone

## 2021-05-12 ENCOUNTER — APPOINTMENT (OUTPATIENT)
Age: 76
Setting detail: DERMATOLOGY
End: 2021-05-14

## 2021-05-12 PROBLEM — C44.722 SQUAMOUS CELL CARCINOMA OF SKIN OF RIGHT LOWER LIMB, INCLUDING HIP: Status: ACTIVE | Noted: 2021-05-12

## 2021-05-12 PROCEDURE — 77401 RADIATION TX DELIVERY SUPFC: CPT

## 2021-05-12 PROCEDURE — OTHER SUPERFICIAL RADIATION TREATMENT: OTHER

## 2021-05-12 PROCEDURE — OTHER TREATMENT REGIMEN: OTHER

## 2021-05-12 PROCEDURE — G6001 ECHO GUIDANCE RADIOTHERAPY: HCPCS

## 2021-05-12 PROCEDURE — 77280 THER RAD SIMULAJ FIELD SMPL: CPT

## 2021-05-12 NOTE — PROCEDURE: TREATMENT REGIMEN
Plan: This patient has been treated today with superficial radiation therapy for non-melanoma skin cancer.  Written informed consent has been previously obtained from this patient for this treatment.  This consent is documented in the patient’s chart.  The patient gave verbal consent to continue treatment today. \\nThe patient was treated with a specific radiation dose and setup as prescribed by the provider listed on this visit note.  A Radiation Therapist performed administration of radiation. The treatment parameters and cumulative dose are indicated above.\\nPrior to administering the radiation, the patient underwent a verification simulation defining relevant normal and abnormal target anatomy and acquiring images and data necessary to develop optimal radiation treatment process for the patient. This process includes verification of the treatment port(s) and proper treatment positioning.  All treatment ports were photographed.  The patient’s customized lead blocking was confirmed.   Considering, superficial radiotherapy setup is a clinical setup, this requires physician and radiation therapist to clarify location interest being treated against initial images, pathology and patient anatomy. Care was taken ensuring fields treated were geometrically accurate and properly positioned using daily verification simulation.  This process is also utilized to determine if any changes are necessary.   These steps are therefore medically necessary ensuring safe and effective administration of radiation. \\nA high frequency ultrasound image was acquired today for two-dimensional evaluation of the tumor volume and response to treatment, in addition to geometric accuracy of field placement. The daily field placement is separate and distinct from the initial simulation and is an important task in providing safe administration of radiation therapy. Physician evaluation of the ultrasound tumor depth will be ongoing throughout the course of treatment and is deemed medically necessary in order to ensure the efficacy of treatment. Today’s image was evaluated for determination of continuation of treatment with the current plan or with necessary changes as appropriate. Additionally, the use of visualization and targeted assessment allows the patient to be able to see their cancer(s) progress, encouraging patient to complete full course of radiotherapy. \\nUS Depth of 2.83mm with 0.53mm change from previous imaging.  Repop: ++, SUSAN equivocal\\nPer Dr. Winter, continued daily US guidance and clinical setup simulation is required for field placement, measurement of tumor depth, progress and acute effect monitoring. \\n
Detail Level: Zone

## 2021-05-12 NOTE — PROCEDURE: SUPERFICIAL RADIATION TREATMENT
Field Size (Applicator): 4.0 cm
Fractions / Week: 3
Render Additional Prescriptions In Note?: No
Initial Radiation Treatment Planning (Will Render If Bill Simulation = Yes): The patient had a complete consultation regarding all applicable modalities for the treatment of their skin cancer and based on a variety of factors including the type of tumor, size, and location, the relevant medical history as well as local tissue factors, the functional status of the individual, the ability to perform necessary postoperative wound instructions and the need for simultaneous treatments as well as overall wound healing status, it was determined that the patient would begin radiation therapy treatment for skin cancer.  A full simulation and treatment device design was performed including the determination and formulation of appropriate simple and complex devices including lead shield of 0.762 mm thickness to form molded customized shielding to specifically correlate with the lesion size including treatment margin.  The custom lead shield is adequate to accommodate the appropriate applicator and provide adequate shielding around the treatment site.  The specific field applicator, shields, and devices both simple and complex as well as the specific patient setup is outlined below.  The patient was given a full consent for superficial radiation to both verbally and in writing and the full determination of patient's eligibility for treatment and selection is outlined on the patient eligibility and treatment selection form.  The specific superficial radiotherapy prescription was determined and was documented on the superficial radiotherapy prescription form.  A treatment calculation was also performed and documented on the treatment calculation form.  Based on the prescription, the patient was scheduled for a series of fractional treatments.
Fractionation Number (Evaluation): 5
Detail Level: Detailed
Time Dose Fractionation (Optional- Include Units If Applicable): 97
Prescription Used: 1
Intro Statement (Will Not Render If Left Blank): The patient is undergoing superficial radiation therapy for skin cancer and presents for weekly evaluation and management.  Per protocol and as documented on the flow sheet, the patient was questioned as to subjective redness, pruritus, pain, drainage, fatigue, or any other symptoms.  Objectively, the radiation area was evaluated with regards to erythema, atrophy, scale, crusting, erosion, ulceration, edema, purpura, tenderness, warmth, drainage, and any other findings.  The plan was extensively reviewed including the dose, and dosing schedule.  The simulation and clinical setup was also reviewed as was the external and any internal shields and based on this review the appropriateness and sufficiency of treatment was determined.
Treatment Device Design After Initial Simulation Justification (Will Render If Bill For Treatment Devices = Yes): The patient is status post radiation simulation and is evaluated as to the use of additional devices for shielding and placement for radiation therapy.
Ssd In Cm (Optional): 15
Information: Selecting Yes will display possible errors in your note based on the variables you have selected. This validation is only offered as a suggestion for you. PLEASE NOTE THAT THE VALIDATION TEXT WILL BE REMOVED WHEN YOU FINALIZE YOUR NOTE. IF YOU WANT TO FAX A PRELIMINARY NOTE YOU WILL NEED TO TOGGLE THIS TO 'NO' IF YOU DO NOT WANT IT IN YOUR FAXED NOTE.
Energy (Optional-Please Include Units): 100kV
Include Rx 4 When Rendering Additional Prescriptions: Yes
Simple Simulation Preamble Text Will Be Included With Simple Simulations (.......... Indications): Simple simulation was performed today for the following reasons:
Assessment: Appropriate reaction
Daily Fractionated Dose (Optional- Include Units): 272.84
Shielding Size (Optional- Include Units): 3x3
Dimensions-X Axis In Cm: 2
Patient Positioning: Supine
Additional Prescription Justification Text: If there is any interruption in treatment exceeding 5 days please see Decay and Dose Adjustment Calculation and complete treatment under Prescription 2, 3 or 4 as appropriate.
Treatment Time In Min (Optional): 0.38
Total Dose (Optional-Please Include Units): 5456.8 cGy
Functional Status: 0 (fully active)
Custom Shielding Preamble Text Will Not Be Included With Simple Simulations (.......... X X Y Cm): A lead shield of 0.762 mm thickness is utilized to form a molded, custom shield with a
Total Number Of Fractions: 20
Depth (Optional-Please Include Units): 2.3mm
Treatment Margins In Cm: 0.5
Custom Shielding Afterword Text Will Not Be Included With Simple Simulations (X X Y Cm............): port to correlate with the lesion size, including treatment margin. The custom lead shield is adequate to accommodate the appropriate applicator and provide adequate shielding around the treatment site. Additional shielding (as noted below) is used to protect sensitive, normal tissues.
Cumulative Dose In Cgy (Optional): 545.68
Energy (Include Units): 100

## 2021-05-13 ENCOUNTER — APPOINTMENT (OUTPATIENT)
Age: 76
Setting detail: DERMATOLOGY
End: 2021-05-14

## 2021-05-13 PROBLEM — C44.722 SQUAMOUS CELL CARCINOMA OF SKIN OF RIGHT LOWER LIMB, INCLUDING HIP: Status: ACTIVE | Noted: 2021-05-13

## 2021-05-13 PROCEDURE — OTHER SUPERFICIAL RADIATION TREATMENT: OTHER

## 2021-05-13 PROCEDURE — OTHER TREATMENT REGIMEN: OTHER

## 2021-05-13 PROCEDURE — G6001 ECHO GUIDANCE RADIOTHERAPY: HCPCS

## 2021-05-13 PROCEDURE — 77401 RADIATION TX DELIVERY SUPFC: CPT

## 2021-05-13 PROCEDURE — 77280 THER RAD SIMULAJ FIELD SMPL: CPT

## 2021-05-13 NOTE — PROCEDURE: TREATMENT REGIMEN
Plan: This patient has been treated today with superficial radiation therapy for non-melanoma skin cancer.  Written informed consent has been previously obtained from this patient for this treatment.  This consent is documented in the patient’s chart.  The patient gave verbal consent to continue treatment today. \\nThe patient was treated with a specific radiation dose and setup as prescribed by the provider listed on this visit note.  A Radiation Therapist performed administration of radiation. The treatment parameters and cumulative dose are indicated above.\\nPrior to administering the radiation, the patient underwent a verification simulation defining relevant normal and abnormal target anatomy and acquiring images and data necessary to develop optimal radiation treatment process for the patient. This process includes verification of the treatment port(s) and proper treatment positioning.  All treatment ports were photographed.  The patient’s customized lead blocking was confirmed.   Considering, superficial radiotherapy setup is a clinical setup, this requires physician and radiation therapist to clarify location interest being treated against initial images, pathology and patient anatomy. Care was taken ensuring fields treated were geometrically accurate and properly positioned using daily verification simulation.  This process is also utilized to determine if any changes are necessary.   These steps are therefore medically necessary ensuring safe and effective administration of radiation. \\nA high frequency ultrasound image was acquired today for two-dimensional evaluation of the tumor volume and response to treatment, in addition to geometric accuracy of field placement. The daily field placement is separate and distinct from the initial simulation and is an important task in providing safe administration of radiation therapy. Physician evaluation of the ultrasound tumor depth will be ongoing throughout the course of treatment and is deemed medically necessary in order to ensure the efficacy of treatment. Today’s image was evaluated for determination of continuation of treatment with the current plan or with necessary changes as appropriate. Additionally, the use of visualization and targeted assessment allows the patient to be able to see their cancer(s) progress, encouraging patient to complete full course of radiotherapy. \\nUS Depth of 2.69mm with 0.14mm change from previous imaging.  Repop: +\\nPer Dr. Winter, continued daily US guidance and clinical setup simulation is required for field placement, measurement of tumor depth, progress and acute effect monitoring. \\n
Detail Level: Zone

## 2021-05-13 NOTE — PROCEDURE: SUPERFICIAL RADIATION TREATMENT
Simple Simulation Preamble Text Will Be Included With Simple Simulations (.......... Indications): Simple simulation was performed today for the following reasons:
Field Number: 1
Time Dose Fractionation (Optional- Include Units If Applicable): 97
Bill For Radiation Treatment: Yes
Assessment: Appropriate reaction
Additional Prescription Justification Text: If there is any interruption in treatment exceeding 5 days please see Decay and Dose Adjustment Calculation and complete treatment under Prescription 2, 3 or 4 as appropriate.
Treatment Margins In Cm: 0.5
Total Number Of Fractions: 20
Bill For Simulation And Treatment Device Design: No
Total Number Of Fractions Rx 2: 15
Treatment Time In Min (Optional): 0.38
Shielding Size (Optional- Include Units): 3x3
Field Size (Applicator): 4.0 cm
Patient Positioning: Supine
Daily Fractionated Dose (Optional- Include Units): 272.84
Energy (Include Units): 100
Custom Shielding Preamble Text Will Not Be Included With Simple Simulations (.......... X X Y Cm): A lead shield of 0.762 mm thickness is utilized to form a molded, custom shield with a
Energy (Optional-Please Include Units): 100kV
Intro Statement (Will Not Render If Left Blank): The patient is undergoing superficial radiation therapy for skin cancer and presents for weekly evaluation and management.  Per protocol and as documented on the flow sheet, the patient was questioned as to subjective redness, pruritus, pain, drainage, fatigue, or any other symptoms.  Objectively, the radiation area was evaluated with regards to erythema, atrophy, scale, crusting, erosion, ulceration, edema, purpura, tenderness, warmth, drainage, and any other findings.  The plan was extensively reviewed including the dose, and dosing schedule.  The simulation and clinical setup was also reviewed as was the external and any internal shields and based on this review the appropriateness and sufficiency of treatment was determined.
Total Dose (Optional-Please Include Units): 5456.8 cGy
Port Dimensions-X Axis In Cm: 3
Fractions / Week Rx 4: 5
Functional Status: 0 (fully active)
Detail Level: Detailed
Information: Selecting Yes will display possible errors in your note based on the variables you have selected. This validation is only offered as a suggestion for you. PLEASE NOTE THAT THE VALIDATION TEXT WILL BE REMOVED WHEN YOU FINALIZE YOUR NOTE. IF YOU WANT TO FAX A PRELIMINARY NOTE YOU WILL NEED TO TOGGLE THIS TO 'NO' IF YOU DO NOT WANT IT IN YOUR FAXED NOTE.
Custom Shielding Afterword Text Will Not Be Included With Simple Simulations (X X Y Cm............): port to correlate with the lesion size, including treatment margin. The custom lead shield is adequate to accommodate the appropriate applicator and provide adequate shielding around the treatment site. Additional shielding (as noted below) is used to protect sensitive, normal tissues.
Depth (Optional-Please Include Units): 2.3mm
Cumulative Dose In Cgy (Optional): 818.52
Dimensions-X Axis In Cm: 2
Simple Simulation Afterword Text Will Be Included With Simple Simulations (Indications............): The patient had a complete consultation regarding all applicable modalities for the treatment of their skin cancer and based on a variety of factors including the type of tumor, size, and location, the relevant medical history as well as local tissue factors, the functional status of the individual, the ability to perform necessary postoperative wound instructions and the need for simultaneous treatments as well as overall wound healing status, it was determined that the patient would begin radiation therapy treatment for skin cancer.  A full simulation and treatment device design was performed including the determination and formulation of appropriate simple and complex devices including lead shield of 0.762 mm thickness to form molded customized shielding to specifically correlate with the lesion size including treatment margin.  The custom lead shield is adequate to accommodate the appropriate applicator and provide adequate shielding around the treatment site.  The specific field applicator, shields, and devices both simple and complex as well as the specific patient setup is outlined below.  The patient was given a full consent for superficial radiation to both verbally and in writing and the full determination of patient's eligibility for treatment and selection is outlined on the patient eligibility and treatment selection form.  The specific superficial radiotherapy prescription was determined and was documented on the superficial radiotherapy prescription form.  A treatment calculation was also performed and documented on the treatment calculation form.  Based on the prescription, the patient was scheduled for a series of fractional treatments.
Treatment Device Design After Initial Simulation Justification (Will Render If Bill For Treatment Devices = Yes): The patient is status post radiation simulation and is evaluated as to the use of additional devices for shielding and placement for radiation therapy.

## 2021-05-17 ENCOUNTER — APPOINTMENT (OUTPATIENT)
Age: 76
Setting detail: DERMATOLOGY
End: 2021-05-22

## 2021-05-17 PROBLEM — C44.722 SQUAMOUS CELL CARCINOMA OF SKIN OF RIGHT LOWER LIMB, INCLUDING HIP: Status: ACTIVE | Noted: 2021-05-17

## 2021-05-17 PROCEDURE — 77401 RADIATION TX DELIVERY SUPFC: CPT

## 2021-05-17 PROCEDURE — OTHER SUPERFICIAL RADIATION TREATMENT: OTHER

## 2021-05-17 PROCEDURE — 77280 THER RAD SIMULAJ FIELD SMPL: CPT

## 2021-05-17 PROCEDURE — OTHER TREATMENT REGIMEN: OTHER

## 2021-05-17 PROCEDURE — G6001 ECHO GUIDANCE RADIOTHERAPY: HCPCS

## 2021-05-17 NOTE — PROCEDURE: SUPERFICIAL RADIATION TREATMENT
Detail Level: Detailed
Port Dimensions-X Axis In Cm: 3
Treatment Time / Fractionation (Optional- Include Units): 0.38
Diego For Simulation Without Treatment Device Design (Simple Simulation): No
Fractions / Week Rx 4: 5
Dose Per Fractionation In Cgy (Optional): 272.84
Total Number Of Fractions Rx 3: 15
Simple Simulation Afterword Text Will Be Included With Simple Simulations (Indications............): The patient had a complete consultation regarding all applicable modalities for the treatment of their skin cancer and based on a variety of factors including the type of tumor, size, and location, the relevant medical history as well as local tissue factors, the functional status of the individual, the ability to perform necessary postoperative wound instructions and the need for simultaneous treatments as well as overall wound healing status, it was determined that the patient would begin radiation therapy treatment for skin cancer.  A full simulation and treatment device design was performed including the determination and formulation of appropriate simple and complex devices including lead shield of 0.762 mm thickness to form molded customized shielding to specifically correlate with the lesion size including treatment margin.  The custom lead shield is adequate to accommodate the appropriate applicator and provide adequate shielding around the treatment site.  The specific field applicator, shields, and devices both simple and complex as well as the specific patient setup is outlined below.  The patient was given a full consent for superficial radiation to both verbally and in writing and the full determination of patient's eligibility for treatment and selection is outlined on the patient eligibility and treatment selection form.  The specific superficial radiotherapy prescription was determined and was documented on the superficial radiotherapy prescription form.  A treatment calculation was also performed and documented on the treatment calculation form.  Based on the prescription, the patient was scheduled for a series of fractional treatments.
Field Size (Applicator): 4.0 cm
Dimensions-Y Axis In Cm: 2
Prescription Used: 1
Assessment: Appropriate reaction
Custom Shielding Preamble Text Will Not Be Included With Simple Simulations (.......... X X Y Cm): A lead shield of 0.762 mm thickness is utilized to form a molded, custom shield with a
Intro Statement (Will Not Render If Left Blank): The patient is undergoing superficial radiation therapy for skin cancer and presents for weekly evaluation and management.  Per protocol and as documented on the flow sheet, the patient was questioned as to subjective redness, pruritus, pain, drainage, fatigue, or any other symptoms.  Objectively, the radiation area was evaluated with regards to erythema, atrophy, scale, crusting, erosion, ulceration, edema, purpura, tenderness, warmth, drainage, and any other findings.  The plan was extensively reviewed including the dose, and dosing schedule.  The simulation and clinical setup was also reviewed as was the external and any internal shields and based on this review the appropriateness and sufficiency of treatment was determined.
Total Number Of Fractions: 20
Time Dose Fractionation (Optional- Include Units If Applicable): 97
Include Rx 4 When Rendering Additional Prescriptions: Yes
Energy (Optional-Please Include Units): 100kV
Fractionation Number: 4
Energy (Include Units): 100
Patient Positioning: Supine
Additional Prescription Justification Text: If there is any interruption in treatment exceeding 5 days please see Decay and Dose Adjustment Calculation and complete treatment under Prescription 2, 3 or 4 as appropriate.
Information: Selecting Yes will display possible errors in your note based on the variables you have selected. This validation is only offered as a suggestion for you. PLEASE NOTE THAT THE VALIDATION TEXT WILL BE REMOVED WHEN YOU FINALIZE YOUR NOTE. IF YOU WANT TO FAX A PRELIMINARY NOTE YOU WILL NEED TO TOGGLE THIS TO 'NO' IF YOU DO NOT WANT IT IN YOUR FAXED NOTE.
Treatment Device Design After Initial Simulation Justification (Will Render If Bill For Treatment Devices = Yes): The patient is status post radiation simulation and is evaluated as to the use of additional devices for shielding and placement for radiation therapy.
Treatment Margins In Cm: 0.5
Functional Status: 0 (fully active)
Custom Shielding Afterword Text Will Not Be Included With Simple Simulations (X X Y Cm............): port to correlate with the lesion size, including treatment margin. The custom lead shield is adequate to accommodate the appropriate applicator and provide adequate shielding around the treatment site. Additional shielding (as noted below) is used to protect sensitive, normal tissues.
Simple Simulation Preamble Text Will Be Included With Simple Simulations (.......... Indications): Simple simulation was performed today for the following reasons:
Shielding Size (Optional- Include Units): 3x3
Cumulative Dose In Cgy (Optional): 1091.36
Depth (Optional-Please Include Units): 2.3mm
Total Dose (Optional-Please Include Units): 5456.8 cGy

## 2021-05-17 NOTE — PROCEDURE: TREATMENT REGIMEN
Detail Level: Zone
Plan: This patient has been treated today with superficial radiation therapy for non-melanoma skin cancer.  Written informed consent has been previously obtained from this patient for this treatment.  This consent is documented in the patient’s chart.  The patient gave verbal consent to continue treatment today. \\nThe patient was treated with a specific radiation dose and setup as prescribed by the provider listed on this visit note.  A Radiation Therapist performed administration of radiation. The treatment parameters and cumulative dose are indicated above.\\nPrior to administering the radiation, the patient underwent a verification simulation defining relevant normal and abnormal target anatomy and acquiring images and data necessary to develop optimal radiation treatment process for the patient. This process includes verification of the treatment port(s) and proper treatment positioning.  All treatment ports were photographed.  The patient’s customized lead blocking was confirmed.   Considering, superficial radiotherapy setup is a clinical setup, this requires physician and radiation therapist to clarify location interest being treated against initial images, pathology and patient anatomy. Care was taken ensuring fields treated were geometrically accurate and properly positioned using daily verification simulation.  This process is also utilized to determine if any changes are necessary.   These steps are therefore medically necessary ensuring safe and effective administration of radiation. \\nA high frequency ultrasound image was acquired today for two-dimensional evaluation of the tumor volume and response to treatment, in addition to geometric accuracy of field placement. The daily field placement is separate and distinct from the initial simulation and is an important task in providing safe administration of radiation therapy. Physician evaluation of the ultrasound tumor depth will be ongoing throughout the course of treatment and is deemed medically necessary in order to ensure the efficacy of treatment. Today’s image was evaluated for determination of continuation of treatment with the current plan or with necessary changes as appropriate. Additionally, the use of visualization and targeted assessment allows the patient to be able to see their cancer(s) progress, encouraging patient to complete full course of radiotherapy. \\nUS Depth of 1.84mm with 0.85mm change from previous imaging.  Repop: +\\nPer Dr. Winter, continued daily US guidance and clinical setup simulation is required for field placement, measurement of tumor depth, progress and acute effect monitoring. \\n

## 2021-05-19 ENCOUNTER — APPOINTMENT (OUTPATIENT)
Age: 76
Setting detail: DERMATOLOGY
End: 2021-05-22

## 2021-05-19 ENCOUNTER — APPOINTMENT (OUTPATIENT)
Age: 76
Setting detail: DERMATOLOGY
End: 2021-05-19

## 2021-05-19 PROBLEM — C44.722 SQUAMOUS CELL CARCINOMA OF SKIN OF RIGHT LOWER LIMB, INCLUDING HIP: Status: ACTIVE | Noted: 2021-05-19

## 2021-05-19 PROCEDURE — OTHER TREATMENT REGIMEN: OTHER

## 2021-05-19 PROCEDURE — 77280 THER RAD SIMULAJ FIELD SMPL: CPT

## 2021-05-19 PROCEDURE — 77427 RADIATION TX MANAGEMENT X5: CPT

## 2021-05-19 PROCEDURE — OTHER SUPERFICIAL RADIATION TREATMENT: OTHER

## 2021-05-19 PROCEDURE — 11302 SHAVE SKIN LESION 1.1-2.0 CM: CPT

## 2021-05-19 PROCEDURE — OTHER ADDITIONAL NOTES: OTHER

## 2021-05-19 PROCEDURE — OTHER SHAVE REMOVAL: OTHER

## 2021-05-19 PROCEDURE — G6001 ECHO GUIDANCE RADIOTHERAPY: HCPCS

## 2021-05-19 PROCEDURE — 77401 RADIATION TX DELIVERY SUPFC: CPT

## 2021-05-19 NOTE — PROCEDURE: TREATMENT REGIMEN
Detail Level: Zone
Plan: This patient has been treated today with superficial radiation therapy for non-melanoma skin cancer.  Written informed consent has been previously obtained from this patient for this treatment.  This consent is documented in the patient’s chart.  The patient gave verbal consent to continue treatment today. \\nThe patient was treated with a specific radiation dose and setup as prescribed by the provider listed on this visit note.  A Radiation Therapist performed administration of radiation. The treatment parameters and cumulative dose are indicated above.\\nPrior to administering the radiation, the patient underwent a verification simulation defining relevant normal and abnormal target anatomy and acquiring images and data necessary to develop optimal radiation treatment process for the patient. This process includes verification of the treatment port(s) and proper treatment positioning.  All treatment ports were photographed.  The patient’s customized lead blocking was confirmed.   Considering, superficial radiotherapy setup is a clinical setup, this requires physician and radiation therapist to clarify location interest being treated against initial images, pathology and patient anatomy. Care was taken ensuring fields treated were geometrically accurate and properly positioned using daily verification simulation.  This process is also utilized to determine if any changes are necessary.   These steps are therefore medically necessary ensuring safe and effective administration of radiation. \\nA high frequency ultrasound image was acquired today for two-dimensional evaluation of the tumor volume and response to treatment, in addition to geometric accuracy of field placement. The daily field placement is separate and distinct from the initial simulation and is an important task in providing safe administration of radiation therapy. Physician evaluation of the ultrasound tumor depth will be ongoing throughout the course of treatment and is deemed medically necessary in order to ensure the efficacy of treatment. Today’s image was evaluated for determination of continuation of treatment with the current plan or with necessary changes as appropriate. Additionally, the use of visualization and targeted assessment allows the patient to be able to see their cancer(s) progress, encouraging patient to complete full course of radiotherapy. \\nUS Depth of 3.83mm with 1.99mm change from previous imaging.  Repop: +\\nPer Dr. Winter, continued daily US guidance and clinical setup simulation is required for field placement, measurement of tumor depth, progress and acute effect monitoring. \\n

## 2021-05-19 NOTE — PROCEDURE: ADDITIONAL NOTES
Detail Level: Zone
Additional Notes: This was a shave-down for SRT as lesion had thickened.
Render Risk Assessment In Note?: yes

## 2021-05-19 NOTE — PROCEDURE: SUPERFICIAL RADIATION TREATMENT
Fractions / Week Rx 3: 5
Prescription Used: 1
Intro Statement (Will Not Render If Left Blank): The patient is undergoing superficial radiation therapy for skin cancer and presents for weekly evaluation and management.  Per protocol and as documented on the flow sheet, the patient was questioned as to subjective redness, pruritus, pain, drainage, fatigue, or any other symptoms.  Objectively, the radiation area was evaluated with regards to erythema, atrophy, scale, crusting, erosion, ulceration, edema, purpura, tenderness, warmth, drainage, and any other findings.  The plan was extensively reviewed including the dose, and dosing schedule.  The simulation and clinical setup was also reviewed as was the external and any internal shields and based on this review the appropriateness and sufficiency of treatment was determined.
Custom Shielding Preamble Text Will Not Be Included With Simple Simulations (.......... X X Y Cm): A lead shield of 0.762 mm thickness is utilized to form a molded, custom shield with a
Ssd In Cm (Optional): 15
Treatment Time / Fractionation (Optional- Include Units): 0.38
Simple Simulation Preamble Text Will Be Included With Simple Simulations (.......... Indications): Simple simulation was performed today for the following reasons:
Bill And Render Text From Evaluation And Management Tab (Will Bill [id number]): Yes
Functional Status: 0 (fully active)
Port Dimensions-Y Axis In Cm: 3
Energy (Optional-Please Include Units): 100kV
Dimensions-Y Axis In Cm: 2
Information: Selecting Yes will display possible errors in your note based on the variables you have selected. This validation is only offered as a suggestion for you. PLEASE NOTE THAT THE VALIDATION TEXT WILL BE REMOVED WHEN YOU FINALIZE YOUR NOTE. IF YOU WANT TO FAX A PRELIMINARY NOTE YOU WILL NEED TO TOGGLE THIS TO 'NO' IF YOU DO NOT WANT IT IN YOUR FAXED NOTE.
Diego For Simulation Without Treatment Device Design (Simple Simulation): No
Depth (Optional-Please Include Units): 2.3mm
Cumulative Dose In Cgy (Optional): 1364.2
Additional Prescription Justification Text: If there is any interruption in treatment exceeding 5 days please see Decay and Dose Adjustment Calculation and complete treatment under Prescription 2, 3 or 4 as appropriate.
Custom Shielding Afterword Text Will Not Be Included With Simple Simulations (X X Y Cm............): port to correlate with the lesion size, including treatment margin. The custom lead shield is adequate to accommodate the appropriate applicator and provide adequate shielding around the treatment site. Additional shielding (as noted below) is used to protect sensitive, normal tissues.
Shielding Size (Optional- Include Units): 3x3
Simple Simulation Afterword Text Will Be Included With Simple Simulations (Indications............): The patient had a complete consultation regarding all applicable modalities for the treatment of their skin cancer and based on a variety of factors including the type of tumor, size, and location, the relevant medical history as well as local tissue factors, the functional status of the individual, the ability to perform necessary postoperative wound instructions and the need for simultaneous treatments as well as overall wound healing status, it was determined that the patient would begin radiation therapy treatment for skin cancer.  A full simulation and treatment device design was performed including the determination and formulation of appropriate simple and complex devices including lead shield of 0.762 mm thickness to form molded customized shielding to specifically correlate with the lesion size including treatment margin.  The custom lead shield is adequate to accommodate the appropriate applicator and provide adequate shielding around the treatment site.  The specific field applicator, shields, and devices both simple and complex as well as the specific patient setup is outlined below.  The patient was given a full consent for superficial radiation to both verbally and in writing and the full determination of patient's eligibility for treatment and selection is outlined on the patient eligibility and treatment selection form.  The specific superficial radiotherapy prescription was determined and was documented on the superficial radiotherapy prescription form.  A treatment calculation was also performed and documented on the treatment calculation form.  Based on the prescription, the patient was scheduled for a series of fractional treatments.
Total Number Of Fractions: 20
Field Size (Applicator): 4.0 cm
Dose Per Fractionation In Cgy (Optional): 272.84
Treatment Margins In Cm: 0.5
Total Dose (Optional-Please Include Units): 5456.8 cGy
Treatment Device Design After Initial Simulation Justification (Will Render If Bill For Treatment Devices = Yes): The patient is status post radiation simulation and is evaluated as to the use of additional devices for shielding and placement for radiation therapy.
Assessment: Appropriate reaction
Detail Level: Detailed
Time Dose Fractionation (Optional- Include Units If Applicable): 97
Comments: Ultrasound reveals a sugnificant increase in the depth of lesion.  Prior to SRT treatment, the lesion was debulked by a shave removal.
Patient Positioning: Supine
Energy (Include Units): 100

## 2021-05-19 NOTE — PROCEDURE: SHAVE REMOVAL
Wound Care: Petrolatum
Bill [id number] For Specimen Handling/Conveyance To Laboratory?: no
Path Notes (To The Dermatopathologist): Please check margins.
Hemostasis: Drysol
Was A Bandage Applied: Yes
Biopsy Method: Dermablade
Anesthesia Type: 1% lidocaine with epinephrine
Consent was obtained from the patient. The risks and benefits to therapy were discussed in detail. Specifically, the risks of infection, scarring, bleeding, prolonged wound healing, incomplete removal, allergy to anesthesia, nerve injury and recurrence were addressed. Prior to the procedure, the treatment site was clearly identified and confirmed by the patient. All components of Universal Protocol/PAUSE Rule completed.
Post-Care Instructions: I reviewed with the patient in detail post-care instructions. Patient is to keep the biopsy site dry overnight, and then apply bacitracin twice daily until healed. Patient may apply hydrogen peroxide soaks to remove any crusting.
Size Of Lesion In Cm (Required): 1.5
Medical Necessity Clause: This procedure was medically necessary because the lesion that was treated was:
Notification Instructions: Patient will be notified of pathology results. However, patient instructed to call the office if not contacted within 2 weeks.
Medical Necessity Information: It is in your best interest to select a reason for this procedure from the list below. All of these items fulfill various CMS LCD requirements except the new and changing color options.
X Size Of Lesion In Cm (Optional): 0
Detail Level: Detailed
Billing Type: Third-Party Bill

## 2021-05-20 ENCOUNTER — APPOINTMENT (OUTPATIENT)
Age: 76
Setting detail: DERMATOLOGY
End: 2021-05-22

## 2021-05-20 PROBLEM — C44.722 SQUAMOUS CELL CARCINOMA OF SKIN OF RIGHT LOWER LIMB, INCLUDING HIP: Status: ACTIVE | Noted: 2021-05-20

## 2021-05-20 PROCEDURE — OTHER SUPERFICIAL RADIATION TREATMENT: OTHER

## 2021-05-20 PROCEDURE — OTHER TREATMENT REGIMEN: OTHER

## 2021-05-20 PROCEDURE — 77401 RADIATION TX DELIVERY SUPFC: CPT

## 2021-05-20 PROCEDURE — 77280 THER RAD SIMULAJ FIELD SMPL: CPT

## 2021-05-20 NOTE — PROCEDURE: TREATMENT REGIMEN
Plan: This patient has been treated today superficial radiation therapy for non-melanoma skin cancer. Written informed consent has been previously obtained from this patient for this treatment. This consent is documented in the patient’s chart. The patient gave verbal consent to continue treatment today.\\nThe patient was treated with a specific radiation dose and setup as prescribed by the provider listed on this visit note. A Radiation Therapist performed administration of radiation under supervision of provider. The treatment parameters and cumulative dose are indicated above.\\nPrior to administering the radiation, the patient underwent a verification therapeutic radiology simulation-aided field setting defining relevant normal and abnormal target anatomy and acquiring data necessary developing optimal radiation treatment process for the patient. This process includes verification of the treatment port(s) and proper treatment positioning. All treatment ports were photographed within electronic medical record. The patient’s customized lead blocking along with gross tumor volume and margin was confirmed. Considering superficial radiotherapy is clinical in setup, this requires physician and radiation therapist to clarify location interest being treated against initial images, pathology and patient anatomy. Care was taken ensuring fields treated were geometrically accurate and properly positioned using therapeutic radiology simulation-aided field setting verification per fraction. This process is also utilized to determine if any prescription or setup changes are necessary. These steps are therefore medically necessary ensuring safe and effective administration of radiation. Ongoing therapeutic radiology simulation-aided field setting verification is ordered throughout course of therapy.\\nThe field placement, per fraction, is separate and distinct from the initial simulation, and is an important task in providing safe administration of superficial radiation therapy. Physician evaluation will be ongoing throughout the course of treatment and is deemed medically necessary in order to ensure the efficacy of treatment and any necessary changes. \\nAccording to provider review of verification therapeutic radiology simulation-aided field setting and imaging, NO change is required.\\nNo US was taken to minimize risk of infection to open skin.\\nPer Dr. Winter, continued therapeutic radiology simulation-aided field setting verification per fraction is required for field placement, progress and acute effect monitoring.\\n
Detail Level: Zone

## 2021-05-20 NOTE — PROCEDURE: SUPERFICIAL RADIATION TREATMENT
Dose / Tx In Cgy (Optional): 272.84
Energy (Optional-Please Include Units): 100kV
Treatment Margins In Cm: 0.5
Field Size (Applicator): 4.0 cm
Render Patient Eligibility And Selection In Note?: No
Simple Simulation Preamble Text Will Be Included With Simple Simulations (.......... Indications): Simple simulation was performed today for the following reasons:
Computed Treatment Time In Min (Will Render The Same As Calculated Treatment Time If Left Blank): 0.38
Port Dimensions-X Axis In Cm: 3
Depth (Optional-Please Include Units): 2.3mm
Fractions / Week Rx 2: 5
Fractionation Number: 6
Functional Status: 0 (fully active)
Detail Level: Detailed
Custom Shielding Preamble Text Will Not Be Included With Simple Simulations (.......... X X Y Cm): A lead shield of 0.762 mm thickness is utilized to form a molded, custom shield with a
Energy (Include Units): 100
Bill For Radiation Treatment: Yes
Field Number: 1
Total Dose (Optional-Please Include Units): 5456.8 cGy
Assessment: Appropriate reaction
Time Dose Fractionation (Optional- Include Units If Applicable): 97
Treatment Device Design After Initial Simulation Justification (Will Render If Bill For Treatment Devices = Yes): The patient is status post radiation simulation and is evaluated as to the use of additional devices for shielding and placement for radiation therapy.
Total Number Of Fractions: 20
Patient Positioning: Supine
Total Number Of Fractions Rx 3: 15
Simple Simulation Afterword Text Will Be Included With Simple Simulations (Indications............): The patient had a complete consultation regarding all applicable modalities for the treatment of their skin cancer and based on a variety of factors including the type of tumor, size, and location, the relevant medical history as well as local tissue factors, the functional status of the individual, the ability to perform necessary postoperative wound instructions and the need for simultaneous treatments as well as overall wound healing status, it was determined that the patient would begin radiation therapy treatment for skin cancer.  A full simulation and treatment device design was performed including the determination and formulation of appropriate simple and complex devices including lead shield of 0.762 mm thickness to form molded customized shielding to specifically correlate with the lesion size including treatment margin.  The custom lead shield is adequate to accommodate the appropriate applicator and provide adequate shielding around the treatment site.  The specific field applicator, shields, and devices both simple and complex as well as the specific patient setup is outlined below.  The patient was given a full consent for superficial radiation to both verbally and in writing and the full determination of patient's eligibility for treatment and selection is outlined on the patient eligibility and treatment selection form.  The specific superficial radiotherapy prescription was determined and was documented on the superficial radiotherapy prescription form.  A treatment calculation was also performed and documented on the treatment calculation form.  Based on the prescription, the patient was scheduled for a series of fractional treatments.
Dimensions-X Axis In Cm: 2
Cumulative Dose In Cgy (Optional): 1637.04
Comments: Ultrasound reveals a sugnificant increase in the depth of lesion.  Prior to SRT treatment, the lesion was debulked by a shave removal.
Custom Shielding Afterword Text Will Not Be Included With Simple Simulations (X X Y Cm............): port to correlate with the lesion size, including treatment margin. The custom lead shield is adequate to accommodate the appropriate applicator and provide adequate shielding around the treatment site. Additional shielding (as noted below) is used to protect sensitive, normal tissues.
Shielding Size (Optional- Include Units): 3x3
Intro Statement (Will Not Render If Left Blank): The patient is undergoing superficial radiation therapy for skin cancer and presents for weekly evaluation and management.  Per protocol and as documented on the flow sheet, the patient was questioned as to subjective redness, pruritus, pain, drainage, fatigue, or any other symptoms.  Objectively, the radiation area was evaluated with regards to erythema, atrophy, scale, crusting, erosion, ulceration, edema, purpura, tenderness, warmth, drainage, and any other findings.  The plan was extensively reviewed including the dose, and dosing schedule.  The simulation and clinical setup was also reviewed as was the external and any internal shields and based on this review the appropriateness and sufficiency of treatment was determined.
Additional Prescription Justification Text: If there is any interruption in treatment exceeding 5 days please see Decay and Dose Adjustment Calculation and complete treatment under Prescription 2, 3 or 4 as appropriate.
Information: Selecting Yes will display possible errors in your note based on the variables you have selected. This validation is only offered as a suggestion for you. PLEASE NOTE THAT THE VALIDATION TEXT WILL BE REMOVED WHEN YOU FINALIZE YOUR NOTE. IF YOU WANT TO FAX A PRELIMINARY NOTE YOU WILL NEED TO TOGGLE THIS TO 'NO' IF YOU DO NOT WANT IT IN YOUR FAXED NOTE.

## 2021-05-22 ENCOUNTER — APPOINTMENT (OUTPATIENT)
Age: 76
Setting detail: DERMATOLOGY
End: 2021-07-07

## 2021-05-22 PROBLEM — C44.722 SQUAMOUS CELL CARCINOMA OF SKIN OF RIGHT LOWER LIMB, INCLUDING HIP: Status: ACTIVE | Noted: 2021-05-22

## 2021-05-22 PROCEDURE — OTHER FOLLOW UP FOR NEXT VISIT: OTHER

## 2021-05-22 PROCEDURE — OTHER SUPERFICIAL RADIATION TREATMENT: OTHER

## 2021-05-22 PROCEDURE — OTHER TREATMENT REGIMEN: OTHER

## 2021-05-22 PROCEDURE — 77336 RADIATION PHYSICS CONSULT: CPT

## 2021-05-22 NOTE — PROCEDURE: TREATMENT REGIMEN
Plan: This patient has been treated today with image guided superficial radiation therapy for non-melanoma skin cancer. Written informed consent has been previously obtained from this patient for this treatment. This consent is documented in the patient’s chart. The patient gave verbal consent to continue treatment today. The patient was treated with a specific radiation dose and setup as prescribed by the provider listed on this visit note. A Radiation Therapist performed administration of radiation under supervision of provider. The treatment parameters and cumulative dose are indicated above. Prior to administering the radiation, the patient underwent a verification therapeutic radiology simulation-aided field setting defining relevant normal and abnormal target anatomy and acquiring images with high frequency ultrasound in addition to data necessary developing optimal radiation treatment process for the patient. This process includes verification of the treatment port(s) and proper treatment positioning. All treatment ports were photographed within electronic medical record. The patient’s customized lead blocking along with gross tumor volume and margin was confirmed. Considering superficial radiotherapy is clinical in setup, this requires physician and radiation therapist to clarify location interest being treated against initial images, pathology and patient anatomy. Care was taken ensuring fields treated were geometrically accurate and properly positioned using therapeutic radiology simulation-aided field setting verification per fraction. This process is also utilized to determine if any prescription or setup changes are necessary. These steps are therefore medically necessary ensuring safe\\nand effective administration of radiation. Ongoing therapeutic radiology simulation-aided field setting verification is ordered throughout course of therapy.\\nA high frequency ultrasound image was acquired today for two-dimensional evaluation of the tumor volume and response to treatment, in addition to geometric accuracy of field placement. US depth Is 2.34mm, which is 0.17mm in difference from previous imaging. The field placement and ultrasound imaging, per fraction, is separate and distinct from the initial simulation, and is an important task in providing safe administration of superficial radiation therapy. Physician evaluation of the ultrasound tumor depth will be ongoing throughout the course of treatment, and is deemed medically necessary in order to ensure the efficacy of treatment and any necessary changes. Today’s image was evaluated for determination of continuation of treatment with the current plan or with necessary changes as appropriate. According to provider review of verification therapeutic radiology simulation-aided field setting and imaging, No change is required. Additionally, the use of ultrasound visualization and targeted assessment allows the patient to be able to see their cancer(s) progress, encouraging patient to complete and maintain compliance through full course of radiotherapy. Per Dr. Winter, continued ultrasound guidance and therapeutic radiology simulation-aided field setting verification per fraction is required for field placement, measurement of tumor depth, progress and acute effect monitoring.\\n
Detail Level: Detailed

## 2021-05-22 NOTE — PROCEDURE: SUPERFICIAL RADIATION TREATMENT
Dose / Tx In Cgy (Optional): 272.84
Field Number: 1
Energy (Optional-Please Include Units): 100kV
Fractions / Week Rx 3: 5
Information: Selecting Yes will display possible errors in your note based on the variables you have selected. This validation is only offered as a suggestion for you. PLEASE NOTE THAT THE VALIDATION TEXT WILL BE REMOVED WHEN YOU FINALIZE YOUR NOTE. IF YOU WANT TO FAX A PRELIMINARY NOTE YOU WILL NEED TO TOGGLE THIS TO 'NO' IF YOU DO NOT WANT IT IN YOUR FAXED NOTE.
Port Dimensions-X Axis In Cm: 3
Treatment Margins In Cm: 0.5
Render Patient Eligibility And Selection In Note?: No
Simple Simulation Afterword Text Will Be Included With Simple Simulations (Indications............): The patient had a complete consultation regarding all applicable modalities for the treatment of their skin cancer and based on a variety of factors including the type of tumor, size, and location, the relevant medical history as well as local tissue factors, the functional status of the individual, the ability to perform necessary postoperative wound instructions and the need for simultaneous treatments as well as overall wound healing status, it was determined that the patient would begin radiation therapy treatment for skin cancer.  A full simulation and treatment device design was performed including the determination and formulation of appropriate simple and complex devices including lead shield of 0.762 mm thickness to form molded customized shielding to specifically correlate with the lesion size including treatment margin.  The custom lead shield is adequate to accommodate the appropriate applicator and provide adequate shielding around the treatment site.  The specific field applicator, shields, and devices both simple and complex as well as the specific patient setup is outlined below.  The patient was given a full consent for superficial radiation to both verbally and in writing and the full determination of patient's eligibility for treatment and selection is outlined on the patient eligibility and treatment selection form.  The specific superficial radiotherapy prescription was determined and was documented on the superficial radiotherapy prescription form.  A treatment calculation was also performed and documented on the treatment calculation form.  Based on the prescription, the patient was scheduled for a series of fractional treatments.
Bill For Radiation Treatment: Yes
Dimensions-X Axis In Cm: 2
Intro Statement (Will Not Render If Left Blank): The patient is undergoing superficial radiation therapy for skin cancer and presents for weekly evaluation and management.  Per protocol and as documented on the flow sheet, the patient was questioned as to subjective redness, pruritus, pain, drainage, fatigue, or any other symptoms.  Objectively, the radiation area was evaluated with regards to erythema, atrophy, scale, crusting, erosion, ulceration, edema, purpura, tenderness, warmth, drainage, and any other findings.  The plan was extensively reviewed including the dose, and dosing schedule.  The simulation and clinical setup was also reviewed as was the external and any internal shields and based on this review the appropriateness and sufficiency of treatment was determined.
Total Number Of Fractions Rx 4: 15
Energy (Include Units): 100
Total Number Of Fractions: 20
Treatment Device Design After Initial Simulation Justification (Will Render If Bill For Treatment Devices = Yes): The patient is status post radiation simulation and is evaluated as to the use of additional devices for shielding and placement for radiation therapy.
Patient Positioning: Supine
Comments: RTOG 2, on target, mupirocin BID
Detail Level: Detailed
Time Dose Fractionation (Optional- Include Units If Applicable): 97
Additional Prescription Justification Text: If there is any interruption in treatment exceeding 5 days please see Decay and Dose Adjustment Calculation and complete treatment under Prescription 2, 3 or 4 as appropriate.
Computed Treatment Time In Min (Will Render The Same As Calculated Treatment Time If Left Blank): 0.38
Simple Simulation Preamble Text Will Be Included With Simple Simulations (.......... Indications): Simple simulation was performed today for the following reasons:
Total Dose (Optional-Please Include Units): 5456.8 cGy
Custom Shielding Preamble Text Will Not Be Included With Simple Simulations (.......... X X Y Cm): A lead shield of 0.762 mm thickness is utilized to form a molded, custom shield with a
Depth (Optional-Please Include Units): 2.3mm
Cumulative Dose In Cgy (Optional): 3001.24
Custom Shielding Afterword Text Will Not Be Included With Simple Simulations (X X Y Cm............): port to correlate with the lesion size, including treatment margin. The custom lead shield is adequate to accommodate the appropriate applicator and provide adequate shielding around the treatment site. Additional shielding (as noted below) is used to protect sensitive, normal tissues.
Field Size (Applicator): 4.0 cm
Fractionation Number: 11
Shielding Size (Optional- Include Units): 3x3
Fractionation Number (Evaluation): 10
Assessment: Appropriate reaction
Functional Status: 0 (fully active)

## 2021-05-24 ENCOUNTER — APPOINTMENT (OUTPATIENT)
Age: 76
Setting detail: DERMATOLOGY
End: 2021-05-25

## 2021-05-24 PROBLEM — C44.722 SQUAMOUS CELL CARCINOMA OF SKIN OF RIGHT LOWER LIMB, INCLUDING HIP: Status: ACTIVE | Noted: 2021-05-24

## 2021-05-24 PROCEDURE — OTHER SUPERFICIAL RADIATION TREATMENT: OTHER

## 2021-05-24 PROCEDURE — 77401 RADIATION TX DELIVERY SUPFC: CPT

## 2021-05-24 PROCEDURE — OTHER TREATMENT REGIMEN: OTHER

## 2021-05-24 PROCEDURE — 77280 THER RAD SIMULAJ FIELD SMPL: CPT

## 2021-05-24 NOTE — PROCEDURE: SUPERFICIAL RADIATION TREATMENT
Fractionation Number (Evaluation): 5
Detail Level: Detailed
Number Of Treatment Days: 1
Field Size (Applicator): 4.0 cm
Comments: Ultrasound reveals a sugnificant increase in the depth of lesion.  Prior to SRT treatment, the lesion was debulked by a shave removal.
Bill For Simulation And Treatment Device Design: No
Bill For Radiation Treatment: Yes
Energy (Optional-Please Include Units): 100kV
Ssd In Cm (Optional): 15
Treatment Margins In Cm: 0.5
Custom Shielding Preamble Text Will Not Be Included With Simple Simulations (.......... X X Y Cm): A lead shield of 0.762 mm thickness is utilized to form a molded, custom shield with a
Dimensions-X Axis In Cm: 2
Depth (Optional-Please Include Units): 2.3mm
Treatment Time In Min (Optional): 0.38
Custom Shielding Afterword Text Will Not Be Included With Simple Simulations (X X Y Cm............): port to correlate with the lesion size, including treatment margin. The custom lead shield is adequate to accommodate the appropriate applicator and provide adequate shielding around the treatment site. Additional shielding (as noted below) is used to protect sensitive, normal tissues.
Dose Per Fractionation In Cgy (Optional): 272.84
Simple Simulation Afterword Text Will Be Included With Simple Simulations (Indications............): The patient had a complete consultation regarding all applicable modalities for the treatment of their skin cancer and based on a variety of factors including the type of tumor, size, and location, the relevant medical history as well as local tissue factors, the functional status of the individual, the ability to perform necessary postoperative wound instructions and the need for simultaneous treatments as well as overall wound healing status, it was determined that the patient would begin radiation therapy treatment for skin cancer.  A full simulation and treatment device design was performed including the determination and formulation of appropriate simple and complex devices including lead shield of 0.762 mm thickness to form molded customized shielding to specifically correlate with the lesion size including treatment margin.  The custom lead shield is adequate to accommodate the appropriate applicator and provide adequate shielding around the treatment site.  The specific field applicator, shields, and devices both simple and complex as well as the specific patient setup is outlined below.  The patient was given a full consent for superficial radiation to both verbally and in writing and the full determination of patient's eligibility for treatment and selection is outlined on the patient eligibility and treatment selection form.  The specific superficial radiotherapy prescription was determined and was documented on the superficial radiotherapy prescription form.  A treatment calculation was also performed and documented on the treatment calculation form.  Based on the prescription, the patient was scheduled for a series of fractional treatments.
Total Number Of Fractions: 20
Patient Positioning: Supine
Fractions / Week: 3
Assessment: Appropriate reaction
Treatment Device Design After Initial Simulation Justification (Will Render If Bill For Treatment Devices = Yes): The patient is status post radiation simulation and is evaluated as to the use of additional devices for shielding and placement for radiation therapy.
Total Dose (Optional-Please Include Units): 5456.8 cGy
Information: Selecting Yes will display possible errors in your note based on the variables you have selected. This validation is only offered as a suggestion for you. PLEASE NOTE THAT THE VALIDATION TEXT WILL BE REMOVED WHEN YOU FINALIZE YOUR NOTE. IF YOU WANT TO FAX A PRELIMINARY NOTE YOU WILL NEED TO TOGGLE THIS TO 'NO' IF YOU DO NOT WANT IT IN YOUR FAXED NOTE.
Intro Statement (Will Not Render If Left Blank): The patient is undergoing superficial radiation therapy for skin cancer and presents for weekly evaluation and management.  Per protocol and as documented on the flow sheet, the patient was questioned as to subjective redness, pruritus, pain, drainage, fatigue, or any other symptoms.  Objectively, the radiation area was evaluated with regards to erythema, atrophy, scale, crusting, erosion, ulceration, edema, purpura, tenderness, warmth, drainage, and any other findings.  The plan was extensively reviewed including the dose, and dosing schedule.  The simulation and clinical setup was also reviewed as was the external and any internal shields and based on this review the appropriateness and sufficiency of treatment was determined.
Simple Simulation Preamble Text Will Be Included With Simple Simulations (.......... Indications): Simple simulation was performed today for the following reasons:
Time Dose Fractionation (Optional- Include Units If Applicable): 97
Cumulative Dose In Cgy (Optional): 1909.88
Functional Status: 0 (fully active)
Fractionation Number: 7
Additional Prescription Justification Text: If there is any interruption in treatment exceeding 5 days please see Decay and Dose Adjustment Calculation and complete treatment under Prescription 2, 3 or 4 as appropriate.
Shielding Size (Optional- Include Units): 3x3
Energy (Include Units): 100

## 2021-05-26 ENCOUNTER — APPOINTMENT (OUTPATIENT)
Age: 76
Setting detail: DERMATOLOGY
End: 2021-05-29

## 2021-05-26 PROBLEM — C44.722 SQUAMOUS CELL CARCINOMA OF SKIN OF RIGHT LOWER LIMB, INCLUDING HIP: Status: ACTIVE | Noted: 2021-05-26

## 2021-05-26 PROCEDURE — OTHER SUPERFICIAL RADIATION TREATMENT: OTHER

## 2021-05-26 PROCEDURE — 77280 THER RAD SIMULAJ FIELD SMPL: CPT

## 2021-05-26 PROCEDURE — OTHER TREATMENT REGIMEN: OTHER

## 2021-05-26 PROCEDURE — 77401 RADIATION TX DELIVERY SUPFC: CPT

## 2021-05-26 NOTE — PROCEDURE: SUPERFICIAL RADIATION TREATMENT
Fractionation Number (Evaluation): 5
Treatment Time In Min (Optional): 0.38
Custom Shielding Afterword Text Will Not Be Included With Simple Simulations (X X Y Cm............): port to correlate with the lesion size, including treatment margin. The custom lead shield is adequate to accommodate the appropriate applicator and provide adequate shielding around the treatment site. Additional shielding (as noted below) is used to protect sensitive, normal tissues.
Prescription Used: 1
Depth (Optional-Please Include Units): 2.3mm
Include Rx 2 When Rendering Additional Prescriptions: No
Patient Positioning: Supine
Intro Statement (Will Not Render If Left Blank): The patient is undergoing superficial radiation therapy for skin cancer and presents for weekly evaluation and management.  Per protocol and as documented on the flow sheet, the patient was questioned as to subjective redness, pruritus, pain, drainage, fatigue, or any other symptoms.  Objectively, the radiation area was evaluated with regards to erythema, atrophy, scale, crusting, erosion, ulceration, edema, purpura, tenderness, warmth, drainage, and any other findings.  The plan was extensively reviewed including the dose, and dosing schedule.  The simulation and clinical setup was also reviewed as was the external and any internal shields and based on this review the appropriateness and sufficiency of treatment was determined.
Dose Per Fractionation In Cgy (Optional): 272.84
Include Rx 4 When Rendering Additional Prescriptions: Yes
Dimensions-X Axis In Cm: 2
Simple Simulation Afterword Text Will Be Included With Simple Simulations (Indications............): The patient had a complete consultation regarding all applicable modalities for the treatment of their skin cancer and based on a variety of factors including the type of tumor, size, and location, the relevant medical history as well as local tissue factors, the functional status of the individual, the ability to perform necessary postoperative wound instructions and the need for simultaneous treatments as well as overall wound healing status, it was determined that the patient would begin radiation therapy treatment for skin cancer.  A full simulation and treatment device design was performed including the determination and formulation of appropriate simple and complex devices including lead shield of 0.762 mm thickness to form molded customized shielding to specifically correlate with the lesion size including treatment margin.  The custom lead shield is adequate to accommodate the appropriate applicator and provide adequate shielding around the treatment site.  The specific field applicator, shields, and devices both simple and complex as well as the specific patient setup is outlined below.  The patient was given a full consent for superficial radiation to both verbally and in writing and the full determination of patient's eligibility for treatment and selection is outlined on the patient eligibility and treatment selection form.  The specific superficial radiotherapy prescription was determined and was documented on the superficial radiotherapy prescription form.  A treatment calculation was also performed and documented on the treatment calculation form.  Based on the prescription, the patient was scheduled for a series of fractional treatments.
Energy (Include Units): 100
Port Dimensions-X Axis In Cm: 3
Ssd In Cm (Optional): 15
Treatment Device Design After Initial Simulation Justification (Will Render If Bill For Treatment Devices = Yes): The patient is status post radiation simulation and is evaluated as to the use of additional devices for shielding and placement for radiation therapy.
Assessment: Appropriate reaction
Simple Simulation Preamble Text Will Be Included With Simple Simulations (.......... Indications): Simple simulation was performed today for the following reasons:
Energy (Optional-Please Include Units): 100kV
Time Dose Fractionation (Optional- Include Units If Applicable): 97
Field Size (Applicator): 4.0 cm
Additional Prescription Justification Text: If there is any interruption in treatment exceeding 5 days please see Decay and Dose Adjustment Calculation and complete treatment under Prescription 2, 3 or 4 as appropriate.
Treatment Margins In Cm: 0.5
Shielding Size (Optional- Include Units): 3x3
Total Number Of Fractions: 20
Information: Selecting Yes will display possible errors in your note based on the variables you have selected. This validation is only offered as a suggestion for you. PLEASE NOTE THAT THE VALIDATION TEXT WILL BE REMOVED WHEN YOU FINALIZE YOUR NOTE. IF YOU WANT TO FAX A PRELIMINARY NOTE YOU WILL NEED TO TOGGLE THIS TO 'NO' IF YOU DO NOT WANT IT IN YOUR FAXED NOTE.
Functional Status: 0 (fully active)
Custom Shielding Preamble Text Will Not Be Included With Simple Simulations (.......... X X Y Cm): A lead shield of 0.762 mm thickness is utilized to form a molded, custom shield with a
Fractionation Number: 8
Cumulative Dose In Cgy (Optional): 2182.72
Total Dose (Optional-Please Include Units): 5456.8 cGy
Comments: Ultrasound reveals a sugnificant increase in the depth of lesion.  Prior to SRT treatment, the lesion was debulked by a shave removal.
Detail Level: Detailed

## 2021-05-27 ENCOUNTER — APPOINTMENT (OUTPATIENT)
Age: 76
Setting detail: DERMATOLOGY
End: 2021-05-29

## 2021-05-27 PROBLEM — C44.722 SQUAMOUS CELL CARCINOMA OF SKIN OF RIGHT LOWER LIMB, INCLUDING HIP: Status: ACTIVE | Noted: 2021-05-27

## 2021-05-27 PROCEDURE — 77401 RADIATION TX DELIVERY SUPFC: CPT

## 2021-05-27 PROCEDURE — OTHER TREATMENT REGIMEN: OTHER

## 2021-05-27 PROCEDURE — 77280 THER RAD SIMULAJ FIELD SMPL: CPT

## 2021-05-27 PROCEDURE — OTHER SUPERFICIAL RADIATION TREATMENT: OTHER

## 2021-05-27 PROCEDURE — G6001 ECHO GUIDANCE RADIOTHERAPY: HCPCS

## 2021-05-27 NOTE — PROCEDURE: TREATMENT REGIMEN
Detail Level: Zone
Plan: This patient has been treated today with image guided superficial radiation therapy for non-melanoma skin cancer. Written informed consent has been previously obtained from this patient for this treatment. This consent is documented in the patient’s chart. The patient gave verbal consent to continue treatment today. The patient was treated with a specific radiation dose and setup as prescribed by the provider listed on this visit note. A Radiation Therapist performed administration of radiation under supervision of provider. The treatment parameters and cumulative dose are indicated above. Prior to administering the radiation, the patient underwent a verification therapeutic radiology simulation-aided field setting defining relevant normal and abnormal target anatomy and acquiring images with high frequency ultrasound in addition to data necessary developing optimal radiation treatment process for the patient. This process includes verification of the treatment port(s) and proper treatment positioning. All treatment ports were photographed within electronic medical record. The patient’s customized lead blocking along with gross tumor volume and margin was confirmed. Considering superficial radiotherapy is clinical in setup, this requires physician and radiation therapist to clarify location interest being treated against initial images, pathology and patient anatomy. Care was taken ensuring fields treated were geometrically accurate and properly positioned using therapeutic radiology simulation-aided field setting verification per fraction. This process is also utilized to determine if any prescription or setup changes are necessary. These steps are therefore medically necessary ensuring safe\\nand effective administration of radiation. Ongoing therapeutic radiology simulation-aided field setting verification is ordered throughout course of therapy.\\nA high frequency ultrasound image was acquired today for two-dimensional evaluation of the tumor volume and response to treatment, in addition to geometric accuracy of field placement. US depth Is 2.03mm, which is 1.8mm in difference from previous imaging. The field placement and ultrasound imaging, per fraction, is separate and distinct from the initial simulation, and is an important task in providing safe administration of superficial radiation therapy. Physician evaluation of the ultrasound tumor depth will be ongoing throughout the course of treatment, and is deemed medically necessary in order to ensure the efficacy of treatment and any necessary changes. Today’s image was evaluated for determination of continuation of treatment with the current plan or with necessary changes as appropriate. According to provider review of verification therapeutic radiology simulation-aided field setting and imaging, No change is required. Additionally, the use of ultrasound visualization and targeted assessment allows the patient to be able to see their cancer(s) progress, encouraging patient to complete and maintain compliance through full course of radiotherapy. Per Dr. Winter, continued ultrasound guidance and therapeutic radiology simulation-aided field setting verification per fraction is required for field placement, measurement of tumor depth, progress and acute effect monitoring.\\n

## 2021-05-27 NOTE — PROCEDURE: SUPERFICIAL RADIATION TREATMENT
Total Number Of Fractions Rx 3: 15
Information: Selecting Yes will display possible errors in your note based on the variables you have selected. This validation is only offered as a suggestion for you. PLEASE NOTE THAT THE VALIDATION TEXT WILL BE REMOVED WHEN YOU FINALIZE YOUR NOTE. IF YOU WANT TO FAX A PRELIMINARY NOTE YOU WILL NEED TO TOGGLE THIS TO 'NO' IF YOU DO NOT WANT IT IN YOUR FAXED NOTE.
Include Rx 2 When Rendering Additional Prescriptions: No
Dose / Tx In Cgy (Optional): 272.84
Include Rx 3 When Rendering Additional Prescriptions: Yes
Fractions / Week Rx 4: 5
Treatment Device Design After Initial Simulation Justification (Will Render If Bill For Treatment Devices = Yes): The patient is status post radiation simulation and is evaluated as to the use of additional devices for shielding and placement for radiation therapy.
Number Of Days Off Treatment: 1
Field Size (Applicator): 4.0 cm
Intro Statement (Will Not Render If Left Blank): The patient is undergoing superficial radiation therapy for skin cancer and presents for weekly evaluation and management.  Per protocol and as documented on the flow sheet, the patient was questioned as to subjective redness, pruritus, pain, drainage, fatigue, or any other symptoms.  Objectively, the radiation area was evaluated with regards to erythema, atrophy, scale, crusting, erosion, ulceration, edema, purpura, tenderness, warmth, drainage, and any other findings.  The plan was extensively reviewed including the dose, and dosing schedule.  The simulation and clinical setup was also reviewed as was the external and any internal shields and based on this review the appropriateness and sufficiency of treatment was determined.
Depth (Optional-Please Include Units): 2.3mm
Detail Level: Detailed
Custom Shielding Afterword Text Will Not Be Included With Simple Simulations (X X Y Cm............): port to correlate with the lesion size, including treatment margin. The custom lead shield is adequate to accommodate the appropriate applicator and provide adequate shielding around the treatment site. Additional shielding (as noted below) is used to protect sensitive, normal tissues.
Treatment Margins In Cm: 0.5
Additional Prescription Justification Text: If there is any interruption in treatment exceeding 5 days please see Decay and Dose Adjustment Calculation and complete treatment under Prescription 2, 3 or 4 as appropriate.
Cumulative Dose In Cgy (Optional): 0375.56
Computed Treatment Time In Min (Will Render The Same As Calculated Treatment Time If Left Blank): 0.38
Dimensions-X Axis In Cm: 2
Fractions / Week: 3
Fractionation Number: 9
Energy (Include Units): 100
Initial Radiation Treatment Planning (Will Render If Bill Simulation = Yes): The patient had a complete consultation regarding all applicable modalities for the treatment of their skin cancer and based on a variety of factors including the type of tumor, size, and location, the relevant medical history as well as local tissue factors, the functional status of the individual, the ability to perform necessary postoperative wound instructions and the need for simultaneous treatments as well as overall wound healing status, it was determined that the patient would begin radiation therapy treatment for skin cancer.  A full simulation and treatment device design was performed including the determination and formulation of appropriate simple and complex devices including lead shield of 0.762 mm thickness to form molded customized shielding to specifically correlate with the lesion size including treatment margin.  The custom lead shield is adequate to accommodate the appropriate applicator and provide adequate shielding around the treatment site.  The specific field applicator, shields, and devices both simple and complex as well as the specific patient setup is outlined below.  The patient was given a full consent for superficial radiation to both verbally and in writing and the full determination of patient's eligibility for treatment and selection is outlined on the patient eligibility and treatment selection form.  The specific superficial radiotherapy prescription was determined and was documented on the superficial radiotherapy prescription form.  A treatment calculation was also performed and documented on the treatment calculation form.  Based on the prescription, the patient was scheduled for a series of fractional treatments.
Time Dose Fractionation (Optional- Include Units If Applicable): 97
Assessment: Appropriate reaction
Simple Simulation Preamble Text Will Be Included With Simple Simulations (.......... Indications): Simple simulation was performed today for the following reasons:
Energy (Optional-Please Include Units): 100kV
Shielding Size (Optional- Include Units): 3x3
Total Number Of Fractions: 20
Functional Status: 0 (fully active)
Patient Positioning: Supine
Comments: Ultrasound reveals a sugnificant increase in the depth of lesion.  Prior to SRT treatment, the lesion was debulked by a shave removal.
Total Dose (Optional-Please Include Units): 5456.8 cGy
Custom Shielding Preamble Text Will Not Be Included With Simple Simulations (.......... X X Y Cm): A lead shield of 0.762 mm thickness is utilized to form a molded, custom shield with a

## 2021-06-02 ENCOUNTER — APPOINTMENT (OUTPATIENT)
Age: 76
Setting detail: DERMATOLOGY
End: 2021-06-07

## 2021-06-02 ENCOUNTER — RX ONLY (RX ONLY)
Age: 76
End: 2021-06-02

## 2021-06-02 PROBLEM — C44.722 SQUAMOUS CELL CARCINOMA OF SKIN OF RIGHT LOWER LIMB, INCLUDING HIP: Status: ACTIVE | Noted: 2021-06-02

## 2021-06-02 PROCEDURE — 77280 THER RAD SIMULAJ FIELD SMPL: CPT

## 2021-06-02 PROCEDURE — 77427 RADIATION TX MANAGEMENT X5: CPT

## 2021-06-02 PROCEDURE — 77401 RADIATION TX DELIVERY SUPFC: CPT

## 2021-06-02 PROCEDURE — OTHER SUPERFICIAL RADIATION TREATMENT: OTHER

## 2021-06-02 PROCEDURE — OTHER TREATMENT REGIMEN: OTHER

## 2021-06-02 PROCEDURE — G6001 ECHO GUIDANCE RADIOTHERAPY: HCPCS

## 2021-06-02 RX ORDER — MUPIROCIN 20 MG/G
OINTMENT TOPICAL
Qty: 1 | Refills: 0 | Status: ERX | COMMUNITY
Start: 2021-06-02

## 2021-06-02 NOTE — PROCEDURE: SUPERFICIAL RADIATION TREATMENT
Fractions / Week: 3
Fractions / Week Rx 2: 5
Field Size (Applicator): 4.0 cm
Ssd In Cm (Optional): 15
Treatment Device Design After Initial Simulation Justification (Will Render If Bill For Treatment Devices = Yes): The patient is status post radiation simulation and is evaluated as to the use of additional devices for shielding and placement for radiation therapy.
Prescription Used: 1
Treatment Time / Fractionation (Optional- Include Units): 0.38
Assessment: Appropriate reaction
Diego For Simulation Without Treatment Device Design (Simple Simulation): No
Bill And Render Text From Evaluation And Management Tab (Will Bill [id number]): Yes
Cumulative Dose In Cgy (Optional): 2728.4
Dimensions-Y Axis In Cm: 2
Time Dose Fractionation (Optional- Include Units If Applicable): 97
Detail Level: Detailed
Additional Prescription Justification Text: If there is any interruption in treatment exceeding 5 days please see Decay and Dose Adjustment Calculation and complete treatment under Prescription 2, 3 or 4 as appropriate.
Shielding Size (Optional- Include Units): 3x3
Total Number Of Fractions: 20
Custom Shielding Afterword Text Will Not Be Included With Simple Simulations (X X Y Cm............): port to correlate with the lesion size, including treatment margin. The custom lead shield is adequate to accommodate the appropriate applicator and provide adequate shielding around the treatment site. Additional shielding (as noted below) is used to protect sensitive, normal tissues.
Fractionation Number (Evaluation): 10
Treatment Margins In Cm: 0.5
Dose Per Fractionation In Cgy (Optional): 272.84
Intro Statement (Will Not Render If Left Blank): The patient is undergoing superficial radiation therapy for skin cancer and presents for weekly evaluation and management.  Per protocol and as documented on the flow sheet, the patient was questioned as to subjective redness, pruritus, pain, drainage, fatigue, or any other symptoms.  Objectively, the radiation area was evaluated with regards to erythema, atrophy, scale, crusting, erosion, ulceration, edema, purpura, tenderness, warmth, drainage, and any other findings.  The plan was extensively reviewed including the dose, and dosing schedule.  The simulation and clinical setup was also reviewed as was the external and any internal shields and based on this review the appropriateness and sufficiency of treatment was determined.
Simple Simulation Afterword Text Will Be Included With Simple Simulations (Indications............): The patient had a complete consultation regarding all applicable modalities for the treatment of their skin cancer and based on a variety of factors including the type of tumor, size, and location, the relevant medical history as well as local tissue factors, the functional status of the individual, the ability to perform necessary postoperative wound instructions and the need for simultaneous treatments as well as overall wound healing status, it was determined that the patient would begin radiation therapy treatment for skin cancer.  A full simulation and treatment device design was performed including the determination and formulation of appropriate simple and complex devices including lead shield of 0.762 mm thickness to form molded customized shielding to specifically correlate with the lesion size including treatment margin.  The custom lead shield is adequate to accommodate the appropriate applicator and provide adequate shielding around the treatment site.  The specific field applicator, shields, and devices both simple and complex as well as the specific patient setup is outlined below.  The patient was given a full consent for superficial radiation to both verbally and in writing and the full determination of patient's eligibility for treatment and selection is outlined on the patient eligibility and treatment selection form.  The specific superficial radiotherapy prescription was determined and was documented on the superficial radiotherapy prescription form.  A treatment calculation was also performed and documented on the treatment calculation form.  Based on the prescription, the patient was scheduled for a series of fractional treatments.
Patient Positioning: Supine
Information: Selecting Yes will display possible errors in your note based on the variables you have selected. This validation is only offered as a suggestion for you. PLEASE NOTE THAT THE VALIDATION TEXT WILL BE REMOVED WHEN YOU FINALIZE YOUR NOTE. IF YOU WANT TO FAX A PRELIMINARY NOTE YOU WILL NEED TO TOGGLE THIS TO 'NO' IF YOU DO NOT WANT IT IN YOUR FAXED NOTE.
Energy (Optional-Please Include Units): 100kV
Total Dose (Optional-Please Include Units): 5456.8 cGy
Functional Status: 0 (fully active)
Custom Shielding Preamble Text Will Not Be Included With Simple Simulations (.......... X X Y Cm): A lead shield of 0.762 mm thickness is utilized to form a molded, custom shield with a
Comments: RTOG 2, on target, mupirocin BID
Depth (Optional-Please Include Units): 2.3mm
Simple Simulation Preamble Text Will Be Included With Simple Simulations (.......... Indications): Simple simulation was performed today for the following reasons:
Energy (Include Units): 100

## 2021-06-03 ENCOUNTER — APPOINTMENT (OUTPATIENT)
Age: 76
Setting detail: DERMATOLOGY
End: 2021-06-07

## 2021-06-03 PROBLEM — C44.722 SQUAMOUS CELL CARCINOMA OF SKIN OF RIGHT LOWER LIMB, INCLUDING HIP: Status: ACTIVE | Noted: 2021-06-03

## 2021-06-03 PROCEDURE — OTHER TREATMENT REGIMEN: OTHER

## 2021-06-03 PROCEDURE — 77280 THER RAD SIMULAJ FIELD SMPL: CPT

## 2021-06-03 PROCEDURE — 77401 RADIATION TX DELIVERY SUPFC: CPT

## 2021-06-03 PROCEDURE — OTHER FOLLOW UP FOR NEXT VISIT: OTHER

## 2021-06-03 PROCEDURE — G6001 ECHO GUIDANCE RADIOTHERAPY: HCPCS

## 2021-06-03 PROCEDURE — OTHER SUPERFICIAL RADIATION TREATMENT: OTHER

## 2021-06-03 NOTE — PROCEDURE: TREATMENT REGIMEN
Plan: This patient has been treated today with image guided superficial radiation therapy for non-melanoma skin cancer. Written informed consent has been previously obtained from this patient for this treatment. This consent is documented in the patient’s chart. The patient gave verbal consent to continue treatment today. The patient was treated with a specific radiation dose and setup as prescribed by the provider listed on this visit note. A Radiation Therapist performed administration of radiation under supervision of provider. The treatment parameters and cumulative dose are indicated above. Prior to administering the radiation, the patient underwent a verification therapeutic radiology simulation-aided field setting defining relevant normal and abnormal target anatomy and acquiring images with high frequency ultrasound in addition to data necessary developing optimal radiation treatment process for the patient. This process includes verification of the treatment port(s) and proper treatment positioning. All treatment ports were photographed within electronic medical record. The patient’s customized lead blocking along with gross tumor volume and margin was confirmed. Considering superficial radiotherapy is clinical in setup, this requires physician and radiation therapist to clarify location interest being treated against initial images, pathology and patient anatomy. Care was taken ensuring fields treated were geometrically accurate and properly positioned using therapeutic radiology simulation-aided field setting verification per fraction. This process is also utilized to determine if any prescription or setup changes are necessary. These steps are therefore medically necessary ensuring safe\\nand effective administration of radiation. Ongoing therapeutic radiology simulation-aided field setting verification is ordered throughout course of therapy.\\nA high frequency ultrasound image was acquired today for two-dimensional evaluation of the tumor volume and response to treatment, in addition to geometric accuracy of field placement. US depth Is 2.34mm, which is 0.17mm in difference from previous imaging. The field placement and ultrasound imaging, per fraction, is separate and distinct from the initial simulation, and is an important task in providing safe administration of superficial radiation therapy. Physician evaluation of the ultrasound tumor depth will be ongoing throughout the course of treatment, and is deemed medically necessary in order to ensure the efficacy of treatment and any necessary changes. Today’s image was evaluated for determination of continuation of treatment with the current plan or with necessary changes as appropriate. According to provider review of verification therapeutic radiology simulation-aided field setting and imaging, No change is required. Additionally, the use of ultrasound visualization and targeted assessment allows the patient to be able to see their cancer(s) progress, encouraging patient to complete and maintain compliance through full course of radiotherapy. Per Dr. Winter, continued ultrasound guidance and therapeutic radiology simulation-aided field setting verification per fraction is required for field placement, measurement of tumor depth, progress and acute effect monitoring.\\n
Detail Level: Zone

## 2021-06-03 NOTE — PROCEDURE: SUPERFICIAL RADIATION TREATMENT
Custom Shielding Afterword Text Will Not Be Included With Simple Simulations (X X Y Cm............): port to correlate with the lesion size, including treatment margin. The custom lead shield is adequate to accommodate the appropriate applicator and provide adequate shielding around the treatment site. Additional shielding (as noted below) is used to protect sensitive, normal tissues.
Time Dose Fractionation (Optional- Include Units If Applicable): 97
Field Size (Applicator): 4.0 cm
Treatment Time In Min (Optional): 0.38
Comments: RTOG 2, on target, mupirocin BID
Dose Per Fractionation In Cgy (Optional): 272.84
Total Number Of Fractions Rx 2: 15
Bill For Dosimetry/Render Decay And Dose Adjustment Calculation In Note: No
Simple Simulation Afterword Text Will Be Included With Simple Simulations (Indications............): The patient had a complete consultation regarding all applicable modalities for the treatment of their skin cancer and based on a variety of factors including the type of tumor, size, and location, the relevant medical history as well as local tissue factors, the functional status of the individual, the ability to perform necessary postoperative wound instructions and the need for simultaneous treatments as well as overall wound healing status, it was determined that the patient would begin radiation therapy treatment for skin cancer.  A full simulation and treatment device design was performed including the determination and formulation of appropriate simple and complex devices including lead shield of 0.762 mm thickness to form molded customized shielding to specifically correlate with the lesion size including treatment margin.  The custom lead shield is adequate to accommodate the appropriate applicator and provide adequate shielding around the treatment site.  The specific field applicator, shields, and devices both simple and complex as well as the specific patient setup is outlined below.  The patient was given a full consent for superficial radiation to both verbally and in writing and the full determination of patient's eligibility for treatment and selection is outlined on the patient eligibility and treatment selection form.  The specific superficial radiotherapy prescription was determined and was documented on the superficial radiotherapy prescription form.  A treatment calculation was also performed and documented on the treatment calculation form.  Based on the prescription, the patient was scheduled for a series of fractional treatments.
Total Number Of Fractions: 20
Shielding Size (Optional- Include Units): 3x3
Bill For Radiation Treatment: Yes
Fractionation Number (Evaluation): 10
Fractions / Week Rx 4: 5
Port Dimensions-X Axis In Cm: 3
Detail Level: Detailed
Dimensions-Y Axis In Cm: 2
Total Dose (Optional-Please Include Units): 5456.8 cGy
Treatment Device Design After Initial Simulation Justification (Will Render If Bill For Treatment Devices = Yes): The patient is status post radiation simulation and is evaluated as to the use of additional devices for shielding and placement for radiation therapy.
Energy (Optional-Please Include Units): 100kV
Information: Selecting Yes will display possible errors in your note based on the variables you have selected. This validation is only offered as a suggestion for you. PLEASE NOTE THAT THE VALIDATION TEXT WILL BE REMOVED WHEN YOU FINALIZE YOUR NOTE. IF YOU WANT TO FAX A PRELIMINARY NOTE YOU WILL NEED TO TOGGLE THIS TO 'NO' IF YOU DO NOT WANT IT IN YOUR FAXED NOTE.
Cumulative Dose In Cgy (Optional): 3001.24
Functional Status: 0 (fully active)
Depth (Optional-Please Include Units): 2.3mm
Additional Prescription Justification Text: If there is any interruption in treatment exceeding 5 days please see Decay and Dose Adjustment Calculation and complete treatment under Prescription 2, 3 or 4 as appropriate.
Fractionation Number: 11
Number Of Treatment Days: 1
Energy (Include Units): 100
Patient Positioning: Supine
Treatment Margins In Cm: 0.5
Custom Shielding Preamble Text Will Not Be Included With Simple Simulations (.......... X X Y Cm): A lead shield of 0.762 mm thickness is utilized to form a molded, custom shield with a
Assessment: Appropriate reaction
Intro Statement (Will Not Render If Left Blank): The patient is undergoing superficial radiation therapy for skin cancer and presents for weekly evaluation and management.  Per protocol and as documented on the flow sheet, the patient was questioned as to subjective redness, pruritus, pain, drainage, fatigue, or any other symptoms.  Objectively, the radiation area was evaluated with regards to erythema, atrophy, scale, crusting, erosion, ulceration, edema, purpura, tenderness, warmth, drainage, and any other findings.  The plan was extensively reviewed including the dose, and dosing schedule.  The simulation and clinical setup was also reviewed as was the external and any internal shields and based on this review the appropriateness and sufficiency of treatment was determined.
Simple Simulation Preamble Text Will Be Included With Simple Simulations (.......... Indications): Simple simulation was performed today for the following reasons:

## 2021-06-07 ENCOUNTER — APPOINTMENT (OUTPATIENT)
Age: 76
Setting detail: DERMATOLOGY
End: 2021-06-11

## 2021-06-07 PROBLEM — C44.722 SQUAMOUS CELL CARCINOMA OF SKIN OF RIGHT LOWER LIMB, INCLUDING HIP: Status: ACTIVE | Noted: 2021-06-07

## 2021-06-07 PROCEDURE — OTHER SUPERFICIAL RADIATION TREATMENT: OTHER

## 2021-06-07 PROCEDURE — OTHER TREATMENT REGIMEN: OTHER

## 2021-06-07 PROCEDURE — G6001 ECHO GUIDANCE RADIOTHERAPY: HCPCS

## 2021-06-07 PROCEDURE — OTHER FOLLOW UP FOR NEXT VISIT: OTHER

## 2021-06-07 PROCEDURE — 77401 RADIATION TX DELIVERY SUPFC: CPT

## 2021-06-07 PROCEDURE — 77280 THER RAD SIMULAJ FIELD SMPL: CPT

## 2021-06-07 NOTE — PROCEDURE: TREATMENT REGIMEN
Detail Level: Zone
Plan: This patient has been treated today with image guided superficial radiation therapy for non-melanoma skin cancer. Written informed consent has been previously obtained from this patient for this treatment. This consent is documented in the patient’s chart. The patient gave verbal consent to continue treatment today. The patient was treated with a specific radiation dose and setup as prescribed by the provider listed on this visit note. A Radiation Therapist performed administration of radiation under supervision of provider. The treatment parameters and cumulative dose are indicated above. Prior to administering the radiation, the patient underwent a verification therapeutic radiology simulation-aided field setting defining relevant normal and abnormal target anatomy and acquiring images with high frequency ultrasound in addition to data necessary developing optimal radiation treatment process for the patient. This process includes verification of the treatment port(s) and proper treatment positioning. All treatment ports were photographed within electronic medical record. The patient’s customized lead blocking along with gross tumor volume and margin was confirmed. Considering superficial radiotherapy is clinical in setup, this requires physician and radiation therapist to clarify location interest being treated against initial images, pathology and patient anatomy. Care was taken ensuring fields treated were geometrically accurate and properly positioned using therapeutic radiology simulation-aided field setting verification per fraction. This process is also utilized to determine if any prescription or setup changes are necessary. These steps are therefore medically necessary ensuring safe\\nand effective administration of radiation. Ongoing therapeutic radiology simulation-aided field setting verification is ordered throughout course of therapy.\\nA high frequency ultrasound image was acquired today for two-dimensional evaluation of the tumor volume and response to treatment, in addition to geometric accuracy of field placement. US depth Is 2.55mm, which is 0.21mm in difference from previous imaging. The field placement and ultrasound imaging, per fraction, is separate and distinct from the initial simulation, and is an important task in providing safe administration of superficial radiation therapy. Physician evaluation of the ultrasound tumor depth will be ongoing throughout the course of treatment and is deemed medically necessary in order to ensure the efficacy of treatment and any necessary changes. Today’s image was evaluated for determination of continuation of treatment with the current plan or with necessary changes as appropriate. According to provider review of verification therapeutic radiology simulation-aided field setting and imaging, no change is required. Additionally, the use of ultrasound visualization and targeted assessment allows the patient to be able to see their cancer(s) progress, encouraging patient to complete and maintain compliance through full course of radiotherapy. Per Dr. Winter, continued ultrasound guidance and therapeutic radiology simulation-aided field setting verification per fraction is required for field placement, measurement of tumor depth, progress and acute effect monitoring.\\n

## 2021-06-07 NOTE — PROCEDURE: SUPERFICIAL RADIATION TREATMENT
Number Of Treatment Days: 1
Computed Treatment Time In Min (Will Render The Same As Calculated Treatment Time If Left Blank): 0.38
Additional Prescription Justification Text: If there is any interruption in treatment exceeding 5 days please see Decay and Dose Adjustment Calculation and complete treatment under Prescription 2, 3 or 4 as appropriate.
Dose Per Fractionation In Cgy (Optional): 272.84
Bill For Dosimetry/Render Decay And Dose Adjustment Calculation In Note: No
Bill For Radiation Treatment: Yes
Comments: RTOG 2, on target, mupirocin BID
Fractionation Number: 12
Total Number Of Fractions Rx 2: 15
Dimensions-Y Axis In Cm: 2
Energy (Optional-Please Include Units): 100kV
Total Number Of Fractions: 20
Information: Selecting Yes will display possible errors in your note based on the variables you have selected. This validation is only offered as a suggestion for you. PLEASE NOTE THAT THE VALIDATION TEXT WILL BE REMOVED WHEN YOU FINALIZE YOUR NOTE. IF YOU WANT TO FAX A PRELIMINARY NOTE YOU WILL NEED TO TOGGLE THIS TO 'NO' IF YOU DO NOT WANT IT IN YOUR FAXED NOTE.
Total Dose (Optional-Please Include Units): 5456.8 cGy
Detail Level: Detailed
Simple Simulation Afterword Text Will Be Included With Simple Simulations (Indications............): The patient had a complete consultation regarding all applicable modalities for the treatment of their skin cancer and based on a variety of factors including the type of tumor, size, and location, the relevant medical history as well as local tissue factors, the functional status of the individual, the ability to perform necessary postoperative wound instructions and the need for simultaneous treatments as well as overall wound healing status, it was determined that the patient would begin radiation therapy treatment for skin cancer.  A full simulation and treatment device design was performed including the determination and formulation of appropriate simple and complex devices including lead shield of 0.762 mm thickness to form molded customized shielding to specifically correlate with the lesion size including treatment margin.  The custom lead shield is adequate to accommodate the appropriate applicator and provide adequate shielding around the treatment site.  The specific field applicator, shields, and devices both simple and complex as well as the specific patient setup is outlined below.  The patient was given a full consent for superficial radiation to both verbally and in writing and the full determination of patient's eligibility for treatment and selection is outlined on the patient eligibility and treatment selection form.  The specific superficial radiotherapy prescription was determined and was documented on the superficial radiotherapy prescription form.  A treatment calculation was also performed and documented on the treatment calculation form.  Based on the prescription, the patient was scheduled for a series of fractional treatments.
Fractions / Week Rx 4: 5
Field Size (Applicator): 4.0 cm
Custom Shielding Afterword Text Will Not Be Included With Simple Simulations (X X Y Cm............): port to correlate with the lesion size, including treatment margin. The custom lead shield is adequate to accommodate the appropriate applicator and provide adequate shielding around the treatment site. Additional shielding (as noted below) is used to protect sensitive, normal tissues.
Port Dimensions-X Axis In Cm: 3
Intro Statement (Will Not Render If Left Blank): The patient is undergoing superficial radiation therapy for skin cancer and presents for weekly evaluation and management.  Per protocol and as documented on the flow sheet, the patient was questioned as to subjective redness, pruritus, pain, drainage, fatigue, or any other symptoms.  Objectively, the radiation area was evaluated with regards to erythema, atrophy, scale, crusting, erosion, ulceration, edema, purpura, tenderness, warmth, drainage, and any other findings.  The plan was extensively reviewed including the dose, and dosing schedule.  The simulation and clinical setup was also reviewed as was the external and any internal shields and based on this review the appropriateness and sufficiency of treatment was determined.
Fractionation Number (Evaluation): 10
Treatment Margins In Cm: 0.5
Depth (Optional-Please Include Units): 2.3mm
Time Dose Fractionation (Optional- Include Units If Applicable): 97
Custom Shielding Preamble Text Will Not Be Included With Simple Simulations (.......... X X Y Cm): A lead shield of 0.762 mm thickness is utilized to form a molded, custom shield with a
Cumulative Dose In Cgy (Optional): 3274.08
Simple Simulation Preamble Text Will Be Included With Simple Simulations (.......... Indications): Simple simulation was performed today for the following reasons:
Energy (Include Units): 100
Patient Positioning: Supine
Treatment Device Design After Initial Simulation Justification (Will Render If Bill For Treatment Devices = Yes): The patient is status post radiation simulation and is evaluated as to the use of additional devices for shielding and placement for radiation therapy.
Functional Status: 0 (fully active)
Assessment: Appropriate reaction
Shielding Size (Optional- Include Units): 3x3

## 2021-06-09 ENCOUNTER — APPOINTMENT (OUTPATIENT)
Age: 76
Setting detail: DERMATOLOGY
End: 2021-06-12

## 2021-06-09 PROBLEM — C44.722 SQUAMOUS CELL CARCINOMA OF SKIN OF RIGHT LOWER LIMB, INCLUDING HIP: Status: ACTIVE | Noted: 2021-06-09

## 2021-06-09 PROCEDURE — 77280 THER RAD SIMULAJ FIELD SMPL: CPT

## 2021-06-09 PROCEDURE — OTHER FOLLOW UP FOR NEXT VISIT: OTHER

## 2021-06-09 PROCEDURE — OTHER SUPERFICIAL RADIATION TREATMENT: OTHER

## 2021-06-09 PROCEDURE — OTHER TREATMENT REGIMEN: OTHER

## 2021-06-09 PROCEDURE — 77401 RADIATION TX DELIVERY SUPFC: CPT

## 2021-06-09 PROCEDURE — G6001 ECHO GUIDANCE RADIOTHERAPY: HCPCS

## 2021-06-09 NOTE — PROCEDURE: TREATMENT REGIMEN
Plan: This patient has been treated today with image guided superficial radiation therapy for non-melanoma skin cancer. Written informed consent has been previously obtained from this patient for this treatment. This consent is documented in the patient’s chart. The patient gave verbal consent to continue treatment today. The patient was treated with a specific radiation dose and setup as prescribed by the provider listed on this visit note. A Radiation Therapist performed administration of radiation under supervision of provider. The treatment parameters and cumulative dose are indicated above. Prior to administering the radiation, the patient underwent a verification therapeutic radiology simulation-aided field setting defining relevant normal and abnormal target anatomy and acquiring images with high frequency ultrasound in addition to data necessary developing optimal radiation treatment process for the patient. This process includes verification of the treatment port(s) and proper treatment positioning. All treatment ports were photographed within electronic medical record. The patient’s customized lead blocking along with gross tumor volume and margin was confirmed. Considering superficial radiotherapy is clinical in setup, this requires physician and radiation therapist to clarify location interest being treated against initial images, pathology and patient anatomy. Care was taken ensuring fields treated were geometrically accurate and properly positioned using therapeutic radiology simulation-aided field setting verification per fraction. This process is also utilized to determine if any prescription or setup changes are necessary. These steps are therefore medically necessary ensuring safe\\nand effective administration of radiation. Ongoing therapeutic radiology simulation-aided field setting verification is ordered throughout course of therapy.\\nA high frequency ultrasound image was acquired today for two-dimensional evaluation of the tumor volume and response to treatment, in addition to geometric accuracy of field placement. US depth Is 2.51mm, which is 0.04mm in difference from previous imaging. The field placement and ultrasound imaging, per fraction, is separate and distinct from the initial simulation, and is an important task in providing safe administration of superficial radiation therapy. Physician evaluation of the ultrasound tumor depth will be ongoing throughout the course of treatment and is deemed medically necessary in order to ensure the efficacy of treatment and any necessary changes. Today’s image was evaluated for determination of continuation of treatment with the current plan or with necessary changes as appropriate. According to provider review of verification therapeutic radiology simulation-aided field setting and imaging, no change is required. Additionally, the use of ultrasound visualization and targeted assessment allows the patient to be able to see their cancer(s) progress, encouraging patient to complete and maintain compliance through full course of radiotherapy. Per Dr. Winter, continued ultrasound guidance and therapeutic radiology simulation-aided field setting verification per fraction is required for field placement, measurement of tumor depth, progress and acute effect monitoring.\\n
Detail Level: Zone

## 2021-06-09 NOTE — PROCEDURE: SUPERFICIAL RADIATION TREATMENT
Field Size (Applicator): 4.0 cm
Energy (Optional-Please Include Units): 100kV
Custom Shielding Afterword Text Will Not Be Included With Simple Simulations (X X Y Cm............): port to correlate with the lesion size, including treatment margin. The custom lead shield is adequate to accommodate the appropriate applicator and provide adequate shielding around the treatment site. Additional shielding (as noted below) is used to protect sensitive, normal tissues.
Total Dose (Optional-Please Include Units): 5456.8 cGy
Energy (Include Units): 100
Fractions / Week Rx 4: 5
Simple Simulation Afterword Text Will Be Included With Simple Simulations (Indications............): The patient had a complete consultation regarding all applicable modalities for the treatment of their skin cancer and based on a variety of factors including the type of tumor, size, and location, the relevant medical history as well as local tissue factors, the functional status of the individual, the ability to perform necessary postoperative wound instructions and the need for simultaneous treatments as well as overall wound healing status, it was determined that the patient would begin radiation therapy treatment for skin cancer.  A full simulation and treatment device design was performed including the determination and formulation of appropriate simple and complex devices including lead shield of 0.762 mm thickness to form molded customized shielding to specifically correlate with the lesion size including treatment margin.  The custom lead shield is adequate to accommodate the appropriate applicator and provide adequate shielding around the treatment site.  The specific field applicator, shields, and devices both simple and complex as well as the specific patient setup is outlined below.  The patient was given a full consent for superficial radiation to both verbally and in writing and the full determination of patient's eligibility for treatment and selection is outlined on the patient eligibility and treatment selection form.  The specific superficial radiotherapy prescription was determined and was documented on the superficial radiotherapy prescription form.  A treatment calculation was also performed and documented on the treatment calculation form.  Based on the prescription, the patient was scheduled for a series of fractional treatments.
Fractionation Number: 13
Shielding Size (Optional- Include Units): 3x3
Fractionation Number (Evaluation): 10
Total Number Of Fractions Rx 2: 15
Dimensions-Y Axis In Cm: 2
Bill For Treatment Devices Only: No
Port Dimensions-Y Axis In Cm: 3
Treatment Time / Fractionation (Optional- Include Units): 0.38
Functional Status: 0 (fully active)
Patient Positioning: Supine
Include Rx 4 When Rendering Additional Prescriptions: Yes
Treatment Margins In Cm: 0.5
Comments: RTOG 2, on target, mupirocin BID
Number Of Treatment Days: 1
Assessment: Appropriate reaction
Intro Statement (Will Not Render If Left Blank): The patient is undergoing superficial radiation therapy for skin cancer and presents for weekly evaluation and management.  Per protocol and as documented on the flow sheet, the patient was questioned as to subjective redness, pruritus, pain, drainage, fatigue, or any other symptoms.  Objectively, the radiation area was evaluated with regards to erythema, atrophy, scale, crusting, erosion, ulceration, edema, purpura, tenderness, warmth, drainage, and any other findings.  The plan was extensively reviewed including the dose, and dosing schedule.  The simulation and clinical setup was also reviewed as was the external and any internal shields and based on this review the appropriateness and sufficiency of treatment was determined.
Treatment Device Design After Initial Simulation Justification (Will Render If Bill For Treatment Devices = Yes): The patient is status post radiation simulation and is evaluated as to the use of additional devices for shielding and placement for radiation therapy.
Time Dose Fractionation (Optional- Include Units If Applicable): 97
Detail Level: Detailed
Dose / Tx In Cgy (Optional): 272.84
Information: Selecting Yes will display possible errors in your note based on the variables you have selected. This validation is only offered as a suggestion for you. PLEASE NOTE THAT THE VALIDATION TEXT WILL BE REMOVED WHEN YOU FINALIZE YOUR NOTE. IF YOU WANT TO FAX A PRELIMINARY NOTE YOU WILL NEED TO TOGGLE THIS TO 'NO' IF YOU DO NOT WANT IT IN YOUR FAXED NOTE.
Additional Prescription Justification Text: If there is any interruption in treatment exceeding 5 days please see Decay and Dose Adjustment Calculation and complete treatment under Prescription 2, 3 or 4 as appropriate.
Custom Shielding Preamble Text Will Not Be Included With Simple Simulations (.......... X X Y Cm): A lead shield of 0.762 mm thickness is utilized to form a molded, custom shield with a
Cumulative Dose In Cgy (Optional): 3546.92
Simple Simulation Preamble Text Will Be Included With Simple Simulations (.......... Indications): Simple simulation was performed today for the following reasons:
Total Number Of Fractions: 20
Depth (Optional-Please Include Units): 2.3mm

## 2021-06-10 ENCOUNTER — APPOINTMENT (OUTPATIENT)
Age: 76
Setting detail: DERMATOLOGY
End: 2021-06-13

## 2021-06-10 PROBLEM — Z85.820 PERSONAL HISTORY OF MALIGNANT MELANOMA OF SKIN: Status: ACTIVE | Noted: 2021-06-10

## 2021-06-10 PROBLEM — E78.5 HYPERLIPIDEMIA, UNSPECIFIED: Status: ACTIVE | Noted: 2021-06-10

## 2021-06-10 PROBLEM — C44.722 SQUAMOUS CELL CARCINOMA OF SKIN OF RIGHT LOWER LIMB, INCLUDING HIP: Status: ACTIVE | Noted: 2021-06-10

## 2021-06-10 PROCEDURE — OTHER FOLLOW UP FOR NEXT VISIT: OTHER

## 2021-06-10 PROCEDURE — OTHER SUPERFICIAL RADIATION TREATMENT: OTHER

## 2021-06-10 PROCEDURE — G6001 ECHO GUIDANCE RADIOTHERAPY: HCPCS

## 2021-06-10 PROCEDURE — 77280 THER RAD SIMULAJ FIELD SMPL: CPT

## 2021-06-10 PROCEDURE — 77401 RADIATION TX DELIVERY SUPFC: CPT

## 2021-06-10 PROCEDURE — OTHER TREATMENT REGIMEN: OTHER

## 2021-06-10 NOTE — PROCEDURE: TREATMENT REGIMEN
Detail Level: Zone
Plan: This patient has been treated today with image guided superficial radiation therapy for non-melanoma skin cancer. Written informed consent has been previously obtained from this patient for this treatment. This consent is documented in the patient’s chart. The patient gave verbal consent to continue treatment today. The patient was treated with a specific radiation dose and setup as prescribed by the provider listed on this visit note. A Radiation Therapist performed administration of radiation under supervision of provider. The treatment parameters and cumulative dose are indicated above. Prior to administering the radiation, the patient underwent a verification therapeutic radiology simulation-aided field setting defining relevant normal and abnormal target anatomy and acquiring images with high frequency ultrasound in addition to data necessary developing optimal radiation treatment process for the patient. This process includes verification of the treatment port(s) and proper treatment positioning. All treatment ports were photographed within electronic medical record. The patient’s customized lead blocking along with gross tumor volume and margin was confirmed. Considering superficial radiotherapy is clinical in setup, this requires physician and radiation therapist to clarify location interest being treated against initial images, pathology and patient anatomy. Care was taken ensuring fields treated were geometrically accurate and properly positioned using therapeutic radiology simulation-aided field setting verification per fraction. This process is also utilized to determine if any prescription or setup changes are necessary. These steps are therefore medically necessary ensuring safe\\nand effective administration of radiation. Ongoing therapeutic radiology simulation-aided field setting verification is ordered throughout course of therapy.\\nA high frequency ultrasound image was acquired today for two-dimensional evaluation of the tumor volume and response to treatment, in addition to geometric accuracy of field placement. US depth Is 2.51mm, which is 0.24mm in difference from previous imaging. The field placement and ultrasound imaging, per fraction, is separate and distinct from the initial simulation, and is an important task in providing safe administration of superficial radiation therapy. Physician evaluation of the ultrasound tumor depth will be ongoing throughout the course of treatment and is deemed medically necessary in order to ensure the efficacy of treatment and any necessary changes. Today’s image was evaluated for determination of continuation of treatment with the current plan or with necessary changes as appropriate. According to provider review of verification therapeutic radiology simulation-aided field setting and imaging, no change is required. Additionally, the use of ultrasound visualization and targeted assessment allows the patient to be able to see their cancer(s) progress, encouraging patient to complete and maintain compliance through full course of radiotherapy. Per Dr. Winter, continued ultrasound guidance and therapeutic radiology simulation-aided field setting verification per fraction is required for field placement, measurement of tumor depth, progress and acute effect monitoring.\\n

## 2021-06-10 NOTE — PROCEDURE: SUPERFICIAL RADIATION TREATMENT
Fractions / Week: 3
Total Dose (Optional-Please Include Units): 5456.8 cGy
Detail Level: Detailed
Prescription Used: 1
Dimensions-Y Axis In Cm: 2
Bill And Render Text From Evaluation And Management Tab (Will Bill [id number]): No
Energy (Optional-Please Include Units): 100kV
Patient Positioning: Supine
Bill For Radiation Treatment: Yes
Simple Simulation Afterword Text Will Be Included With Simple Simulations (Indications............): The patient had a complete consultation regarding all applicable modalities for the treatment of their skin cancer and based on a variety of factors including the type of tumor, size, and location, the relevant medical history as well as local tissue factors, the functional status of the individual, the ability to perform necessary postoperative wound instructions and the need for simultaneous treatments as well as overall wound healing status, it was determined that the patient would begin radiation therapy treatment for skin cancer.  A full simulation and treatment device design was performed including the determination and formulation of appropriate simple and complex devices including lead shield of 0.762 mm thickness to form molded customized shielding to specifically correlate with the lesion size including treatment margin.  The custom lead shield is adequate to accommodate the appropriate applicator and provide adequate shielding around the treatment site.  The specific field applicator, shields, and devices both simple and complex as well as the specific patient setup is outlined below.  The patient was given a full consent for superficial radiation to both verbally and in writing and the full determination of patient's eligibility for treatment and selection is outlined on the patient eligibility and treatment selection form.  The specific superficial radiotherapy prescription was determined and was documented on the superficial radiotherapy prescription form.  A treatment calculation was also performed and documented on the treatment calculation form.  Based on the prescription, the patient was scheduled for a series of fractional treatments.
Field Size (Applicator): 4.0 cm
Computed Treatment Time In Min (Will Render The Same As Calculated Treatment Time If Left Blank): 0.38
Energy (Include Units): 100
Fractionation Number (Evaluation): 10
Functional Status: 0 (fully active)
Daily Fractionated Dose (Optional- Include Units): 272.84
Ssd In Cm (Optional): 15
Treatment Device Design After Initial Simulation Justification (Will Render If Bill For Treatment Devices = Yes): The patient is status post radiation simulation and is evaluated as to the use of additional devices for shielding and placement for radiation therapy.
Intro Statement (Will Not Render If Left Blank): The patient is undergoing superficial radiation therapy for skin cancer and presents for weekly evaluation and management.  Per protocol and as documented on the flow sheet, the patient was questioned as to subjective redness, pruritus, pain, drainage, fatigue, or any other symptoms.  Objectively, the radiation area was evaluated with regards to erythema, atrophy, scale, crusting, erosion, ulceration, edema, purpura, tenderness, warmth, drainage, and any other findings.  The plan was extensively reviewed including the dose, and dosing schedule.  The simulation and clinical setup was also reviewed as was the external and any internal shields and based on this review the appropriateness and sufficiency of treatment was determined.
Treatment Margins In Cm: 0.5
Comments: RTOG 2, on target, mupirocin BID
Depth (Optional-Please Include Units): 2.3mm
Total Number Of Fractions: 20
Fractionation Number: 14
Information: Selecting Yes will display possible errors in your note based on the variables you have selected. This validation is only offered as a suggestion for you. PLEASE NOTE THAT THE VALIDATION TEXT WILL BE REMOVED WHEN YOU FINALIZE YOUR NOTE. IF YOU WANT TO FAX A PRELIMINARY NOTE YOU WILL NEED TO TOGGLE THIS TO 'NO' IF YOU DO NOT WANT IT IN YOUR FAXED NOTE.
Time Dose Fractionation (Optional- Include Units If Applicable): 97
Custom Shielding Preamble Text Will Not Be Included With Simple Simulations (.......... X X Y Cm): A lead shield of 0.762 mm thickness is utilized to form a molded, custom shield with a
Fractions / Week Rx 2: 5
Assessment: Appropriate reaction
Shielding Size (Optional- Include Units): 3x3
Additional Prescription Justification Text: If there is any interruption in treatment exceeding 5 days please see Decay and Dose Adjustment Calculation and complete treatment under Prescription 2, 3 or 4 as appropriate.
Simple Simulation Preamble Text Will Be Included With Simple Simulations (.......... Indications): Simple simulation was performed today for the following reasons:
Cumulative Dose In Cgy (Optional): 3819.76
Custom Shielding Afterword Text Will Not Be Included With Simple Simulations (X X Y Cm............): port to correlate with the lesion size, including treatment margin. The custom lead shield is adequate to accommodate the appropriate applicator and provide adequate shielding around the treatment site. Additional shielding (as noted below) is used to protect sensitive, normal tissues.

## 2021-06-14 ENCOUNTER — APPOINTMENT (OUTPATIENT)
Age: 76
Setting detail: DERMATOLOGY
End: 2021-06-15

## 2021-06-14 PROBLEM — Z85.820 PERSONAL HISTORY OF MALIGNANT MELANOMA OF SKIN: Status: ACTIVE | Noted: 2021-06-14

## 2021-06-14 PROBLEM — C44.722 SQUAMOUS CELL CARCINOMA OF SKIN OF RIGHT LOWER LIMB, INCLUDING HIP: Status: ACTIVE | Noted: 2021-06-14

## 2021-06-14 PROCEDURE — 77427 RADIATION TX MANAGEMENT X5: CPT

## 2021-06-14 PROCEDURE — 77280 THER RAD SIMULAJ FIELD SMPL: CPT

## 2021-06-14 PROCEDURE — OTHER TREATMENT REGIMEN: OTHER

## 2021-06-14 PROCEDURE — G6001 ECHO GUIDANCE RADIOTHERAPY: HCPCS

## 2021-06-14 PROCEDURE — OTHER FOLLOW UP FOR NEXT VISIT: OTHER

## 2021-06-14 PROCEDURE — OTHER SUPERFICIAL RADIATION TREATMENT: OTHER

## 2021-06-14 PROCEDURE — 77401 RADIATION TX DELIVERY SUPFC: CPT

## 2021-06-14 NOTE — PROCEDURE: SUPERFICIAL RADIATION TREATMENT
Bill For Simulation And Treatment Device Design: No
Shielding Size (Optional- Include Units): 3x3
Include Rx 3 When Rendering Additional Prescriptions: Yes
Treatment Margins In Cm: 0.5
Fractions / Week: 3
Field Size (Applicator): 4.0 cm
Initial Radiation Treatment Planning (Will Render If Bill Simulation = Yes): The patient had a complete consultation regarding all applicable modalities for the treatment of their skin cancer and based on a variety of factors including the type of tumor, size, and location, the relevant medical history as well as local tissue factors, the functional status of the individual, the ability to perform necessary postoperative wound instructions and the need for simultaneous treatments as well as overall wound healing status, it was determined that the patient would begin radiation therapy treatment for skin cancer.  A full simulation and treatment device design was performed including the determination and formulation of appropriate simple and complex devices including lead shield of 0.762 mm thickness to form molded customized shielding to specifically correlate with the lesion size including treatment margin.  The custom lead shield is adequate to accommodate the appropriate applicator and provide adequate shielding around the treatment site.  The specific field applicator, shields, and devices both simple and complex as well as the specific patient setup is outlined below.  The patient was given a full consent for superficial radiation to both verbally and in writing and the full determination of patient's eligibility for treatment and selection is outlined on the patient eligibility and treatment selection form.  The specific superficial radiotherapy prescription was determined and was documented on the superficial radiotherapy prescription form.  A treatment calculation was also performed and documented on the treatment calculation form.  Based on the prescription, the patient was scheduled for a series of fractional treatments.
Computed Treatment Time In Min (Will Render The Same As Calculated Treatment Time If Left Blank): 0.38
Total Number Of Fractions Rx 3: 15
Fractions / Week Rx 2: 5
Energy (Include Units): 100
Number Of Days Off Treatment: 1
Functional Status: 0 (fully active)
Time Dose Fractionation (Optional- Include Units If Applicable): 97
Comments: RTOG 2, on target, mupirocin BID
Simple Simulation Preamble Text Will Be Included With Simple Simulations (.......... Indications): Simple simulation was performed today for the following reasons:
Detail Level: Detailed
Total Number Of Fractions: 20
Patient Positioning: Supine
Energy (Optional-Please Include Units): 100kV
Additional Prescription Justification Text: If there is any interruption in treatment exceeding 5 days please see Decay and Dose Adjustment Calculation and complete treatment under Prescription 2, 3 or 4 as appropriate.
Intro Statement (Will Not Render If Left Blank): The patient is undergoing superficial radiation therapy for skin cancer and presents for weekly evaluation and management.  Per protocol and as documented on the flow sheet, the patient was questioned as to subjective redness, pruritus, pain, drainage, fatigue, or any other symptoms.  Objectively, the radiation area was evaluated with regards to erythema, atrophy, scale, crusting, erosion, ulceration, edema, purpura, tenderness, warmth, drainage, and any other findings.  The plan was extensively reviewed including the dose, and dosing schedule.  The simulation and clinical setup was also reviewed as was the external and any internal shields and based on this review the appropriateness and sufficiency of treatment was determined.
Cumulative Dose In Cgy (Optional): 4092.6
Total Dose (Optional-Please Include Units): 5456.8 cGy
Dimensions-X Axis In Cm: 2
Information: Selecting Yes will display possible errors in your note based on the variables you have selected. This validation is only offered as a suggestion for you. PLEASE NOTE THAT THE VALIDATION TEXT WILL BE REMOVED WHEN YOU FINALIZE YOUR NOTE. IF YOU WANT TO FAX A PRELIMINARY NOTE YOU WILL NEED TO TOGGLE THIS TO 'NO' IF YOU DO NOT WANT IT IN YOUR FAXED NOTE.
Dose Per Fractionation In Cgy (Optional): 272.84
Custom Shielding Preamble Text Will Not Be Included With Simple Simulations (.......... X X Y Cm): A lead shield of 0.762 mm thickness is utilized to form a molded, custom shield with a
Treatment Device Design After Initial Simulation Justification (Will Render If Bill For Treatment Devices = Yes): The patient is status post radiation simulation and is evaluated as to the use of additional devices for shielding and placement for radiation therapy.
Assessment: Appropriate reaction
Fractionation Number (Evaluation): 10
Depth (Optional-Please Include Units): 2.3mm
Custom Shielding Afterword Text Will Not Be Included With Simple Simulations (X X Y Cm............): port to correlate with the lesion size, including treatment margin. The custom lead shield is adequate to accommodate the appropriate applicator and provide adequate shielding around the treatment site. Additional shielding (as noted below) is used to protect sensitive, normal tissues.

## 2021-06-14 NOTE — PROCEDURE: TREATMENT REGIMEN
Plan: This patient has been treated today with image guided superficial radiation therapy for non-melanoma skin cancer. Written informed consent has been previously obtained from this patient for this treatment. This consent is documented in the patient’s chart. The patient gave verbal consent to continue treatment today. The patient was treated with a specific radiation dose and setup as prescribed by the provider listed on this visit note. A Radiation Therapist performed administration of radiation under supervision of provider. The treatment parameters and cumulative dose are indicated above. Prior to administering the radiation, the patient underwent a verification therapeutic radiology simulation-aided field setting defining relevant normal and abnormal target anatomy and acquiring images with high frequency ultrasound in addition to data necessary developing optimal radiation treatment process for the patient. This process includes verification of the treatment port(s) and proper treatment positioning. All treatment ports were photographed within electronic medical record. The patient’s customized lead blocking along with gross tumor volume and margin was confirmed. Considering superficial radiotherapy is clinical in setup, this requires physician and radiation therapist to clarify location interest being treated against initial images, pathology and patient anatomy. Care was taken ensuring fields treated were geometrically accurate and properly positioned using therapeutic radiology simulation-aided field setting verification per fraction. This process is also utilized to determine if any prescription or setup changes are necessary. These steps are therefore medically necessary ensuring safe\\nand effective administration of radiation. Ongoing therapeutic radiology simulation-aided field setting verification is ordered throughout course of therapy.\\nA high frequency ultrasound image was acquired today for two-dimensional evaluation of the tumor volume and response to treatment, in addition to geometric accuracy of field placement. US depth Is 2.14mm, which is 0.13mm in difference from previous imaging. The field placement and ultrasound imaging, per fraction, is separate and distinct from the initial simulation, and is an important task in providing safe administration of superficial radiation therapy. Physician evaluation of the ultrasound tumor depth will be ongoing throughout the course of treatment and is deemed medically necessary in order to ensure the efficacy of treatment and any necessary changes. Today’s image was evaluated for determination of continuation of treatment with the current plan or with necessary changes as appropriate. According to provider review of verification therapeutic radiology simulation-aided field setting and imaging, no change is required. Additionally, the use of ultrasound visualization and targeted assessment allows the patient to be able to see their cancer(s) progress, encouraging patient to complete and maintain compliance through full course of radiotherapy. Per Dr. Winter, continued ultrasound guidance and therapeutic radiology simulation-aided field setting verification per fraction is required for field placement, measurement of tumor depth, progress and acute effect monitoring.\\n
Detail Level: Zone

## 2021-06-16 ENCOUNTER — APPOINTMENT (OUTPATIENT)
Age: 76
Setting detail: DERMATOLOGY
End: 2021-06-21

## 2021-06-16 PROBLEM — C44.722 SQUAMOUS CELL CARCINOMA OF SKIN OF RIGHT LOWER LIMB, INCLUDING HIP: Status: ACTIVE | Noted: 2021-06-16

## 2021-06-16 PROCEDURE — OTHER SUPERFICIAL RADIATION TREATMENT: OTHER

## 2021-06-16 PROCEDURE — G6001 ECHO GUIDANCE RADIOTHERAPY: HCPCS

## 2021-06-16 PROCEDURE — 77401 RADIATION TX DELIVERY SUPFC: CPT

## 2021-06-16 PROCEDURE — 77427 RADIATION TX MANAGEMENT X5: CPT

## 2021-06-16 PROCEDURE — OTHER TREATMENT REGIMEN: OTHER

## 2021-06-16 PROCEDURE — 77280 THER RAD SIMULAJ FIELD SMPL: CPT

## 2021-06-16 PROCEDURE — OTHER FOLLOW UP FOR NEXT VISIT: OTHER

## 2021-06-16 NOTE — PROCEDURE: TREATMENT REGIMEN
Detail Level: Zone
Plan: This patient has been treated today with image guided superficial radiation therapy for non-melanoma skin cancer. Written informed consent has been previously obtained from this patient for this treatment. This consent is documented in the patient’s chart. The patient gave verbal consent to continue treatment today. The patient was treated with a specific radiation dose and setup as prescribed by the provider listed on this visit note. A Radiation Therapist performed administration of radiation under supervision of provider. The treatment parameters and cumulative dose are indicated above. Prior to administering the radiation, the patient underwent a verification therapeutic radiology simulation-aided field setting defining relevant normal and abnormal target anatomy and acquiring images with high frequency ultrasound in addition to data necessary developing optimal radiation treatment process for the patient. This process includes verification of the treatment port(s) and proper treatment positioning. All treatment ports were photographed within electronic medical record. The patient’s customized lead blocking along with gross tumor volume and margin was confirmed. Considering superficial radiotherapy is clinical in setup, this requires physician and radiation therapist to clarify location interest being treated against initial images, pathology and patient anatomy. Care was taken ensuring fields treated were geometrically accurate and properly positioned using therapeutic radiology simulation-aided field setting verification per fraction. This process is also utilized to determine if any prescription or setup changes are necessary. These steps are therefore medically necessary ensuring safe\\nand effective administration of radiation. Ongoing therapeutic radiology simulation-aided field setting verification is ordered throughout course of therapy.\\nA high frequency ultrasound image was acquired today for two-dimensional evaluation of the tumor volume and response to treatment, in addition to geometric accuracy of field placement. US depth Is 2.49mm, which is 0.35mm in difference from previous imaging. The field placement and ultrasound imaging, per fraction, is separate and distinct from the initial simulation, and is an important task in providing safe administration of superficial radiation therapy. Physician evaluation of the ultrasound tumor depth will be ongoing throughout the course of treatment and is deemed medically necessary in order to ensure the efficacy of treatment and any necessary changes. Today’s image was evaluated for determination of continuation of treatment with the current plan or with necessary changes as appropriate. According to provider review of verification therapeutic radiology simulation-aided field setting and imaging, no change is required. Additionally, the use of ultrasound visualization and targeted assessment allows the patient to be able to see their cancer(s) progress, encouraging patient to complete and maintain compliance through full course of radiotherapy. Per Dr. Winter, continued ultrasound guidance and therapeutic radiology simulation-aided field setting verification per fraction is required for field placement, measurement of tumor depth, progress and acute effect monitoring.\\n

## 2021-06-16 NOTE — PROCEDURE: SUPERFICIAL RADIATION TREATMENT
Patient Positioning: Supine
Bill For Dosimetry/Render Decay And Dose Adjustment Calculation In Note: No
Assessment: Appropriate reaction
Fractions / Week Rx 3: 5
Dose / Tx In Cgy (Optional): 272.84
Time Dose Fractionation (Optional- Include Units If Applicable): 97
Field Size (Applicator): 4.0 cm
Initial Radiation Treatment Planning (Will Render If Bill Simulation = Yes): The patient had a complete consultation regarding all applicable modalities for the treatment of their skin cancer and based on a variety of factors including the type of tumor, size, and location, the relevant medical history as well as local tissue factors, the functional status of the individual, the ability to perform necessary postoperative wound instructions and the need for simultaneous treatments as well as overall wound healing status, it was determined that the patient would begin radiation therapy treatment for skin cancer.  A full simulation and treatment device design was performed including the determination and formulation of appropriate simple and complex devices including lead shield of 0.762 mm thickness to form molded customized shielding to specifically correlate with the lesion size including treatment margin.  The custom lead shield is adequate to accommodate the appropriate applicator and provide adequate shielding around the treatment site.  The specific field applicator, shields, and devices both simple and complex as well as the specific patient setup is outlined below.  The patient was given a full consent for superficial radiation to both verbally and in writing and the full determination of patient's eligibility for treatment and selection is outlined on the patient eligibility and treatment selection form.  The specific superficial radiotherapy prescription was determined and was documented on the superficial radiotherapy prescription form.  A treatment calculation was also performed and documented on the treatment calculation form.  Based on the prescription, the patient was scheduled for a series of fractional treatments.
Energy (Optional-Please Include Units): 100kV
Dimensions-X Axis In Cm: 2
Include Rx 3 When Rendering Additional Prescriptions: Yes
Additional Prescription Justification Text: If there is any interruption in treatment exceeding 5 days please see Decay and Dose Adjustment Calculation and complete treatment under Prescription 2, 3 or 4 as appropriate.
Comments: RTOG 2, on target, mupirocin BID
Detail Level: Detailed
Custom Shielding Preamble Text Will Not Be Included With Simple Simulations (.......... X X Y Cm): A lead shield of 0.762 mm thickness is utilized to form a molded, custom shield with a
Intro Statement (Will Not Render If Left Blank): The patient is undergoing superficial radiation therapy for skin cancer and presents for weekly evaluation and management.  Per protocol and as documented on the flow sheet, the patient was questioned as to subjective redness, pruritus, pain, drainage, fatigue, or any other symptoms.  Objectively, the radiation area was evaluated with regards to erythema, atrophy, scale, crusting, erosion, ulceration, edema, purpura, tenderness, warmth, drainage, and any other findings.  The plan was extensively reviewed including the dose, and dosing schedule.  The simulation and clinical setup was also reviewed as was the external and any internal shields and based on this review the appropriateness and sufficiency of treatment was determined.
Custom Shielding Afterword Text Will Not Be Included With Simple Simulations (X X Y Cm............): port to correlate with the lesion size, including treatment margin. The custom lead shield is adequate to accommodate the appropriate applicator and provide adequate shielding around the treatment site. Additional shielding (as noted below) is used to protect sensitive, normal tissues.
Cumulative Dose In Cgy (Optional): 4365.44
Fractions / Week: 3
Treatment Time / Fractionation (Optional- Include Units): 0.38
Information: Selecting Yes will display possible errors in your note based on the variables you have selected. This validation is only offered as a suggestion for you. PLEASE NOTE THAT THE VALIDATION TEXT WILL BE REMOVED WHEN YOU FINALIZE YOUR NOTE. IF YOU WANT TO FAX A PRELIMINARY NOTE YOU WILL NEED TO TOGGLE THIS TO 'NO' IF YOU DO NOT WANT IT IN YOUR FAXED NOTE.
Fractionation Number: 16
Energy (Include Units): 100
Functional Status: 0 (fully active)
Total Number Of Fractions Rx 3: 15
Number Of Days Off Treatment: 1
Treatment Device Design After Initial Simulation Justification (Will Render If Bill For Treatment Devices = Yes): The patient is status post radiation simulation and is evaluated as to the use of additional devices for shielding and placement for radiation therapy.
Total Dose (Optional-Please Include Units): 5456.8 cGy
Simple Simulation Preamble Text Will Be Included With Simple Simulations (.......... Indications): Simple simulation was performed today for the following reasons:
Total Number Of Fractions: 20
Depth (Optional-Please Include Units): 2.3mm
Shielding Size (Optional- Include Units): 3x3
Treatment Margins In Cm: 0.5
Fractionation Number (Evaluation): 10

## 2021-06-17 ENCOUNTER — APPOINTMENT (OUTPATIENT)
Age: 76
Setting detail: DERMATOLOGY
End: 2021-06-21

## 2021-06-17 PROBLEM — C44.722 SQUAMOUS CELL CARCINOMA OF SKIN OF RIGHT LOWER LIMB, INCLUDING HIP: Status: ACTIVE | Noted: 2021-06-17

## 2021-06-17 PROCEDURE — OTHER FOLLOW UP FOR NEXT VISIT: OTHER

## 2021-06-17 PROCEDURE — 77401 RADIATION TX DELIVERY SUPFC: CPT

## 2021-06-17 PROCEDURE — OTHER SUPERFICIAL RADIATION TREATMENT: OTHER

## 2021-06-17 PROCEDURE — G6001 ECHO GUIDANCE RADIOTHERAPY: HCPCS

## 2021-06-17 PROCEDURE — OTHER TREATMENT REGIMEN: OTHER

## 2021-06-17 PROCEDURE — 77280 THER RAD SIMULAJ FIELD SMPL: CPT

## 2021-06-17 NOTE — PROCEDURE: SUPERFICIAL RADIATION TREATMENT
Ssd In Cm (Optional): 15
Energy (Optional-Please Include Units): 100kV
Fractions / Week Rx 4: 5
Custom Shielding Preamble Text Will Not Be Included With Simple Simulations (.......... X X Y Cm): A lead shield of 0.762 mm thickness is utilized to form a molded, custom shield with a
[place]go For Simulation Without Treatment Device Design (Simple Simulation): No
Detail Level: Detailed
Time Dose Fractionation (Optional- Include Units If Applicable): 97
Additional Prescription Justification Text: If there is any interruption in treatment exceeding 5 days please see Decay and Dose Adjustment Calculation and complete treatment under Prescription 2, 3 or 4 as appropriate.
Initial Radiation Treatment Planning (Will Render If Bill Simulation = Yes): The patient had a complete consultation regarding all applicable modalities for the treatment of their skin cancer and based on a variety of factors including the type of tumor, size, and location, the relevant medical history as well as local tissue factors, the functional status of the individual, the ability to perform necessary postoperative wound instructions and the need for simultaneous treatments as well as overall wound healing status, it was determined that the patient would begin radiation therapy treatment for skin cancer.  A full simulation and treatment device design was performed including the determination and formulation of appropriate simple and complex devices including lead shield of 0.762 mm thickness to form molded customized shielding to specifically correlate with the lesion size including treatment margin.  The custom lead shield is adequate to accommodate the appropriate applicator and provide adequate shielding around the treatment site.  The specific field applicator, shields, and devices both simple and complex as well as the specific patient setup is outlined below.  The patient was given a full consent for superficial radiation to both verbally and in writing and the full determination of patient's eligibility for treatment and selection is outlined on the patient eligibility and treatment selection form.  The specific superficial radiotherapy prescription was determined and was documented on the superficial radiotherapy prescription form.  A treatment calculation was also performed and documented on the treatment calculation form.  Based on the prescription, the patient was scheduled for a series of fractional treatments.
Information: Selecting Yes will display possible errors in your note based on the variables you have selected. This validation is only offered as a suggestion for you. PLEASE NOTE THAT THE VALIDATION TEXT WILL BE REMOVED WHEN YOU FINALIZE YOUR NOTE. IF YOU WANT TO FAX A PRELIMINARY NOTE YOU WILL NEED TO TOGGLE THIS TO 'NO' IF YOU DO NOT WANT IT IN YOUR FAXED NOTE.
Assessment: Appropriate reaction
Include Rx 3 When Rendering Additional Prescriptions: Yes
Cumulative Dose In Cgy (Optional): 4638.28
Total Number Of Fractions: 20
Field Size (Applicator): 4.0 cm
Treatment Device Design After Initial Simulation Justification (Will Render If Bill For Treatment Devices = Yes): The patient is status post radiation simulation and is evaluated as to the use of additional devices for shielding and placement for radiation therapy.
Number Of Treatment Days: 1
Dimensions-Y Axis In Cm: 2
Daily Fractionated Dose (Optional- Include Units): 272.84
Fractionation Number (Evaluation): 10
Comments: RTOG 2, on target, mupirocin BID
Total Dose (Optional-Please Include Units): 5456.8 cGy
Treatment Time / Fractionation (Optional- Include Units): 0.38
Intro Statement (Will Not Render If Left Blank): The patient is undergoing superficial radiation therapy for skin cancer and presents for weekly evaluation and management.  Per protocol and as documented on the flow sheet, the patient was questioned as to subjective redness, pruritus, pain, drainage, fatigue, or any other symptoms.  Objectively, the radiation area was evaluated with regards to erythema, atrophy, scale, crusting, erosion, ulceration, edema, purpura, tenderness, warmth, drainage, and any other findings.  The plan was extensively reviewed including the dose, and dosing schedule.  The simulation and clinical setup was also reviewed as was the external and any internal shields and based on this review the appropriateness and sufficiency of treatment was determined.
Port Dimensions-X Axis In Cm: 3
Shielding Size (Optional- Include Units): 3x3
Patient Positioning: Supine
Depth (Optional-Please Include Units): 2.3mm
Custom Shielding Afterword Text Will Not Be Included With Simple Simulations (X X Y Cm............): port to correlate with the lesion size, including treatment margin. The custom lead shield is adequate to accommodate the appropriate applicator and provide adequate shielding around the treatment site. Additional shielding (as noted below) is used to protect sensitive, normal tissues.
Simple Simulation Preamble Text Will Be Included With Simple Simulations (.......... Indications): Simple simulation was performed today for the following reasons:
Functional Status: 0 (fully active)
Fractionation Number: 17
Treatment Margins In Cm: 0.5
Energy (Include Units): 100

## 2021-06-17 NOTE — PROCEDURE: TREATMENT REGIMEN
Detail Level: Zone
Plan: This patient has been treated today with image guided superficial radiation therapy for non-melanoma skin cancer. Written informed consent has been previously obtained from this patient for this treatment. This consent is documented in the patient’s chart. The patient gave verbal consent to continue treatment today. The patient was treated with a specific radiation dose and setup as prescribed by the provider listed on this visit note. A Radiation Therapist performed administration of radiation under supervision of provider. The treatment parameters and cumulative dose are indicated above. Prior to administering the radiation, the patient underwent a verification therapeutic radiology simulation-aided field setting defining relevant normal and abnormal target anatomy and acquiring images with high frequency ultrasound in addition to data necessary developing optimal radiation treatment process for the patient. This process includes verification of the treatment port(s) and proper treatment positioning. All treatment ports were photographed within electronic medical record. The patient’s customized lead blocking along with gross tumor volume and margin was confirmed. Considering superficial radiotherapy is clinical in setup, this requires physician and radiation therapist to clarify location interest being treated against initial images, pathology and patient anatomy. Care was taken ensuring fields treated were geometrically accurate and properly positioned using therapeutic radiology simulation-aided field setting verification per fraction. This process is also utilized to determine if any prescription or setup changes are necessary. These steps are therefore medically necessary ensuring safe\\nand effective administration of radiation. Ongoing therapeutic radiology simulation-aided field setting verification is ordered throughout course of therapy.\\nA high frequency ultrasound image was acquired today for two-dimensional evaluation of the tumor volume and response to treatment, in addition to geometric accuracy of field placement. US depth Is 2.27mm, which is 0.22mm in difference from previous imaging. The field placement and ultrasound imaging, per fraction, is separate and distinct from the initial simulation, and is an important task in providing safe administration of superficial radiation therapy. Physician evaluation of the ultrasound tumor depth will be ongoing throughout the course of treatment and is deemed medically necessary in order to ensure the efficacy of treatment and any necessary changes. Today’s image was evaluated for determination of continuation of treatment with the current plan or with necessary changes as appropriate. According to provider review of verification therapeutic radiology simulation-aided field setting and imaging, no change is required. Additionally, the use of ultrasound visualization and targeted assessment allows the patient to be able to see their cancer(s) progress, encouraging patient to complete and maintain compliance through full course of radiotherapy. Per Dr. Winter, continued ultrasound guidance and therapeutic radiology simulation-aided field setting verification per fraction is required for field placement, measurement of tumor depth, progress and acute effect monitoring.\\n

## 2021-06-21 ENCOUNTER — APPOINTMENT (OUTPATIENT)
Age: 76
Setting detail: DERMATOLOGY
End: 2021-06-24

## 2021-06-21 PROBLEM — C44.722 SQUAMOUS CELL CARCINOMA OF SKIN OF RIGHT LOWER LIMB, INCLUDING HIP: Status: ACTIVE | Noted: 2021-06-21

## 2021-06-21 PROCEDURE — 77401 RADIATION TX DELIVERY SUPFC: CPT

## 2021-06-21 PROCEDURE — OTHER SUPERFICIAL RADIATION TREATMENT: OTHER

## 2021-06-21 PROCEDURE — G6001 ECHO GUIDANCE RADIOTHERAPY: HCPCS

## 2021-06-21 PROCEDURE — OTHER FOLLOW UP FOR NEXT VISIT: OTHER

## 2021-06-21 PROCEDURE — 77280 THER RAD SIMULAJ FIELD SMPL: CPT

## 2021-06-21 PROCEDURE — OTHER TREATMENT REGIMEN: OTHER

## 2021-06-21 NOTE — PROCEDURE: TREATMENT REGIMEN
Plan: This patient has been treated today with image guided superficial radiation therapy for non-melanoma skin cancer. Written informed consent has been previously obtained from this patient for this treatment. This consent is documented in the patient’s chart. The patient gave verbal consent to continue treatment today. The patient was treated with a specific radiation dose and setup as prescribed by the provider listed on this visit note. A Radiation Therapist performed administration of radiation under supervision of provider. The treatment parameters and cumulative dose are indicated above. Prior to administering the radiation, the patient underwent a verification therapeutic radiology simulation-aided field setting defining relevant normal and abnormal target anatomy and acquiring images with high frequency ultrasound in addition to data necessary developing optimal radiation treatment process for the patient. This process includes verification of the treatment port(s) and proper treatment positioning. All treatment ports were photographed within electronic medical record. The patient’s customized lead blocking along with gross tumor volume and margin was confirmed. Considering superficial radiotherapy is clinical in setup, this requires physician and radiation therapist to clarify location interest being treated against initial images, pathology and patient anatomy. Care was taken ensuring fields treated were geometrically accurate and properly positioned using therapeutic radiology simulation-aided field setting verification per fraction. This process is also utilized to determine if any prescription or setup changes are necessary. These steps are therefore medically necessary ensuring safe\\nand effective administration of radiation. Ongoing therapeutic radiology simulation-aided field setting verification is ordered throughout course of therapy.\\nA high frequency ultrasound image was acquired today for two-dimensional evaluation of the tumor volume and response to treatment, in addition to geometric accuracy of field placement. US depth Is 1.89mm, which is 0.38mm in difference from previous imaging. The field placement and ultrasound imaging, per fraction, is separate and distinct from the initial simulation, and is an important task in providing safe administration of superficial radiation therapy. Physician evaluation of the ultrasound tumor depth will be ongoing throughout the course of treatment and is deemed medically necessary in order to ensure the efficacy of treatment and any necessary changes. Today’s image was evaluated for determination of continuation of treatment with the current plan or with necessary changes as appropriate. According to provider review of verification therapeutic radiology simulation-aided field setting and imaging, no change is required. Additionally, the use of ultrasound visualization and targeted assessment allows the patient to be able to see their cancer(s) progress, encouraging patient to complete and maintain compliance through full course of radiotherapy. Per Dr. Winter, continued ultrasound guidance and therapeutic radiology simulation-aided field setting verification per fraction is required for field placement, measurement of tumor depth, progress and acute effect monitoring.\\n
Detail Level: Zone

## 2021-06-21 NOTE — PROCEDURE: SUPERFICIAL RADIATION TREATMENT
Dose Per Fractionation In Cgy (Optional): 272.84
Simple Simulation Afterword Text Will Be Included With Simple Simulations (Indications............): The patient had a complete consultation regarding all applicable modalities for the treatment of their skin cancer and based on a variety of factors including the type of tumor, size, and location, the relevant medical history as well as local tissue factors, the functional status of the individual, the ability to perform necessary postoperative wound instructions and the need for simultaneous treatments as well as overall wound healing status, it was determined that the patient would begin radiation therapy treatment for skin cancer.  A full simulation and treatment device design was performed including the determination and formulation of appropriate simple and complex devices including lead shield of 0.762 mm thickness to form molded customized shielding to specifically correlate with the lesion size including treatment margin.  The custom lead shield is adequate to accommodate the appropriate applicator and provide adequate shielding around the treatment site.  The specific field applicator, shields, and devices both simple and complex as well as the specific patient setup is outlined below.  The patient was given a full consent for superficial radiation to both verbally and in writing and the full determination of patient's eligibility for treatment and selection is outlined on the patient eligibility and treatment selection form.  The specific superficial radiotherapy prescription was determined and was documented on the superficial radiotherapy prescription form.  A treatment calculation was also performed and documented on the treatment calculation form.  Based on the prescription, the patient was scheduled for a series of fractional treatments.
Fractions / Week Rx 4: 5
Detail Level: Detailed
Cumulative Dose In Cgy (Optional): 5183.96
Total Number Of Fractions Rx 2: 15
Field Number: 1
Port Dimensions-X Axis In Cm: 3
Field Size (Applicator): 4.0 cm
Bill And Render Text From Evaluation And Management Tab (Will Bill [id number]): No
Functional Status: 0 (fully active)
Custom Shielding Preamble Text Will Not Be Included With Simple Simulations (.......... X X Y Cm): A lead shield of 0.762 mm thickness is utilized to form a molded, custom shield with a
Total Dose (Optional-Please Include Units): 5456.8 cGy
Energy (Optional-Please Include Units): 100kV
Treatment Time / Fractionation (Optional- Include Units): 0.38
Bill For Radiation Treatment: Yes
Dimensions-X Axis In Cm: 2
Comments: RTOG 2, on target, mupirocin BID
Custom Shielding Afterword Text Will Not Be Included With Simple Simulations (X X Y Cm............): port to correlate with the lesion size, including treatment margin. The custom lead shield is adequate to accommodate the appropriate applicator and provide adequate shielding around the treatment site. Additional shielding (as noted below) is used to protect sensitive, normal tissues.
Depth (Optional-Please Include Units): 2.3mm
Shielding Size (Optional- Include Units): 3x3
Total Number Of Fractions: 20
Energy (Include Units): 100
Fractionation Number: 18
Fractionation Number (Evaluation): 10
Treatment Device Design After Initial Simulation Justification (Will Render If Bill For Treatment Devices = Yes): The patient is status post radiation simulation and is evaluated as to the use of additional devices for shielding and placement for radiation therapy.
Treatment Margins In Cm: 0.5
Simple Simulation Preamble Text Will Be Included With Simple Simulations (.......... Indications): Simple simulation was performed today for the following reasons:
Time Dose Fractionation (Optional- Include Units If Applicable): 97
Assessment: Appropriate reaction
Additional Prescription Justification Text: If there is any interruption in treatment exceeding 5 days please see Decay and Dose Adjustment Calculation and complete treatment under Prescription 2, 3 or 4 as appropriate.
Patient Positioning: Supine
Intro Statement (Will Not Render If Left Blank): The patient is undergoing superficial radiation therapy for skin cancer and presents for weekly evaluation and management.  Per protocol and as documented on the flow sheet, the patient was questioned as to subjective redness, pruritus, pain, drainage, fatigue, or any other symptoms.  Objectively, the radiation area was evaluated with regards to erythema, atrophy, scale, crusting, erosion, ulceration, edema, purpura, tenderness, warmth, drainage, and any other findings.  The plan was extensively reviewed including the dose, and dosing schedule.  The simulation and clinical setup was also reviewed as was the external and any internal shields and based on this review the appropriateness and sufficiency of treatment was determined.
Information: Selecting Yes will display possible errors in your note based on the variables you have selected. This validation is only offered as a suggestion for you. PLEASE NOTE THAT THE VALIDATION TEXT WILL BE REMOVED WHEN YOU FINALIZE YOUR NOTE. IF YOU WANT TO FAX A PRELIMINARY NOTE YOU WILL NEED TO TOGGLE THIS TO 'NO' IF YOU DO NOT WANT IT IN YOUR FAXED NOTE.

## 2021-06-23 ENCOUNTER — APPOINTMENT (OUTPATIENT)
Age: 76
Setting detail: DERMATOLOGY
End: 2021-06-26

## 2021-06-23 PROBLEM — E78.5 HYPERLIPIDEMIA, UNSPECIFIED: Status: ACTIVE | Noted: 2021-06-23

## 2021-06-23 PROBLEM — C44.722 SQUAMOUS CELL CARCINOMA OF SKIN OF RIGHT LOWER LIMB, INCLUDING HIP: Status: ACTIVE | Noted: 2021-06-23

## 2021-06-23 PROCEDURE — OTHER SUPERFICIAL RADIATION TREATMENT: OTHER

## 2021-06-23 PROCEDURE — OTHER TREATMENT REGIMEN: OTHER

## 2021-06-23 PROCEDURE — 77401 RADIATION TX DELIVERY SUPFC: CPT

## 2021-06-23 PROCEDURE — G6001 ECHO GUIDANCE RADIOTHERAPY: HCPCS

## 2021-06-23 PROCEDURE — OTHER FOLLOW UP FOR NEXT VISIT: OTHER

## 2021-06-23 PROCEDURE — 77280 THER RAD SIMULAJ FIELD SMPL: CPT

## 2021-06-23 NOTE — PROCEDURE: TREATMENT REGIMEN
Detail Level: Zone
Plan: This patient has been treated today with image guided superficial radiation therapy for non-melanoma skin cancer. Written informed consent has been previously obtained from this patient for this treatment. This consent is documented in the patient’s chart. The patient gave verbal consent to continue treatment today. The patient was treated with a specific radiation dose and setup as prescribed by the provider listed on this visit note. A Radiation Therapist performed administration of radiation under supervision of provider. The treatment parameters and cumulative dose are indicated above. Prior to administering the radiation, the patient underwent a verification therapeutic radiology simulation-aided field setting defining relevant normal and abnormal target anatomy and acquiring images with high frequency ultrasound in addition to data necessary developing optimal radiation treatment process for the patient. This process includes verification of the treatment port(s) and proper treatment positioning. All treatment ports were photographed within electronic medical record. The patient’s customized lead blocking along with gross tumor volume and margin was confirmed. Considering superficial radiotherapy is clinical in setup, this requires physician and radiation therapist to clarify location interest being treated against initial images, pathology and patient anatomy. Care was taken ensuring fields treated were geometrically accurate and properly positioned using therapeutic radiology simulation-aided field setting verification per fraction. This process is also utilized to determine if any prescription or setup changes are necessary. These steps are therefore medically necessary ensuring safe\\nand effective administration of radiation. Ongoing therapeutic radiology simulation-aided field setting verification is ordered throughout course of therapy.\\nA high frequency ultrasound image was acquired today for two-dimensional evaluation of the tumor volume and response to treatment, in addition to geometric accuracy of field placement. US depth Is 1.79mm, which is 0.10mm in difference from previous imaging. The field placement and ultrasound imaging, per fraction, is separate and distinct from the initial simulation, and is an important task in providing safe administration of superficial radiation therapy. Physician evaluation of the ultrasound tumor depth will be ongoing throughout the course of treatment and is deemed medically necessary in order to ensure the efficacy of treatment and any necessary changes. Today’s image was evaluated for determination of continuation of treatment with the current plan or with necessary changes as appropriate. According to provider review of verification therapeutic radiology simulation-aided field setting and imaging, no change is required. Additionally, the use of ultrasound visualization and targeted assessment allows the patient to be able to see their cancer(s) progress, encouraging patient to complete and maintain compliance through full course of radiotherapy. Per Dr. Winter, continued ultrasound guidance and therapeutic radiology simulation-aided field setting verification per fraction is required for field placement, measurement of tumor depth, progress and acute effect monitoring.\\n

## 2021-06-23 NOTE — PROCEDURE: SUPERFICIAL RADIATION TREATMENT
Fractionation Number: 19
Total Dose (Optional-Please Include Units): 5456.8 cGy
Field Size (Applicator): 4.0 cm
Additional Prescription Justification Text: If there is any interruption in treatment exceeding 5 days please see Decay and Dose Adjustment Calculation and complete treatment under Prescription 2, 3 or 4 as appropriate.
Dimensions-X Axis In Cm: 2
Shielding Size (Optional- Include Units): 3x3
Validate Note Data (See Information Below): Yes
Depth (Optional-Please Include Units): 2.3mm
Fractions / Week Rx 4: 5
Energy (Optional-Please Include Units): 100kV
Number Of Treatment Days: 1
Bill For Treatment Devices Only: No
Initial Radiation Treatment Planning (Will Render If Bill Simulation = Yes): The patient had a complete consultation regarding all applicable modalities for the treatment of their skin cancer and based on a variety of factors including the type of tumor, size, and location, the relevant medical history as well as local tissue factors, the functional status of the individual, the ability to perform necessary postoperative wound instructions and the need for simultaneous treatments as well as overall wound healing status, it was determined that the patient would begin radiation therapy treatment for skin cancer.  A full simulation and treatment device design was performed including the determination and formulation of appropriate simple and complex devices including lead shield of 0.762 mm thickness to form molded customized shielding to specifically correlate with the lesion size including treatment margin.  The custom lead shield is adequate to accommodate the appropriate applicator and provide adequate shielding around the treatment site.  The specific field applicator, shields, and devices both simple and complex as well as the specific patient setup is outlined below.  The patient was given a full consent for superficial radiation to both verbally and in writing and the full determination of patient's eligibility for treatment and selection is outlined on the patient eligibility and treatment selection form.  The specific superficial radiotherapy prescription was determined and was documented on the superficial radiotherapy prescription form.  A treatment calculation was also performed and documented on the treatment calculation form.  Based on the prescription, the patient was scheduled for a series of fractional treatments.
Total Number Of Fractions Rx 4: 15
Intro Statement (Will Not Render If Left Blank): The patient is undergoing superficial radiation therapy for skin cancer and presents for weekly evaluation and management.  Per protocol and as documented on the flow sheet, the patient was questioned as to subjective redness, pruritus, pain, drainage, fatigue, or any other symptoms.  Objectively, the radiation area was evaluated with regards to erythema, atrophy, scale, crusting, erosion, ulceration, edema, purpura, tenderness, warmth, drainage, and any other findings.  The plan was extensively reviewed including the dose, and dosing schedule.  The simulation and clinical setup was also reviewed as was the external and any internal shields and based on this review the appropriateness and sufficiency of treatment was determined.
Treatment Time / Fractionation (Optional- Include Units): 0.38
Functional Status: 0 (fully active)
Simple Simulation Preamble Text Will Be Included With Simple Simulations (.......... Indications): Simple simulation was performed today for the following reasons:
Dose / Tx In Cgy (Optional): 272.84
Treatment Margins In Cm: 0.5
Custom Shielding Afterword Text Will Not Be Included With Simple Simulations (X X Y Cm............): port to correlate with the lesion size, including treatment margin. The custom lead shield is adequate to accommodate the appropriate applicator and provide adequate shielding around the treatment site. Additional shielding (as noted below) is used to protect sensitive, normal tissues.
Fractions / Week: 3
Time Dose Fractionation (Optional- Include Units If Applicable): 97
Cumulative Dose In Cgy (Optional): 5183.96
Information: Selecting Yes will display possible errors in your note based on the variables you have selected. This validation is only offered as a suggestion for you. PLEASE NOTE THAT THE VALIDATION TEXT WILL BE REMOVED WHEN YOU FINALIZE YOUR NOTE. IF YOU WANT TO FAX A PRELIMINARY NOTE YOU WILL NEED TO TOGGLE THIS TO 'NO' IF YOU DO NOT WANT IT IN YOUR FAXED NOTE.
Custom Shielding Preamble Text Will Not Be Included With Simple Simulations (.......... X X Y Cm): A lead shield of 0.762 mm thickness is utilized to form a molded, custom shield with a
Energy (Include Units): 100
Comments: RTOG 2, on target, mupirocin BID
Assessment: Appropriate reaction
Detail Level: Detailed
Treatment Device Design After Initial Simulation Justification (Will Render If Bill For Treatment Devices = Yes): The patient is status post radiation simulation and is evaluated as to the use of additional devices for shielding and placement for radiation therapy.
Total Number Of Fractions: 20
Fractionation Number (Evaluation): 10
Patient Positioning: Supine

## 2021-06-24 ENCOUNTER — APPOINTMENT (OUTPATIENT)
Age: 76
Setting detail: DERMATOLOGY
End: 2021-06-28

## 2021-06-24 PROBLEM — C44.722 SQUAMOUS CELL CARCINOMA OF SKIN OF RIGHT LOWER LIMB, INCLUDING HIP: Status: ACTIVE | Noted: 2021-06-24

## 2021-06-24 PROCEDURE — 77280 THER RAD SIMULAJ FIELD SMPL: CPT

## 2021-06-24 PROCEDURE — 77401 RADIATION TX DELIVERY SUPFC: CPT

## 2021-06-24 PROCEDURE — G6001 ECHO GUIDANCE RADIOTHERAPY: HCPCS

## 2021-06-24 PROCEDURE — OTHER TREATMENT REGIMEN: OTHER

## 2021-06-24 PROCEDURE — OTHER FOLLOW UP FOR NEXT VISIT: OTHER

## 2021-06-24 PROCEDURE — OTHER SUPERFICIAL RADIATION TREATMENT: OTHER

## 2021-06-24 NOTE — PROCEDURE: TREATMENT REGIMEN
Plan: This patient has been treated today with image guided superficial radiation therapy for non-melanoma skin cancer. Written informed consent has been previously obtained from this patient for this treatment. This consent is documented in the patient’s chart. The patient gave verbal consent to continue treatment today. The patient was treated with a specific radiation dose and setup as prescribed by the provider listed on this visit note. A Radiation Therapist performed administration of radiation under supervision of provider. The treatment parameters and cumulative dose are indicated above. Prior to administering the radiation, the patient underwent a verification therapeutic radiology simulation-aided field setting defining relevant normal and abnormal target anatomy and acquiring images with high frequency ultrasound in addition to data necessary developing optimal radiation treatment process for the patient. This process includes verification of the treatment port(s) and proper treatment positioning. All treatment ports were photographed within electronic medical record. The patient’s customized lead blocking along with gross tumor volume and margin was confirmed. Considering superficial radiotherapy is clinical in setup, this requires physician and radiation therapist to clarify location interest being treated against initial images, pathology and patient anatomy. Care was taken ensuring fields treated were geometrically accurate and properly positioned using therapeutic radiology simulation-aided field setting verification per fraction. This process is also utilized to determine if any prescription or setup changes are necessary. These steps are therefore medically necessary ensuring safe\\nand effective administration of radiation. Ongoing therapeutic radiology simulation-aided field setting verification is ordered throughout course of therapy.\\nA high frequency ultrasound image was acquired today for two-dimensional evaluation of the tumor volume and response to treatment, in addition to geometric accuracy of field placement. US depth Is 2.19mm, which is 0.40mm in difference from previous imaging. The field placement and ultrasound imaging, per fraction, is separate and distinct from the initial simulation, and is an important task in providing safe administration of superficial radiation therapy. Physician evaluation of the ultrasound tumor depth will be ongoing throughout the course of treatment and is deemed medically necessary in order to ensure the efficacy of treatment and any necessary changes. Today’s image was evaluated for determination of continuation of treatment with the current plan or with necessary changes as appropriate. According to provider review of verification therapeutic radiology simulation-aided field setting and imaging, no change is required. Additionally, the use of ultrasound visualization and targeted assessment allows the patient to be able to see their cancer(s) progress, encouraging patient to complete and maintain compliance through full course of radiotherapy. Per Dr. Winter, continued ultrasound guidance and therapeutic radiology simulation-aided field setting verification per fraction is required for field placement, measurement of tumor depth, progress and acute effect monitoring.\\n
Detail Level: Zone

## 2021-06-24 NOTE — PROCEDURE: SUPERFICIAL RADIATION TREATMENT
Bill For Dosimetry/Render Treatment Time Calculation In Note: No
Dose / Tx In Cgy (Optional): 272.84
Total Number Of Fractions: 20
Include Rx 3 When Rendering Additional Prescriptions: Yes
Total Number Of Fractions Rx 2: 15
Patient Positioning: Supine
Energy (Include Units): 100
Fractions / Week: 3
Initial Radiation Treatment Planning (Will Render If Bill Simulation = Yes): The patient had a complete consultation regarding all applicable modalities for the treatment of their skin cancer and based on a variety of factors including the type of tumor, size, and location, the relevant medical history as well as local tissue factors, the functional status of the individual, the ability to perform necessary postoperative wound instructions and the need for simultaneous treatments as well as overall wound healing status, it was determined that the patient would begin radiation therapy treatment for skin cancer.  A full simulation and treatment device design was performed including the determination and formulation of appropriate simple and complex devices including lead shield of 0.762 mm thickness to form molded customized shielding to specifically correlate with the lesion size including treatment margin.  The custom lead shield is adequate to accommodate the appropriate applicator and provide adequate shielding around the treatment site.  The specific field applicator, shields, and devices both simple and complex as well as the specific patient setup is outlined below.  The patient was given a full consent for superficial radiation to both verbally and in writing and the full determination of patient's eligibility for treatment and selection is outlined on the patient eligibility and treatment selection form.  The specific superficial radiotherapy prescription was determined and was documented on the superficial radiotherapy prescription form.  A treatment calculation was also performed and documented on the treatment calculation form.  Based on the prescription, the patient was scheduled for a series of fractional treatments.
Prescription Used: 1
Fractions / Week Rx 3: 5
Simple Simulation Preamble Text Will Be Included With Simple Simulations (.......... Indications): Simple simulation was performed today for the following reasons:
Comments: RTOG 1, on target, follow up in 6 weeks
Time Dose Fractionation (Optional- Include Units If Applicable): 97
Custom Shielding Preamble Text Will Not Be Included With Simple Simulations (.......... X X Y Cm): A lead shield of 0.762 mm thickness is utilized to form a molded, custom shield with a
Treatment Device Design After Initial Simulation Justification (Will Render If Bill For Treatment Devices = Yes): The patient is status post radiation simulation and is evaluated as to the use of additional devices for shielding and placement for radiation therapy.
Detail Level: Detailed
Energy (Optional-Please Include Units): 100kV
Intro Statement (Will Not Render If Left Blank): The patient is undergoing superficial radiation therapy for skin cancer and presents for weekly evaluation and management.  Per protocol and as documented on the flow sheet, the patient was questioned as to subjective redness, pruritus, pain, drainage, fatigue, or any other symptoms.  Objectively, the radiation area was evaluated with regards to erythema, atrophy, scale, crusting, erosion, ulceration, edema, purpura, tenderness, warmth, drainage, and any other findings.  The plan was extensively reviewed including the dose, and dosing schedule.  The simulation and clinical setup was also reviewed as was the external and any internal shields and based on this review the appropriateness and sufficiency of treatment was determined.
Additional Prescription Justification Text: If there is any interruption in treatment exceeding 5 days please see Decay and Dose Adjustment Calculation and complete treatment under Prescription 2, 3 or 4 as appropriate.
Shielding Size (Optional- Include Units): 3x3
Treatment Time In Min (Optional): 0.38
Information: Selecting Yes will display possible errors in your note based on the variables you have selected. This validation is only offered as a suggestion for you. PLEASE NOTE THAT THE VALIDATION TEXT WILL BE REMOVED WHEN YOU FINALIZE YOUR NOTE. IF YOU WANT TO FAX A PRELIMINARY NOTE YOU WILL NEED TO TOGGLE THIS TO 'NO' IF YOU DO NOT WANT IT IN YOUR FAXED NOTE.
Field Size (Applicator): 4.0 cm
Functional Status: 0 (fully active)
Dimensions-Y Axis In Cm: 2
Total Dose (Optional-Please Include Units): 5456.8 cGy
Cumulative Dose In Cgy (Optional): 5456.8
Assessment: Appropriate reaction
Treatment Margins In Cm: 0.5
Depth (Optional-Please Include Units): 2.3mm
Custom Shielding Afterword Text Will Not Be Included With Simple Simulations (X X Y Cm............): port to correlate with the lesion size, including treatment margin. The custom lead shield is adequate to accommodate the appropriate applicator and provide adequate shielding around the treatment site. Additional shielding (as noted below) is used to protect sensitive, normal tissues.

## 2021-06-26 ENCOUNTER — APPOINTMENT (OUTPATIENT)
Age: 76
Setting detail: DERMATOLOGY
End: 2021-07-09

## 2021-06-26 PROBLEM — C44.722 SQUAMOUS CELL CARCINOMA OF SKIN OF RIGHT LOWER LIMB, INCLUDING HIP: Status: ACTIVE | Noted: 2021-06-26

## 2021-06-26 PROCEDURE — 77336 RADIATION PHYSICS CONSULT: CPT

## 2021-06-26 PROCEDURE — OTHER SUPERFICIAL RADIATION TREATMENT: OTHER

## 2021-06-26 NOTE — PROCEDURE: SUPERFICIAL RADIATION TREATMENT
Total Number Of Fractions Rx 4: 15
Cumulative Dose In Cgy (Optional): 5456.8
Patient Positioning: Supine
Bill For Treatment Devices Only: No
Depth (Optional-Please Include Units): 2.3mm
Functional Status: 0 (fully active)
Number Of Treatment Days: 1
Intro Statement (Will Not Render If Left Blank): The patient is undergoing superficial radiation therapy for skin cancer and presents for weekly evaluation and management.  Per protocol and as documented on the flow sheet, the patient was questioned as to subjective redness, pruritus, pain, drainage, fatigue, or any other symptoms.  Objectively, the radiation area was evaluated with regards to erythema, atrophy, scale, crusting, erosion, ulceration, edema, purpura, tenderness, warmth, drainage, and any other findings.  The plan was extensively reviewed including the dose, and dosing schedule.  The simulation and clinical setup was also reviewed as was the external and any internal shields and based on this review the appropriateness and sufficiency of treatment was determined.
Custom Shielding Afterword Text Will Not Be Included With Simple Simulations (X X Y Cm............): port to correlate with the lesion size, including treatment margin. The custom lead shield is adequate to accommodate the appropriate applicator and provide adequate shielding around the treatment site. Additional shielding (as noted below) is used to protect sensitive, normal tissues.
Bill For Radiation Treatment: Yes
Simple Simulation Afterword Text Will Be Included With Simple Simulations (Indications............): The patient had a complete consultation regarding all applicable modalities for the treatment of their skin cancer and based on a variety of factors including the type of tumor, size, and location, the relevant medical history as well as local tissue factors, the functional status of the individual, the ability to perform necessary postoperative wound instructions and the need for simultaneous treatments as well as overall wound healing status, it was determined that the patient would begin radiation therapy treatment for skin cancer.  A full simulation and treatment device design was performed including the determination and formulation of appropriate simple and complex devices including lead shield of 0.762 mm thickness to form molded customized shielding to specifically correlate with the lesion size including treatment margin.  The custom lead shield is adequate to accommodate the appropriate applicator and provide adequate shielding around the treatment site.  The specific field applicator, shields, and devices both simple and complex as well as the specific patient setup is outlined below.  The patient was given a full consent for superficial radiation to both verbally and in writing and the full determination of patient's eligibility for treatment and selection is outlined on the patient eligibility and treatment selection form.  The specific superficial radiotherapy prescription was determined and was documented on the superficial radiotherapy prescription form.  A treatment calculation was also performed and documented on the treatment calculation form.  Based on the prescription, the patient was scheduled for a series of fractional treatments.
Dose Per Fractionation In Cgy (Optional): 272.84
Field Size (Applicator): 4.0 cm
Assessment: Appropriate reaction
Treatment Device Design After Initial Simulation Justification (Will Render If Bill For Treatment Devices = Yes): The patient is status post radiation simulation and is evaluated as to the use of additional devices for shielding and placement for radiation therapy.
Port Dimensions-X Axis In Cm: 3
Dimensions-X Axis In Cm: 2
Information: Selecting Yes will display possible errors in your note based on the variables you have selected. This validation is only offered as a suggestion for you. PLEASE NOTE THAT THE VALIDATION TEXT WILL BE REMOVED WHEN YOU FINALIZE YOUR NOTE. IF YOU WANT TO FAX A PRELIMINARY NOTE YOU WILL NEED TO TOGGLE THIS TO 'NO' IF YOU DO NOT WANT IT IN YOUR FAXED NOTE.
Total Dose (Optional-Please Include Units): 5456.8 cGy
Fractions / Week Rx 2: 5
Computed Treatment Time In Min (Will Render The Same As Calculated Treatment Time If Left Blank): 0.38
Custom Shielding Preamble Text Will Not Be Included With Simple Simulations (.......... X X Y Cm): A lead shield of 0.762 mm thickness is utilized to form a molded, custom shield with a
Fractionation Number (Evaluation): 20
Shielding Size (Optional- Include Units): 3x3
Detail Level: Detailed
Energy (Include Units): 100
Treatment Margins In Cm: 0.5
Time Dose Fractionation (Optional- Include Units If Applicable): 97
Comments: RTOG 1, on target, follow up in 6 weeks
Energy (Optional-Please Include Units): 100kV
Additional Prescription Justification Text: If there is any interruption in treatment exceeding 5 days please see Decay and Dose Adjustment Calculation and complete treatment under Prescription 2, 3 or 4 as appropriate.
Simple Simulation Preamble Text Will Be Included With Simple Simulations (.......... Indications): Simple simulation was performed today for the following reasons:

## 2021-07-28 ENCOUNTER — APPOINTMENT (OUTPATIENT)
Age: 76
Setting detail: DERMATOLOGY
End: 2021-07-28

## 2021-07-28 PROBLEM — C44.722 SQUAMOUS CELL CARCINOMA OF SKIN OF RIGHT LOWER LIMB, INCLUDING HIP: Status: ACTIVE | Noted: 2021-07-28

## 2021-07-28 PROCEDURE — 77427 RADIATION TX MANAGEMENT X5: CPT

## 2021-07-28 PROCEDURE — OTHER SUPERFICIAL RADIATION TREATMENT: OTHER

## 2021-07-28 PROCEDURE — OTHER TREATMENT REGIMEN: OTHER

## 2021-07-28 NOTE — PROCEDURE: SUPERFICIAL RADIATION TREATMENT
Total Number Of Fractions Rx 4: 15
Time Dose Fractionation (Optional- Include Units If Applicable): 97
Fractions / Week Rx 4: 5
Render Prescriptions In Note?: No
Field Size (Applicator): 4.0 cm
Include Rx 4 When Rendering Additional Prescriptions: Yes
Energy (Include Units): 100
Dimensions-X Axis In Cm: 2
Port Dimensions-X Axis In Cm: 3
Custom Shielding Afterword Text Will Not Be Included With Simple Simulations (X X Y Cm............): port to correlate with the lesion size, including treatment margin. The custom lead shield is adequate to accommodate the appropriate applicator and provide adequate shielding around the treatment site. Additional shielding (as noted below) is used to protect sensitive, normal tissues.
Number Of Treatment Days: 1
Dose / Tx In Cgy (Optional): 272.84
Treatment Time In Min (Optional): 0.38
Assessment: Appropriate reaction
Fractionation Number: 20
Depth (Optional-Please Include Units): 2.3mm
Initial Radiation Treatment Planning (Will Render If Bill Simulation = Yes): The patient had a complete consultation regarding all applicable modalities for the treatment of their skin cancer and based on a variety of factors including the type of tumor, size, and location, the relevant medical history as well as local tissue factors, the functional status of the individual, the ability to perform necessary postoperative wound instructions and the need for simultaneous treatments as well as overall wound healing status, it was determined that the patient would begin radiation therapy treatment for skin cancer.  A full simulation and treatment device design was performed including the determination and formulation of appropriate simple and complex devices including lead shield of 0.762 mm thickness to form molded customized shielding to specifically correlate with the lesion size including treatment margin.  The custom lead shield is adequate to accommodate the appropriate applicator and provide adequate shielding around the treatment site.  The specific field applicator, shields, and devices both simple and complex as well as the specific patient setup is outlined below.  The patient was given a full consent for superficial radiation to both verbally and in writing and the full determination of patient's eligibility for treatment and selection is outlined on the patient eligibility and treatment selection form.  The specific superficial radiotherapy prescription was determined and was documented on the superficial radiotherapy prescription form.  A treatment calculation was also performed and documented on the treatment calculation form.  Based on the prescription, the patient was scheduled for a series of fractional treatments.
Cumulative Dose In Cgy (Optional): 5456.8
Total Dose (Optional-Please Include Units): 5456.8 cGy
Comments: no evidence of disease, allow more time for healing and follow up with routine skin exams
Information: Selecting Yes will display possible errors in your note based on the variables you have selected. This validation is only offered as a suggestion for you. PLEASE NOTE THAT THE VALIDATION TEXT WILL BE REMOVED WHEN YOU FINALIZE YOUR NOTE. IF YOU WANT TO FAX A PRELIMINARY NOTE YOU WILL NEED TO TOGGLE THIS TO 'NO' IF YOU DO NOT WANT IT IN YOUR FAXED NOTE.
Please Choose The Type Of Visit (Required): Weekly Evaluation Visit: Show Weekly Evaluation Variables
Custom Shielding Preamble Text Will Not Be Included With Simple Simulations (.......... X X Y Cm): A lead shield of 0.762 mm thickness is utilized to form a molded, custom shield with a
Treatment Device Design After Initial Simulation Justification (Will Render If Bill For Treatment Devices = Yes): The patient is status post radiation simulation and is evaluated as to the use of additional devices for shielding and placement for radiation therapy.
Detail Level: Detailed
Shielding Size (Optional- Include Units): 3x3
Energy (Optional-Please Include Units): 100kV
Additional Prescription Justification Text: If there is any interruption in treatment exceeding 5 days please see Decay and Dose Adjustment Calculation and complete treatment under Prescription 2, 3 or 4 as appropriate.
Functional Status: 0 (fully active)
Treatment Margins In Cm: 0.5
Intro Statement (Will Not Render If Left Blank): The patient is undergoing superficial radiation therapy for skin cancer and presents for weekly evaluation and management.  Per protocol and as documented on the flow sheet, the patient was questioned as to subjective redness, pruritus, pain, drainage, fatigue, or any other symptoms.  Objectively, the radiation area was evaluated with regards to erythema, atrophy, scale, crusting, erosion, ulceration, edema, purpura, tenderness, warmth, drainage, and any other findings.  The plan was extensively reviewed including the dose, and dosing schedule.  The simulation and clinical setup was also reviewed as was the external and any internal shields and based on this review the appropriateness and sufficiency of treatment was determined.
Patient Positioning: Supine
Simple Simulation Preamble Text Will Be Included With Simple Simulations (.......... Indications): Simple simulation was performed today for the following reasons:

## 2021-07-28 NOTE — PROCEDURE: TREATMENT REGIMEN
Plan: Per the request of Dr. Winter, patient was seen today for a 6 week follow up for Superficial Radiation Therapy (CPT® 50977 vs 9921X). Physician evaluation of the ultrasound tumor depth was ongoing through course of treatment and is deemed medically necessary ensuring efficacy of treatment. All appropriate custom blocking and treatment parameters verified by radiation therapist according to initial simulation. A high frequency ultrasound image was acquired today for three-dimensional evaluation of tumor volume and response to treatment, in addition, geometric accuracy of field placement (CPT® ). Today’s image was evaluated, lesion not detected on US. Objectively, the treated radiation area was evaluated with regards to erythema, atrophy, scale, crusting, erosion, ulceration, edema, purpura, tenderness, warmth, drainage, and any other findings. Patient to follow up for routine visits.
Detail Level: Zone

## 2021-08-31 ENCOUNTER — APPOINTMENT (OUTPATIENT)
Age: 76
Setting detail: DERMATOLOGY
End: 2021-09-01

## 2021-08-31 VITALS — TEMPERATURE: 98.7 F

## 2021-08-31 DIAGNOSIS — L58.9 RADIODERMATITIS, UNSPECIFIED: ICD-10-CM

## 2021-08-31 DIAGNOSIS — Z85.828 PERSONAL HISTORY OF OTHER MALIGNANT NEOPLASM OF SKIN: ICD-10-CM

## 2021-08-31 PROCEDURE — OTHER RETURN TO REFERRING PROVIDER: OTHER

## 2021-08-31 PROCEDURE — OTHER COUNSELING: OTHER

## 2021-08-31 PROCEDURE — OTHER PRESCRIPTION: OTHER

## 2021-08-31 PROCEDURE — OTHER TREATMENT REGIMEN: OTHER

## 2021-08-31 PROCEDURE — OTHER MIPS QUALITY: OTHER

## 2021-08-31 PROCEDURE — 99213 OFFICE O/P EST LOW 20 MIN: CPT

## 2021-08-31 RX ORDER — DOXYCYCLINE HYCLATE 100 MG/1
CAPSULE, GELATIN COATED ORAL
Qty: 14 | Refills: 0 | Status: ERX | COMMUNITY
Start: 2021-08-31

## 2021-08-31 ASSESSMENT — LOCATION SIMPLE DESCRIPTION DERM: LOCATION SIMPLE: RIGHT PRETIBIAL REGION

## 2021-08-31 ASSESSMENT — LOCATION DETAILED DESCRIPTION DERM: LOCATION DETAILED: RIGHT DISTAL PRETIBIAL REGION

## 2021-08-31 ASSESSMENT — LOCATION ZONE DERM: LOCATION ZONE: LEG

## 2021-08-31 NOTE — PROCEDURE: TREATMENT REGIMEN
Plan: Advised patient to elevate both legs to help reduce inflammation
Detail Level: Zone
Initiate Treatment: Mupirocin ointment bid x 2 weeks to affected area on lower right leg -  patient
Otc Regimen: Compression stockings to help reduce fluid in lower right leg

## 2021-09-21 ENCOUNTER — APPOINTMENT (OUTPATIENT)
Age: 76
Setting detail: DERMATOLOGY
End: 2021-09-22

## 2021-09-21 VITALS — TEMPERATURE: 98.7 F

## 2021-09-21 DIAGNOSIS — Z85.828 PERSONAL HISTORY OF OTHER MALIGNANT NEOPLASM OF SKIN: ICD-10-CM

## 2021-09-21 DIAGNOSIS — L58.9 RADIODERMATITIS, UNSPECIFIED: ICD-10-CM

## 2021-09-21 PROCEDURE — OTHER PRESCRIPTION MEDICATION MANAGEMENT: OTHER

## 2021-09-21 PROCEDURE — OTHER COUNSELING: OTHER

## 2021-09-21 PROCEDURE — OTHER MIPS QUALITY: OTHER

## 2021-09-21 PROCEDURE — 99213 OFFICE O/P EST LOW 20 MIN: CPT

## 2021-09-21 PROCEDURE — OTHER TREATMENT REGIMEN: OTHER

## 2021-09-21 PROCEDURE — OTHER RETURN TO REFERRING PROVIDER: OTHER

## 2021-09-21 ASSESSMENT — LOCATION SIMPLE DESCRIPTION DERM: LOCATION SIMPLE: RIGHT PRETIBIAL REGION

## 2021-09-21 ASSESSMENT — LOCATION DETAILED DESCRIPTION DERM: LOCATION DETAILED: RIGHT DISTAL PRETIBIAL REGION

## 2021-09-21 ASSESSMENT — LOCATION ZONE DERM: LOCATION ZONE: LEG

## 2021-09-21 NOTE — PROCEDURE: PRESCRIPTION MEDICATION MANAGEMENT
Render In Strict Bullet Format?: No
Samples Given: Non stick telfa dressing
Plan: Dry skin care handout given
Detail Level: Zone
Initiate Treatment: Vanicream cream as moisturizer.
Continue Regimen: Mupirocin ointment bid to scabbed area only\\n\\nCompression stockings \\n\\nElevation of legs

## 2021-09-21 NOTE — PROCEDURE: TREATMENT REGIMEN
Detail Level: Zone
Continue Regimen: Mupirocin ointment bid x 2 weeks to affected area on lower right when scabbed leg only  -  patient\\n\\nCompression stockings to help reduce fluid in lower right leg\\n\\nElevation for both legs daily
Initiate Treatment: Vanicream lotion daily as moisturizer
Samples Given: Non stick telfa dressing - to help prevent adhesive reaction
Plan: Dry skin care handout given
Otc Regimen: Vanicream lite lotion

## 2023-01-11 ENCOUNTER — APPOINTMENT (RX ONLY)
Dept: URBAN - METROPOLITAN AREA CLINIC 125 | Facility: CLINIC | Age: 78
Setting detail: DERMATOLOGY
End: 2023-01-11

## 2023-01-11 VITALS
WEIGHT: 117 LBS | OXYGEN SATURATION: 98 % | HEIGHT: 64 IN | TEMPERATURE: 96 F | DIASTOLIC BLOOD PRESSURE: 82 MMHG | HEART RATE: 67 BPM | SYSTOLIC BLOOD PRESSURE: 139 MMHG

## 2023-01-11 PROBLEM — C44.722 SQUAMOUS CELL CARCINOMA OF SKIN OF RIGHT LOWER LIMB, INCLUDING HIP: Status: ACTIVE | Noted: 2023-01-11

## 2023-01-11 PROCEDURE — 17313 MOHS 1 STAGE T/A/L: CPT

## 2023-01-11 PROCEDURE — ? MOHS SURGERY

## 2023-01-11 PROCEDURE — 13121 CMPLX RPR S/A/L 2.6-7.5 CM: CPT

## 2023-01-11 NOTE — PROCEDURE: MOHS SURGERY
Body Location Override (Optional - Billing Will Still Be Based On Selected Body Map Location If Applicable): right shin
Mohs Case Number: 
Date Of Previous Biopsy (Optional): 12/21/2022
Previous Accession (Optional): [id number]
Biopsy Photograph Reviewed: Yes
Referring Physician (Optional): Dr. Medrano
Consent Type: Consent 1 (Standard)
Eye Shield Used: No
Surgeon Performing Repair (Optional): Pastora Mckeon MD
Initial Size Of Lesion: 1.7
X Size Of Lesion In Cm (Optional): 1
Primary Defect Length In Cm (Final Defect Size - Required For Flaps/Grafts): 1.8
Primary Defect Width In Cm (Final Defect Size - Required For Flaps/Grafts): 1.2
Repair Type: Complex Repair
Oculoplastic Surgeon Procedure Text (A): After obtaining clear surgical margins the patient was sent to oculoplastics for surgical repair.  The patient understands they will receive post-surgical care and follow-up from the referring physician's office.
Oculoplastic Surgeon Procedure Text (B): After obtaining clear surgical margins the patient was sent to oculoplastics for surgical repair.  The patient understands they will receive post-surgical care and follow-up from the referring physician's office.
Otolaryngologist Procedure Text (A): After obtaining clear surgical margins the patient was sent to otolaryngology for surgical repair.  The patient understands they will receive post-surgical care and follow-up from the referring physician's office.
Otolaryngologist Procedure Text (B): After obtaining clear surgical margins the patient was sent to otolaryngology for surgical repair.  The patient understands they will receive post-surgical care and follow-up from the referring physician's office.
Plastic Surgeon Procedure Text (A): After obtaining clear surgical margins the patient was sent to plastics for surgical repair.  The patient understands they will receive post-surgical care and follow-up from the referring physician's office.
Plastic Surgeon Procedure Text (B): After obtaining clear surgical margins the patient was sent to plastics for surgical repair.  The patient understands they will receive post-surgical care and follow-up from the referring physician's office.
Mid-Level Procedure Text (A): After obtaining clear surgical margins the patient was sent to a mid-level provider for surgical repair.  The patient understands they will receive post-surgical care and follow-up from the mid-level provider.
Mid-Level Procedure Text (B): After obtaining clear surgical margins the patient was sent to a mid-level provider for surgical repair.  The patient understands they will receive post-surgical care and follow-up from the mid-level provider.
Provider Procedure Text (A): After obtaining clear surgical margins the defect was repaired by another provider.
Asc Procedure Text (A): After obtaining clear surgical margins the patient was sent to an ASC for surgical repair.  The patient understands they will receive post-surgical care and follow-up from the ASC physician.
Asc Procedure Text (B): After obtaining clear surgical margins the patient was sent to an ASC for surgical repair.  The patient understands they will receive post-surgical care and follow-up from the ASC physician.
Simple / Intermediate / Complex Repair - Final Wound Length In Cm: 4.8
Suturegard Retention Suture: 2-0 Nylon
Retention Suture Bite Size: 3 mm
Length To Time In Minutes Device Was In Place: 10
Hemigard Retention Suture: 4-0 Nylon
Distance Of Undermining In Cm (Required): 1.3
Undermining Type: Entire Wound
Debridement Text: The wound edges were debrided prior to proceeding with the closure to facilitate wound healing.
Helical Rim Text: The closure involved the helical rim.
Vermilion Border Text: The closure involved the vermilion border.
Nostril Rim Text: The closure involved the nostril rim.
Retention Suture Text: Retention sutures were placed to support the closure and prevent dehiscence.  Retention sutures by definition are used to relieve pressure on the primary suture line and to decrease the potential for wound dehiscence.
Secondary Defect Length In Cm (Required For Flaps): 0
Location Indication Override (Is Already Calculated Based On Selected Body Location): Area M
Area H Indication Text: Tumors in this location are included in Area H (eyelids, eyebrows, nose, lips, chin, ear, pre-auricular, post-auricular, temple, genitalia, hands, feet, ankles and areola).  Tissue conservation is critical in these anatomic locations.
Area M Indication Text: Tumors in this location are included in Area M (cheek, forehead, scalp, neck, jawline and pretibial skin).  Mohs surgery is indicated for tumors in these anatomic locations.
Area L Indication Text: Tumors in this location are included in Area L (trunk and extremities).  Mohs surgery is indicated for larger tumors, or tumors with aggressive histologic features, in these anatomic locations.
Tumor Debulked?: curette
Depth Of Tumor Invasion (For Histology): tumor not visualized
Perineural Invasion (For Histology - Be Specific If Possible): absent
Special Stains Stage 1 - Results: Base On Clearance Noted Above
Stage 2: Additional Anesthesia Type: 1% lidocaine with epinephrine and a 1:10 solution of 8.4% sodium bicarbonate
Stage 3: Additional Anesthesia Type: 1% lidocaine with epinephrine
Staging Info: By selecting yes to the question above you will include information on AJCC 8 tumor staging in your Mohs note. Information on tumor staging will be automatically added for SCCs on the head and neck. AJCC 8 includes tumor size, tumor depth, perineural involvement and bone invasion.
Tumor Depth: Less than 6mm from granular layer and no invasion beyond the subcutaneous fat
Why Was The Change Made?: Please Select the Appropriate Response
Medical Necessity Statement: Based on my medical judgement, Mohs surgery is the most appropriate treatment for this cancer compared to other treatments.
Alternatives Discussed Intro (Do Not Add Period): I discussed alternative treatments to Mohs surgery and specifically discussed the risks and benefits of
Consent 1/Introductory Paragraph: The rationale for Mohs was explained to the patient and consent was obtained. The risks, benefits and alternatives to therapy were discussed in detail. Specifically, the risks of infection, scarring, bleeding, prolonged wound healing, incomplete removal, allergy to anesthesia, nerve injury and recurrence were addressed. Prior to the procedure, the treatment site was clearly identified and confirmed by the patient. All components of Universal Protocol/PAUSE Rule completed.
Consent 2/Introductory Paragraph: Lip Consent: Mohs surgery was explained to the patient and consent was obtained. The risks, benefits and alternatives to therapy were discussed in detail. Specifically, the risks of infection, scarring, bleeding, prolonged wound healing, incomplete removal, allergy to anesthesia, nerve injury and recurrence were addressed. Risks of eclabium, oral incontinence, lip asymmetry and numbness all of which may be permanent explained to the patient who verbalized understanding. Potential need for further corrective procedures was explained to the patient who verbalized understanding.Prior to the procedure, the treatment site was clearly identified and confirmed by the patient. All components of Universal Protocol/PAUSE Rule completed.
Consent 3/Introductory Paragraph: I gave the patient a chance to ask questions they had about the procedure.  Following this I explained the Mohs procedure and consent was obtained. The risks, benefits and alternatives to therapy were discussed in detail. Specifically, the risks of infection, scarring, bleeding, prolonged wound healing, incomplete removal, allergy to anesthesia, nerve injury and recurrence were addressed. Prior to the procedure, the treatment site was clearly identified and confirmed by the patient. All components of Universal Protocol/PAUSE Rule completed.
Consent (Temporal Branch)/Introductory Paragraph: The rationale for Mohs was explained to the patient and consent was obtained. The risks, benefits and alternatives to therapy were discussed in detail. Specifically, the risks of damage to the temporal branch of the facial nerve, infection, scarring, bleeding, prolonged wound healing, incomplete removal, allergy to anesthesia, and recurrence were addressed. Prior to the procedure, the treatment site was clearly identified and confirmed by the patient. All components of Universal Protocol/PAUSE Rule completed.
Consent (Marginal Mandibular)/Introductory Paragraph: The rationale for Mohs was explained to the patient and consent was obtained. The risks, benefits and alternatives to therapy were discussed in detail. Specifically, the risks of damage to the marginal mandibular branch of the facial nerve, infection, scarring, bleeding, prolonged wound healing, incomplete removal, allergy to anesthesia, and recurrence were addressed. Prior to the procedure, the treatment site was clearly identified and confirmed by the patient. All components of Universal Protocol/PAUSE Rule completed.
Consent (Spinal Accessory)/Introductory Paragraph: The rationale for Mohs was explained to the patient and consent was obtained. The risks, benefits and alternatives to therapy were discussed in detail. Specifically, the risks of damage to the spinal accessory nerve, infection, scarring, bleeding, prolonged wound healing, incomplete removal, allergy to anesthesia, and recurrence were addressed. Prior to the procedure, the treatment site was clearly identified and confirmed by the patient. All components of Universal Protocol/PAUSE Rule completed.
Consent (Near Eyelid Margin)/Introductory Paragraph: The rationale for Mohs was explained to the patient and consent was obtained. The risks, benefits and alternatives to therapy were discussed in detail. Specifically, the risks of ectropion or eyelid deformity, infection, scarring, bleeding, prolonged wound healing, incomplete removal, allergy to anesthesia, nerve injury and recurrence were addressed. Prior to the procedure, the treatment site was clearly identified and confirmed by the patient. All components of Universal Protocol/PAUSE Rule completed.
Consent (Ear)/Introductory Paragraph: The rationale for Mohs was explained to the patient and consent was obtained. The risks, benefits and alternatives to therapy were discussed in detail. Specifically, the risks of ear deformity, infection, scarring, bleeding, prolonged wound healing, incomplete removal, allergy to anesthesia, nerve injury and recurrence were addressed. Prior to the procedure, the treatment site was clearly identified and confirmed by the patient. All components of Universal Protocol/PAUSE Rule completed.
Consent (Nose)/Introductory Paragraph: The rationale for Mohs was explained to the patient and consent was obtained. The risks, benefits and alternatives to therapy were discussed in detail. Specifically, the risks of nasal deformity, changes in the flow of air through the nose, infection, scarring, bleeding, prolonged wound healing, incomplete removal, allergy to anesthesia, nerve injury and recurrence were addressed. Prior to the procedure, the treatment site was clearly identified and confirmed by the patient. All components of Universal Protocol/PAUSE Rule completed.
Consent (Lip)/Introductory Paragraph: The rationale for Mohs was explained to the patient and consent was obtained. The risks, benefits and alternatives to therapy were discussed in detail. Specifically, the risks of lip deformity, changes in the oral aperture, infection, scarring, bleeding, prolonged wound healing, incomplete removal, allergy to anesthesia, nerve injury and recurrence were addressed. Prior to the procedure, the treatment site was clearly identified and confirmed by the patient. All components of Universal Protocol/PAUSE Rule completed.
Consent (Scalp)/Introductory Paragraph: The rationale for Mohs was explained to the patient and consent was obtained. The risks, benefits and alternatives to therapy were discussed in detail. Specifically, the risks of changes in hair growth pattern secondary to repair, infection, scarring, bleeding, prolonged wound healing, incomplete removal, allergy to anesthesia, nerve injury and recurrence were addressed. We discussed the risk of \"shock alopecia\" which can occur in about 10-15% of surgical cases on the scalp, and while temporary, it can last for 3-6 mos and sometimes up to a year or more. This is when the hair surrounding the surgery site stops growing and/or falls out within about an inch from the surgery site.  Rarely it can be more widespread though that is more common after general anesthesia.  Prior to the procedure, the treatment site was clearly identified and confirmed by the patient. All components of Universal Protocol/PAUSE Rule completed.
Detail Level: Detailed
Postop Diagnosis: same
Surgeon: Pastora Mckeon MD
Anesthesia Type: 0.5% lidocaine with 1:200,000 epinephrine
Anesthesia Volume In Cc: 2
Hemostasis: Pinpoint electrocautery
Estimated Blood Loss (Cc): minimal
Repair Anesthesia Method: local infiltration
Brow Lift Text: A midfrontal incision was made medially to the defect to allow access to the tissues just superior to the left eyebrow. Following careful dissection inferiorly in a supraperiosteal plane to the level of the left eyebrow, several 3-0 monocryl sutures were used to resuspend the eyebrow orbicularis oculi muscular unit to the superior frontal bone periosteum. This resulted in an appropriate reapproximation of static eyebrow symmetry and correction of the left brow ptosis.
Deep Sutures: 3-0 Maxon
Epidermal Sutures: 4-0 Prolene
Epidermal Closure: running
Suturegard Intro: Intraoperative tissue expansion was performed, utilizing the SUTUREGARD device, in order to reduce wound tension.
Suturegard Body: The suture ends were repeatedly re-tightened and re-clamped to achieve the desired tissue expansion.
Hemigard Intro: Due to skin fragility and wound tension, it was decided to use HEMIGARD adhesive retention suture devices to permit a linear closure. The skin was cleaned and dried for a 6cm distance away from the wound. Excessive hair, if present, was removed to allow for adhesion.
Hemigard Postcare Instructions: The HEMIGARD strips are to remain completely dry for at least 5-7 days.
Donor Site Anesthesia Type: same as repair anesthesia
Graft Basting Suture (Optional): 5-0 Chromic Gut
Graft Donor Site Dermal Sutures (Optional): 4-0 Monocryl
Graft Donor Site Bandage (Optional-Leave Blank If You Don't Want In Note): A pressure bandage was applied to the donor site.
Closure 2 Information: This tab is for additional flaps and grafts, including complex repair and grafts and complex repair and flaps. You can also specify a different location for the additional defect, if the location is the same you do not need to select a new one. We will insert the automated text for the repair you select below just as we do for solitary flaps and grafts. Please note that at this time if you select a location with a different insurance zone you will need to override the ICD10 and CPT if appropriate.
Closure 3 Information: This tab is for additional flaps and grafts above and beyond our usual structured repairs.  Please note if you enter information here it will not currently bill and you will need to add the billing information manually.
Closure 4 Information: This tab is for additional flaps and grafts above and beyond our usual structured repairs.  Please note if you enter information here it will not currently bill and you will need to add the billing information manually.
Wound Care: Vaseline
Dressing: pressure dressing with telfa
Wound Care (No Sutures): Petrolatum
Suture Removal: 14 days
Unna Boot Text: An Unna boot was placed to help immobilize the limb and facilitate more rapid healing.
Home Suture Removal Text: Patient was provided instructions on removing sutures and will remove their sutures at home.  If they have any questions or difficulties they will call the office.
Post-Care Instructions: I reviewed with the patient in detail post-care instructions. Patient is not to engage in any heavy lifting, exercise, or swimming for the next 14 days. Should the patient develop any fevers, chills, bleeding, severe pain patient will contact the office immediately.  Patient was given a detailed postop wound care sheet to take with them, complete with the office number and after-hours contact information.  \\n\\nPatient discharged in stable condition.
Opioid Counseling: 1.  Extended release opiates are only for moderate to severe pain when around-the-clock pain control is needed and other pain medications are not working. \\n2.  If these medications are used on a patient not used to taking narcotics, they may stop breathing.  Serious or fatal cases have happened even with the regular dose. Swallow the tablet whole, do not crush or chew the tablet.\\n3.  These medications have an increased risk of abuse, addiction and theft. Tell your doctor know if you or anyone in your family has a history of substance abuse. Store these medications in a safe place to prevent theft.\\n4.  If a child accidently takes even one tablet, they may stop breathing and die.\\n5.  Do not consume alcohol while taking these medications. Combining alcohol and extended release opioids may be fatal.\\n6.  When pregnant mothers use these medications, the unborn child is at risk.  The  may need prolonged withdrawal treatment.\\n7.  These medications may interact with other specific medications and cause serious side effects including death.  Tell your doctor and pharmacist all of your other medications before starting extended release opiates.\\n8.  Be aware, combining these medications with anxiety medications, such as diazepam or alprazolam, or other medications such as gabapentin, may cause extreme sleepiness, significant breathing difficulties, and death.
Pain Refusal Text: I offered to prescribe pain medication but the patient declined this medication.
Mauc Instructions: By selecting yes to the question below the MAUC number will be added into the note.  This will be calculated automatically based on the diagnosis chosen, the size entered, the body zone selected (H,M,L) and the specific indications you chose. You will also have the option to override the Mohs AUC if you disagree with the automatically calculated number and this option is found in the Case Summary tab.
Where Do You Want The Question To Include Opioid Counseling Located?: Medication Tab
Add Billing For Collagen Dressing (Endoform And Puracol) When Used?: Yes - Bill  (less than 16 square inches)
Eye Protection Verbiage: Before proceeding with the stage, a plastic scleral shield was inserted. The globe was anesthetized with a few drops of 0.5% tetracaine. Then, an appropriate sized scleral shield was chosen and coated with lacrilube ointment. The shield was gently inserted and left in place for the duration of each stage. After the stage was completed, the shield was gently removed.  If requested, lubricating eye drops were given to the patient to moisturize the eye.
Mohs Method Verbiage: An incision at a 45 degree angle following the standard Mohs approach was done and the specimen was harvested as a microscopic controlled layer.
Surgeon/Pathologist Verbiage (Will Incorporate Name Of Surgeon From Intro If Not Blank): operated in two distinct and integrated capacities as the surgeon and pathologist.
Mohs Histo Method Verbiage: Each section was then chromacoded and processed in the Mohs lab using the Mohs protocol and submitted for frozen section.
Subsequent Stages Histo Method Verbiage: Using a similar technique to that described above, a thin layer of tissue was removed from all areas where tumor was visible on the previous stage.  The tissue was again oriented, mapped, dyed, and processed as above.
Mohs Rapid Report Verbiage: The area of clinically evident tumor was marked with skin marking ink and appropriately hatched.  The initial incision was made following the Mohs approach through the skin.  The specimen was taken to the lab, divided into the necessary number of pieces, chromacoded and processed according to the Mohs protocol.  This was repeated in successive stages until a tumor free defect was achieved.
Complex Repair Preamble Text (Leave Blank If You Do Not Want): Extensive wide undermining was performed.
Intermediate Repair Preamble Text (Leave Blank If You Do Not Want): Undermining was performed with blunt dissection.
Non-Graft Cartilage Fenestration Text: The cartilage was fenestrated with a 2mm punch biopsy to help facilitate healing.
Graft Cartilage Fenestration Text: The cartilage was fenestrated with a 2mm punch biopsy to help facilitate graft survival and healing.
Secondary Intention Text (Leave Blank If You Do Not Want): The defect will heal with secondary intention.
No Repair - Repaired With Adjacent Surgical Defect Text (Leave Blank If You Do Not Want): After obtaining clear surgical margins the defect was repaired concurrently with another surgical defect which was in close approximation.
Adjacent Tissue Transfer Text: The defect edges were debeveled with a #15 scalpel blade.  Given the location of the defect and the proximity to free margins an adjacent tissue transfer was deemed most appropriate.  Using a sterile surgical marker, an appropriate flap was drawn incorporating the defect and placing the expected incisions within the relaxed skin tension lines where possible.    The area thus outlined was incised deep to adipose tissue with a #15 scalpel blade.  The skin margins were undermined to an appropriate distance in all directions utilizing iris scissors.
Advancement Flap (Single) Text: The defect edges were debeveled with a #15 scalpel blade.  Given the location of the defect and the proximity to free margins a single advancement flap was deemed most appropriate.  Using a sterile surgical marker, an appropriate advancement flap was drawn incorporating the defect and placing the expected incisions within the relaxed skin tension lines where possible.    The area thus outlined was incised deep to adipose tissue with a #15 scalpel blade.  The skin margins were undermined to an appropriate distance in all directions utilizing iris scissors.
Advancement Flap (Double) Text: The defect edges were debeveled with a #15 scalpel blade.  Given the location of the defect and the proximity to free margins a double advancement flap was deemed most appropriate.  Using a sterile surgical marker, the appropriate advancement flaps were drawn incorporating the defect and placing the expected incisions within the relaxed skin tension lines where possible.    The area thus outlined was incised deep to adipose tissue with a #15 scalpel blade.  The skin margins were undermined to an appropriate distance in all directions utilizing iris scissors.
Burow's Advancement Flap Text: The defect edges were debeveled with a #15 scalpel blade.  Given the location of the defect and the proximity to free margins a Burow's advancement flap was deemed most appropriate.  Using a sterile surgical marker, the appropriate advancement flap was drawn incorporating the defect and placing the expected incisions within the relaxed skin tension lines where possible.    The area thus outlined was incised deep to adipose tissue with a #15 scalpel blade.  The skin margins were undermined to an appropriate distance in all directions utilizing iris scissors.
Chonodrocutaneous Helical Advancement Flap Text: The defect edges were debeveled with a #15 scalpel blade.  Given the location of the defect and the proximity to free margins a chondrocutaneous helical advancement flap was deemed most appropriate.  Using a sterile surgical marker, the appropriate advancement flap was drawn incorporating the defect and placing the expected incisions within the relaxed skin tension lines where possible.    The area thus outlined was incised deep to adipose tissue with a #15 scalpel blade.  The skin margins were undermined to an appropriate distance in all directions utilizing iris scissors.
Crescentic Advancement Flap Text: The defect edges were debeveled with a #15 scalpel blade.  Given the location of the defect and the proximity to free margins a crescentic advancement flap was deemed most appropriate.  Using a sterile surgical marker, the appropriate advancement flap was drawn incorporating the defect and placing the expected incisions within the relaxed skin tension lines where possible.    The area thus outlined was incised deep to adipose tissue with a #15 scalpel blade.  The skin margins were undermined to an appropriate distance in all directions utilizing iris scissors.
A-T Advancement Flap Text: The defect edges were debeveled with a #15 scalpel blade.  Given the location of the defect, shape of the defect and the proximity to free margins an A-T advancement flap was deemed most appropriate.  Using a sterile surgical marker, an appropriate advancement flap was drawn incorporating the defect and placing the expected incisions within the relaxed skin tension lines where possible.    The area thus outlined was incised deep to adipose tissue with a #15 scalpel blade.  The skin margins were undermined to an appropriate distance in all directions utilizing iris scissors.
O-T Advancement Flap Text: The defect edges were debeveled with a #15 scalpel blade.  Given the location of the defect, shape of the defect and the proximity to free margins an O-T advancement flap was deemed most appropriate.  Using a sterile surgical marker, an appropriate advancement flap was drawn incorporating the defect and placing the expected incisions within the relaxed skin tension lines where possible.    The area thus outlined was incised deep to adipose tissue with a #15 scalpel blade.  The skin margins were undermined to an appropriate distance in all directions utilizing iris scissors.
O-L Flap Text: The defect edges were debeveled with a #15 scalpel blade.  Given the location of the defect, shape of the defect and the proximity to free margins an O-L flap was deemed most appropriate.  Using a sterile surgical marker, an appropriate advancement flap was drawn incorporating the defect and placing the expected incisions within the relaxed skin tension lines where possible.    The area thus outlined was incised deep to adipose tissue with a #15 scalpel blade.  The skin margins were undermined to an appropriate distance in all directions utilizing iris scissors.
O-Z Flap Text: The defect edges were debeveled with a #15 scalpel blade.  Given the location of the defect, shape of the defect and the proximity to free margins an O-Z flap was deemed most appropriate.  Using a sterile surgical marker, an appropriate transposition flap was drawn incorporating the defect and placing the expected incisions within the relaxed skin tension lines where possible. The area thus outlined was incised deep to adipose tissue with a #15 scalpel blade.  The skin margins were undermined to an appropriate distance in all directions utilizing iris scissors.
Double O-Z Flap Text: The defect edges were debeveled with a #15 scalpel blade.  Given the location of the defect, shape of the defect and the proximity to free margins a Double O-Z flap was deemed most appropriate.  Using a sterile surgical marker, an appropriate transposition flap was drawn incorporating the defect and placing the expected incisions within the relaxed skin tension lines where possible. The area thus outlined was incised deep to adipose tissue with a #15 scalpel blade.  The skin margins were undermined to an appropriate distance in all directions utilizing iris scissors.
V-Y Flap Text: The defect edges were debeveled with a #15 scalpel blade.  Given the location of the defect, shape of the defect and the proximity to free margins a V-Y flap was deemed most appropriate.  Using a sterile surgical marker, an appropriate advancement flap was drawn incorporating the defect and placing the expected incisions within the relaxed skin tension lines where possible.    The area thus outlined was incised deep to adipose tissue with a #15 scalpel blade.  The skin margins were undermined to an appropriate distance in all directions utilizing iris scissors.
Advancement-Rotation Flap Text: The defect edges were debeveled with a #15 scalpel blade.  Given the location of the defect, shape of the defect and the proximity to free margins an advancement-rotation flap was deemed most appropriate.  Using a sterile surgical marker, an appropriate flap was drawn incorporating the defect and placing the expected incisions within the relaxed skin tension lines where possible. The area thus outlined was incised deep to adipose tissue with a #15 scalpel blade.  The skin margins were undermined to an appropriate distance in all directions utilizing iris scissors.
Mercedes Flap Text: The defect edges were debeveled with a #15 scalpel blade.  Given the location of the defect, shape of the defect and the proximity to free margins a Mercedes flap was deemed most appropriate.  Using a sterile surgical marker, an appropriate advancement flap was drawn incorporating the defect and placing the expected incisions within the relaxed skin tension lines where possible. The area thus outlined was incised deep to adipose tissue with a #15 scalpel blade.  The skin margins were undermined to an appropriate distance in all directions utilizing iris scissors.
Modified Advancement Flap Text: The defect edges were debeveled with a #15 scalpel blade.  Given the location of the defect, shape of the defect and the proximity to free margins a modified advancement flap was deemed most appropriate.  Using a sterile surgical marker, an appropriate advancement flap was drawn incorporating the defect and placing the expected incisions within the relaxed skin tension lines where possible.    The area thus outlined was incised deep to adipose tissue with a #15 scalpel blade.  The skin margins were undermined to an appropriate distance in all directions utilizing iris scissors.
Mucosal Advancement Flap Text: Given the location of the defect, shape of the defect and the proximity to free margins a mucosal advancement flap was deemed most appropriate. Incisions were made with a 15 blade scalpel in the appropriate fashion along the cutaneous vermillion border and the mucosal lip. The remaining actinically damaged mucosal tissue was excised.  The mucosal advancement flap was then elevated to the gingival sulcus with care taken to preserve the neurovascular structures and advanced into the primary defect. Care was taken to ensure that precise realignment of the vermillion border was achieved.
Peng Advancement Flap Text: The defect edges were debeveled with a #15 scalpel blade.  Given the location of the defect, shape of the defect and the proximity to free margins a Peng advancement flap was deemed most appropriate.  Using a sterile surgical marker, an appropriate advancement flap was drawn incorporating the defect and placing the expected incisions within the relaxed skin tension lines where possible. The area thus outlined was incised deep to adipose tissue with a #15 scalpel blade.  The skin margins were undermined to an appropriate distance in all directions utilizing iris scissors.
Hatchet Flap Text: The defect edges were debeveled with a #15 scalpel blade.  Given the location of the defect, shape of the defect and the proximity to free margins a hatchet flap was deemed most appropriate.  Using a sterile surgical marker, an appropriate hatchet flap was drawn incorporating the defect and placing the expected incisions within the relaxed skin tension lines where possible.    The area thus outlined was incised deep to adipose tissue with a #15 scalpel blade.  The skin margins were undermined to an appropriate distance in all directions utilizing iris scissors.
Rotation Flap Text: The defect edges were debeveled with a #15 scalpel blade.  Given the location of the defect, shape of the defect and the proximity to free margins a rotation flap was deemed most appropriate.  Using a sterile surgical marker, an appropriate rotation flap was drawn incorporating the defect and placing the expected incisions within the relaxed skin tension lines where possible.    The area thus outlined was incised deep to adipose tissue with a #15 scalpel blade.  The skin margins were undermined to an appropriate distance in all directions utilizing iris scissors.
Spiral Flap Text: The defect edges were debeveled with a #15 scalpel blade.  Given the location of the defect, shape of the defect and the proximity to free margins a spiral flap was deemed most appropriate.  Using a sterile surgical marker, an appropriate rotation flap was drawn incorporating the defect and placing the expected incisions within the relaxed skin tension lines where possible. The area thus outlined was incised deep to adipose tissue with a #15 scalpel blade.  The skin margins were undermined to an appropriate distance in all directions utilizing iris scissors.
Staged Advancement Flap Text: The defect edges were debeveled with a #15 scalpel blade.  Given the location of the defect, shape of the defect and the proximity to free margins a staged advancement flap was deemed most appropriate.  Using a sterile surgical marker, an appropriate advancement flap was drawn incorporating the defect and placing the expected incisions within the relaxed skin tension lines where possible. The area thus outlined was incised deep to adipose tissue with a #15 scalpel blade.  The skin margins were undermined to an appropriate distance in all directions utilizing iris scissors.
Star Wedge Flap Text: The defect edges were debeveled with a #15 scalpel blade.  Given the location of the defect, shape of the defect and the proximity to free margins a star wedge flap was deemed most appropriate.  Using a sterile surgical marker, an appropriate rotation flap was drawn incorporating the defect and placing the expected incisions within the relaxed skin tension lines where possible. The area thus outlined was incised deep to adipose tissue with a #15 scalpel blade.  The skin margins were undermined to an appropriate distance in all directions utilizing iris scissors.
Transposition Flap Text: The defect edges were debeveled with a #15 scalpel blade.  Given the location of the defect and the proximity to free margins a transposition flap was deemed most appropriate.  Using a sterile surgical marker, an appropriate transposition flap was drawn incorporating the defect.    The area thus outlined was incised deep to adipose tissue with a #15 scalpel blade.  The skin margins were undermined to an appropriate distance in all directions utilizing iris scissors.
Muscle Hinge Flap Text: The defect edges were debeveled with a #15 scalpel blade.  Given the size, depth and location of the defect and the proximity to free margins a muscle hinge flap was deemed most appropriate.  Using a sterile surgical marker, an appropriate hinge flap was drawn incorporating the defect. The area thus outlined was incised with a #15 scalpel blade.  The skin margins were undermined to an appropriate distance in all directions utilizing iris scissors.
Mustarde Flap Text: The defect edges were debeveled with a #15 scalpel blade.  Given the size, depth and location of the defect and the proximity to free margins a Mustarde flap was deemed most appropriate.  Using a sterile surgical marker, an appropriate flap was drawn incorporating the defect. The area thus outlined was incised with a #15 scalpel blade.  The skin margins were undermined to an appropriate distance in all directions utilizing iris scissors paying careful attention to avoid injuring motor branches of the facial nerve. The weight of the flap was then tacked to the periosteum of the lateral orbital rim/zygomatic arch to support the flap and prevent ectropion. The remainder of the flap was then sutured into placed and the patient was able to demonstrate opening and closing of the mouth without ectropion or movement/downward pull of the lower lid.
Nasal Turnover Hinge Flap Text: The defect edges were debeveled with a #15 scalpel blade.  Given the size, depth, location of the defect and the defect being full thickness a nasal turnover hinge flap was deemed most appropriate.  Using a sterile surgical marker, an appropriate hinge flap was drawn incorporating the defect. The area thus outlined was incised with a #15 scalpel blade. The flap was designed to recreate the nasal mucosal lining and the alar rim. The skin margins were undermined to an appropriate distance in all directions utilizing iris scissors.
Nasalis-Muscle-Based Myocutaneous Island Pedicle Flap Text: Using a #15 blade, an incision was made around the donor flap to the level of the nasalis muscle. Wide lateral undermining was then performed in both the subcutaneous plane above the nasalis muscle, and in a submuscular plane just above periosteum. This allowed the formation of a free nasalis muscle axial pedicle (based on the angular artery) which was still attached to the actual cutaneous flap, increasing its mobility and vascular viability. Hemostasis was obtained with pinpoint electrocoagulation. The flap was mobilized into position and the pivotal anchor points positioned and stabilized with buried interrupted sutures. Subcutaneous and dermal tissues were closed in a multilayered fashion with sutures. Tissue redundancies were excised, and the epidermal edges were apposed without significant tension and sutured with sutures.
Orbicularis Oris Muscle Flap Text: The defect edges were debeveled with a #15 scalpel blade.  Given that the defect affected the competency of the oral sphincter an orbicularis oris muscle flap was deemed most appropriate to restore this competency and normal muscle function.  Using a sterile surgical marker, an appropriate flap was drawn incorporating the defect. The area thus outlined was incised with a #15 scalpel blade.
Melolabial Transposition Flap Text: The defect edges were debeveled with a #15 scalpel blade.  Given the location of the defect and the proximity to free margins a melolabial flap was deemed most appropriate.  Using a sterile surgical marker, an appropriate melolabial transposition flap was drawn incorporating the defect.    The area thus outlined was incised deep to adipose tissue with a #15 scalpel blade.  The skin margins were undermined to an appropriate distance in all directions utilizing iris scissors.
Rhombic Flap Text: The defect edges were debeveled with a #15 scalpel blade.  Given the location of the defect and the proximity to free margins a rhombic flap was deemed most appropriate.  Using a sterile surgical marker, an appropriate rhombic flap was drawn incorporating the defect.    The area thus outlined was incised deep to adipose tissue with a #15 scalpel blade.  The skin margins were undermined to an appropriate distance in all directions utilizing iris scissors.
Rhomboid Transposition Flap Text: The defect edges were debeveled with a #15 scalpel blade.  Given the location of the defect and the proximity to free margins a rhomboid transposition flap was deemed most appropriate.  Using a sterile surgical marker, an appropriate rhomboid flap was drawn incorporating the defect.    The area thus outlined was incised deep to adipose tissue with a #15 scalpel blade.  The skin margins were undermined to an appropriate distance in all directions utilizing iris scissors.
Bi-Rhombic Flap Text: The defect edges were debeveled with a #15 scalpel blade.  Given the location of the defect and the proximity to free margins a bi-rhombic flap was deemed most appropriate.  Using a sterile surgical marker, an appropriate rhombic flap was drawn incorporating the defect. The area thus outlined was incised deep to adipose tissue with a #15 scalpel blade.  The skin margins were undermined to an appropriate distance in all directions utilizing iris scissors.
Helical Rim Advancement Flap Text: The defect edges were debeveled with a #15 blade scalpel.  Given the location of the defect and the proximity to free margins (helical rim) a double helical rim advancement flap was deemed most appropriate.  Using a sterile surgical marker, the appropriate advancement flaps were drawn incorporating the defect and placing the expected incisions between the helical rim and antihelix where possible.  The area thus outlined was incised through and through with a #15 scalpel blade.  With a skin hook and iris scissors, the flaps were gently and sharply undermined and freed up.
Bilateral Helical Rim Advancement Flap Text: The defect edges were debeveled with a #15 blade scalpel.  Given the location of the defect and the proximity to free margins (helical rim) a bilateral helical rim advancement flap was deemed most appropriate.  Using a sterile surgical marker, the appropriate advancement flaps were drawn incorporating the defect and placing the expected incisions between the helical rim and antihelix where possible.  The area thus outlined was incised through and through with a #15 scalpel blade.  With a skin hook and iris scissors, the flaps were gently and sharply undermined and freed up.
Ear Star Wedge Flap Text: The defect edges were debeveled with a #15 blade scalpel.  Given the location of the defect and the proximity to free margins (helical rim) an ear star wedge flap was deemed most appropriate.  Using a sterile surgical marker, the appropriate flap was drawn incorporating the defect and placing the expected incisions between the helical rim and antihelix where possible.  The area thus outlined was incised through and through with a #15 scalpel blade.
Banner Transposition Flap Text: The defect edges were debeveled with a #15 scalpel blade.  Given the location of the defect and the proximity to free margins a Banner transposition flap was deemed most appropriate.  Using a sterile surgical marker, an appropriate flap drawn around the defect. The area thus outlined was incised deep to adipose tissue with a #15 scalpel blade.  The skin margins were undermined to an appropriate distance in all directions utilizing iris scissors.
Bilobed Flap Text: The defect edges were debeveled with a #15 scalpel blade.  Given the location of the defect and the proximity to free margins a bilobe flap was deemed most appropriate.  Using a sterile surgical marker, an appropriate bilobe flap drawn around the defect.    The area thus outlined was incised deep to adipose tissue with a #15 scalpel blade.  The skin margins were undermined to an appropriate distance in all directions utilizing iris scissors.
Bilobed Transposition Flap Text: The defect edges were debeveled with a #15 scalpel blade.  Given the location of the defect and the proximity to free margins a bilobed transposition flap was deemed most appropriate.  Using a sterile surgical marker, an appropriate bilobe flap drawn around the defect.    The area thus outlined was incised deep to adipose tissue with a #15 scalpel blade.  The skin margins were undermined to an appropriate distance in all directions utilizing iris scissors.
Trilobed Flap Text: The defect edges were debeveled with a #15 scalpel blade.  Given the location of the defect and the proximity to free margins a trilobed flap was deemed most appropriate.  Using a sterile surgical marker, an appropriate trilobed flap drawn around the defect.    The area thus outlined was incised deep to adipose tissue with a #15 scalpel blade.  The skin margins were undermined to an appropriate distance in all directions utilizing iris scissors.
Dorsal Nasal Flap Text: The defect edges were debeveled with a #15 scalpel blade.  Given the location of the defect and the proximity to free margins a dorsal nasal flap was deemed most appropriate.  Using a sterile surgical marker, an appropriate dorsal nasal flap was drawn around the defect.    The area thus outlined was incised deep to adipose tissue with a #15 scalpel blade.  The skin margins were undermined to an appropriate distance in all directions utilizing iris scissors.
Island Pedicle Flap Text: The defect edges were debeveled with a #15 scalpel blade.  Given the location of the defect, shape of the defect and the proximity to free margins an island pedicle advancement flap was deemed most appropriate.  Using a sterile surgical marker, an appropriate advancement flap was drawn incorporating the defect, outlining the appropriate donor tissue and placing the expected incisions within the relaxed skin tension lines where possible.    The area thus outlined was incised deep to adipose tissue with a #15 scalpel blade.  The skin margins were undermined to an appropriate distance in all directions around the primary defect and laterally outward around the island pedicle utilizing iris scissors.  There was minimal undermining beneath the pedicle flap.
Island Pedicle Flap With Canthal Suspension Text: The defect edges were debeveled with a #15 scalpel blade.  Given the location of the defect, shape of the defect and the proximity to free margins an island pedicle advancement flap was deemed most appropriate.  Using a sterile surgical marker, an appropriate advancement flap was drawn incorporating the defect, outlining the appropriate donor tissue and placing the expected incisions within the relaxed skin tension lines where possible. The area thus outlined was incised deep to adipose tissue with a #15 scalpel blade.  The skin margins were undermined to an appropriate distance in all directions around the primary defect and laterally outward around the island pedicle utilizing iris scissors.  There was minimal undermining beneath the pedicle flap. A suspension suture was placed in the canthal tendon to prevent tension and prevent ectropion.
Alar Island Pedicle Flap Text: The defect edges were debeveled with a #15 scalpel blade.  Given the location of the defect, shape of the defect and the proximity to the alar rim an island pedicle advancement flap was deemed most appropriate.  Using a sterile surgical marker, an appropriate advancement flap was drawn incorporating the defect, outlining the appropriate donor tissue and placing the expected incisions within the nasal ala running parallel to the alar rim. The area thus outlined was incised with a #15 scalpel blade.  The skin margins were undermined minimally to an appropriate distance in all directions around the primary defect and laterally outward around the island pedicle utilizing iris scissors.  There was minimal undermining beneath the pedicle flap.
Double Island Pedicle Flap Text: The defect edges were debeveled with a #15 scalpel blade.  Given the location of the defect, shape of the defect and the proximity to free margins a double island pedicle advancement flap was deemed most appropriate.  Using a sterile surgical marker, an appropriate advancement flap was drawn incorporating the defect, outlining the appropriate donor tissue and placing the expected incisions within the relaxed skin tension lines where possible.    The area thus outlined was incised deep to adipose tissue with a #15 scalpel blade.  The skin margins were undermined to an appropriate distance in all directions around the primary defect and laterally outward around the island pedicle utilizing iris scissors.  There was minimal undermining beneath the pedicle flap.
Island Pedicle Flap-Requiring Vessel Identification Text: The defect edges were debeveled with a #15 scalpel blade.  Given the location of the defect, shape of the defect and the proximity to free margins an island pedicle advancement flap was deemed most appropriate.  Using a sterile surgical marker, an appropriate advancement flap was drawn, based on the axial vessel mentioned above, incorporating the defect, outlining the appropriate donor tissue and placing the expected incisions within the relaxed skin tension lines where possible.    The area thus outlined was incised deep to adipose tissue with a #15 scalpel blade.  The skin margins were undermined to an appropriate distance in all directions around the primary defect and laterally outward around the island pedicle utilizing iris scissors.  There was minimal undermining beneath the pedicle flap.
Keystone Flap Text: The defect edges were debeveled with a #15 scalpel blade.  Given the location of the defect, shape of the defect a keystone flap was deemed most appropriate.  Using a sterile surgical marker, an appropriate keystone flap was drawn incorporating the defect, outlining the appropriate donor tissue and placing the expected incisions within the relaxed skin tension lines where possible. The area thus outlined was incised deep to adipose tissue with a #15 scalpel blade.  The skin margins were undermined to an appropriate distance in all directions around the primary defect and laterally outward around the flap utilizing iris scissors.
O-T Plasty Text: The defect edges were debeveled with a #15 scalpel blade.  Given the location of the defect, shape of the defect and the proximity to free margins an O-T plasty was deemed most appropriate.  Using a sterile surgical marker, an appropriate O-T plasty was drawn incorporating the defect and placing the expected incisions within the relaxed skin tension lines where possible.    The area thus outlined was incised deep to adipose tissue with a #15 scalpel blade.  The skin margins were undermined to an appropriate distance in all directions utilizing iris scissors.
O-Z Plasty Text: The defect edges were debeveled with a #15 scalpel blade.  Given the location of the defect, shape of the defect and the proximity to free margins an O-Z plasty (double transposition flap) was deemed most appropriate.  Using a sterile surgical marker, the appropriate transposition flaps were drawn incorporating the defect and placing the expected incisions within the relaxed skin tension lines where possible.    The area thus outlined was incised deep to adipose tissue with a #15 scalpel blade.  The skin margins were undermined to an appropriate distance in all directions utilizing iris scissors.  Hemostasis was achieved with electrocautery.  The flaps were then transposed into place, one clockwise and the other counterclockwise, and anchored with interrupted buried subcutaneous sutures.
Double O-Z Plasty Text: The defect edges were debeveled with a #15 scalpel blade.  Given the location of the defect, shape of the defect and the proximity to free margins a Double O-Z plasty (double transposition flap) was deemed most appropriate.  Using a sterile surgical marker, the appropriate transposition flaps were drawn incorporating the defect and placing the expected incisions within the relaxed skin tension lines where possible. The area thus outlined was incised deep to adipose tissue with a #15 scalpel blade.  The skin margins were undermined to an appropriate distance in all directions utilizing iris scissors.  Hemostasis was achieved with electrocautery.  The flaps were then transposed into place, one clockwise and the other counterclockwise, and anchored with interrupted buried subcutaneous sutures.
V-Y Plasty Text: The defect edges were debeveled with a #15 scalpel blade.  Given the location of the defect, shape of the defect and the proximity to free margins an V-Y advancement flap was deemed most appropriate.  Using a sterile surgical marker, an appropriate advancement flap was drawn incorporating the defect and placing the expected incisions within the relaxed skin tension lines where possible.    The area thus outlined was incised deep to adipose tissue with a #15 scalpel blade.  The skin margins were undermined to an appropriate distance in all directions utilizing iris scissors.
H Plasty Text: Given the location of the defect, shape of the defect and the proximity to free margins a H-plasty was deemed most appropriate for repair.  Using a sterile surgical marker, the appropriate advancement arms of the H-plasty were drawn incorporating the defect and placing the expected incisions within the relaxed skin tension lines where possible. The area thus outlined was incised deep to adipose tissue with a #15 scalpel blade. The skin margins were undermined to an appropriate distance in all directions utilizing iris scissors.  The opposing advancement arms were then advanced into place in opposite direction and anchored with interrupted buried subcutaneous sutures.
W Plasty Text: The lesion was extirpated to the level of the fat with a #15 scalpel blade.  Given the location of the defect, shape of the defect and the proximity to free margins a W-plasty was deemed most appropriate for repair.  Using a sterile surgical marker, the appropriate transposition arms of the W-plasty were drawn incorporating the defect and placing the expected incisions within the relaxed skin tension lines where possible.    The area thus outlined was incised deep to adipose tissue with a #15 scalpel blade.  The skin margins were undermined to an appropriate distance in all directions utilizing iris scissors.  The opposing transposition arms were then transposed into place in opposite direction and anchored with interrupted buried subcutaneous sutures.
Z Plasty Text: The lesion was extirpated to the level of the fat with a #15 scalpel blade.  Given the location of the defect, shape of the defect and the proximity to free margins a Z-plasty was deemed most appropriate for repair.  Using a sterile surgical marker, the appropriate transposition arms of the Z-plasty were drawn incorporating the defect and placing the expected incisions within the relaxed skin tension lines where possible.    The area thus outlined was incised deep to adipose tissue with a #15 scalpel blade.  The skin margins were undermined to an appropriate distance in all directions utilizing iris scissors.  The opposing transposition arms were then transposed into place in opposite direction and anchored with interrupted buried subcutaneous sutures.
Zygomaticofacial Flap Text: Given the location of the defect, shape of the defect and the proximity to free margins a zygomaticofacial flap was deemed most appropriate for repair.  Using a sterile surgical marker, the appropriate flap was drawn incorporating the defect and placing the expected incisions within the relaxed skin tension lines where possible. The area thus outlined was incised deep to adipose tissue with a #15 scalpel blade with preservation of a vascular pedicle.  The skin margins were undermined to an appropriate distance in all directions utilizing iris scissors.  The flap was then placed into the defect and anchored with interrupted buried subcutaneous sutures.
Cheek Interpolation Flap Text: A decision was made to reconstruct the defect utilizing an interpolation axial flap and a staged reconstruction.  A telfa template was made of the defect.  This telfa template was then used to outline the Cheek Interpolation flap.  The donor area for the pedicle flap was then injected with anesthesia.  The flap was excised through the skin and subcutaneous tissue down to the layer of the underlying musculature.  The interpolation flap was carefully excised within this deep plane to maintain its blood supply.  The edges of the donor site were undermined.   The donor site was closed in a primary fashion.  The pedicle was then rotated into position and sutured.  Once the tube was sutured into place, adequate blood supply was confirmed with blanching and refill.  The pedicle was then wrapped with xeroform gauze and dressed appropriately with a telfa and gauze bandage to ensure continued blood supply and protect the attached pedicle.
Cheek-To-Nose Interpolation Flap Text: A decision was made to reconstruct the defect utilizing an interpolation axial flap and a staged reconstruction.  A telfa template was made of the defect.  This telfa template was then used to outline the Cheek-To-Nose Interpolation flap.  The donor area for the pedicle flap was then injected with anesthesia.  The flap was excised through the skin and subcutaneous tissue down to the layer of the underlying musculature.  The interpolation flap was carefully excised within this deep plane to maintain its blood supply.  The edges of the donor site were undermined.   The donor site was closed in a primary fashion.  The pedicle was then rotated into position and sutured.  Once the tube was sutured into place, adequate blood supply was confirmed with blanching and refill.  The pedicle was then wrapped with xeroform gauze and dressed appropriately with a telfa and gauze bandage to ensure continued blood supply and protect the attached pedicle.
Interpolation Flap Text: A decision was made to reconstruct the defect utilizing an interpolation axial flap and a staged reconstruction.  A telfa template was made of the defect.  This telfa template was then used to outline the interpolation flap.  The donor area for the pedicle flap was then injected with anesthesia.  The flap was excised through the skin and subcutaneous tissue down to the layer of the underlying musculature.  The interpolation flap was carefully excised within this deep plane to maintain its blood supply.  The edges of the donor site were undermined.   The donor site was closed in a primary fashion.  The pedicle was then rotated into position and sutured.  Once the tube was sutured into place, adequate blood supply was confirmed with blanching and refill.  The pedicle was then wrapped with xeroform gauze and dressed appropriately with a telfa and gauze bandage to ensure continued blood supply and protect the attached pedicle.
Melolabial Interpolation Flap Text: A decision was made to reconstruct the defect utilizing an interpolation axial flap and a staged reconstruction.  A telfa template was made of the defect.  This telfa template was then used to outline the melolabial interpolation flap.  The donor area for the pedicle flap was then injected with anesthesia.  The flap was excised through the skin and subcutaneous tissue down to the layer of the underlying musculature.  The pedicle flap was carefully excised within this deep plane to maintain its blood supply.  The edges of the donor site were undermined.   The donor site was closed in a primary fashion.  The pedicle was then rotated into position and sutured.  Once the tube was sutured into place, adequate blood supply was confirmed with blanching and refill.  The pedicle was then wrapped with xeroform gauze and dressed appropriately with a telfa and gauze bandage to ensure continued blood supply and protect the attached pedicle.
Mastoid Interpolation Flap Text: A decision was made to reconstruct the defect utilizing an interpolation axial flap and a staged reconstruction.  A telfa template was made of the defect.  This telfa template was then used to outline the mastoid interpolation flap.  The donor area for the pedicle flap was then injected with anesthesia.  The flap was excised through the skin and subcutaneous tissue down to the layer of the underlying musculature.  The pedicle flap was carefully excised within this deep plane to maintain its blood supply.  The edges of the donor site were undermined.   The donor site was closed in a primary fashion.  The pedicle was then rotated into position and sutured.  Once the tube was sutured into place, adequate blood supply was confirmed with blanching and refill.  The pedicle was then wrapped with xeroform gauze and dressed appropriately with a telfa and gauze bandage to ensure continued blood supply and protect the attached pedicle.
Posterior Auricular Interpolation Flap Text: A decision was made to reconstruct the defect utilizing an interpolation axial flap and a staged reconstruction.  A telfa template was made of the defect.  This telfa template was then used to outline the posterior auricular interpolation flap.  The donor area for the pedicle flap was then injected with anesthesia.  The flap was excised through the skin and subcutaneous tissue down to the layer of the underlying musculature.  The pedicle flap was carefully excised within this deep plane to maintain its blood supply.  The edges of the donor site were undermined.   The donor site was closed in a primary fashion.  The pedicle was then rotated into position and sutured.  Once the tube was sutured into place, adequate blood supply was confirmed with blanching and refill.  The pedicle was then wrapped with xeroform gauze and dressed appropriately with a telfa and gauze bandage to ensure continued blood supply and protect the attached pedicle.
Paramedian Forehead Flap Text: A decision was made to reconstruct the defect utilizing an interpolation axial flap and a staged reconstruction.  A telfa template was made of the defect.  This telfa template was then used to outline the paramedian forehead pedicle flap.  The donor area for the pedicle flap was then injected with anesthesia.  The flap was excised through the skin and subcutaneous tissue down to the layer of the underlying musculature.  The pedicle flap was carefully excised within this deep plane to maintain its blood supply.  The edges of the donor site were undermined.   The donor site was closed in a primary fashion.  The pedicle was then rotated into position and sutured.  Once the tube was sutured into place, adequate blood supply was confirmed with blanching and refill.  The pedicle was then wrapped with xeroform gauze and dressed appropriately with a telfa and gauze bandage to ensure continued blood supply and protect the attached pedicle.
Abbe Flap (Upper To Lower Lip) Text: The defect of the lower lip was assessed and measured.  Given the location and size of the defect, an Abbe flap was deemed most appropriate.  Using a sterile surgical marker, an appropriate Abbe flap was measured and drawn on the upper lip. Local anesthesia was then infiltrated.  A scalpel was then used to incise the upper lip through and through the skin, vermilion, muscle and mucosa, leaving the flap pedicled on the opposite side.  The flap was then rotated and transferred to the lower lip defect.  The flap was then sutured into place with a three layer technique, closing the orbicularis oris muscle layer with subcutaneous buried sutures, followed by a mucosal layer and an epidermal layer.
Abbe Flap (Lower To Upper Lip) Text: The defect of the upper lip was assessed and measured.  Given the location and size of the defect, an Abbe flap was deemed most appropriate.  Using a sterile surgical marker, an appropriate Abbe flap was measured and drawn on the lower lip. Local anesthesia was then infiltrated. A scalpel was then used to incise the upper lip through and through the skin, vermilion, muscle and mucosa, leaving the flap pedicled on the opposite side.  The flap was then rotated and transferred to the lower lip defect.  The flap was then sutured into place with a three layer technique, closing the orbicularis oris muscle layer with subcutaneous buried sutures, followed by a mucosal layer and an epidermal layer.
Estlander Flap (Upper To Lower Lip) Text: The defect of the lower lip was assessed and measured.  Given the location and size of the defect, an Estlander flap was deemed most appropriate.  Using a sterile surgical marker, an appropriate Estlander flap was measured and drawn on the upper lip. Local anesthesia was then infiltrated. A scalpel was then used to incise the lateral aspect of the flap, through skin, muscle and mucosa, leaving the flap pedicled medially.  The flap was then rotated and positioned to fill the lower lip defect.  The flap was then sutured into place with a three layer technique, closing the orbicularis oris muscle layer with subcutaneous buried sutures, followed by a mucosal layer and an epidermal layer.
Estlander Flap (Lower To Upper Lip) Text: The defect of the lower lip was assessed and measured.  Given the location and size of the defect, an Estlander flap was deemed most appropriate.  Using a sterile surgical marker, an appropriate Estlander flap was measured and drawn on the upper lip. Local anesthesia was then infiltrated. A scalpel was then used to incise the lateral aspect of the flap, through skin, muscle and mucosa, leaving the flap pedicled medially.  The flap was then rotated and positioned to fill the lower lip defect.  The flap was then sutured into place with a three layer technique, closing the orbicularis oris muscle layer with subcutaneous buried sutures, followed by a mucosal layer and an epidermal layer.
Cheiloplasty (Less Than 50%) Text: A decision was made to reconstruct the defect with a  cheiloplasty.  The defect was undermined extensively.  Additional obicularis oris muscle was excised with a 15 blade scalpel.  The defect was converted into a full thickness wedge, of less than 50% of the vertical height of the lip, to facilite a better cosmetic result. The obicularis oris, superficial fascia, adipose and dermis were then reapproximated.  After the deeper layers were approximated the epidermis was reapproximated with particular care given to realign the vermillion border.
Cheiloplasty (Complex) Text: A decision was made to reconstruct the defect with a  cheiloplasty.  The defect was undermined extensively.  Additional obicularis oris muscle was excised with a 15 blade scalpel.  The defect was converted into a full thickness wedge to facilite a better cosmetic result.  The obicularis oris, superficial fascia, adipose and dermis were then reapproximated.  After the deeper layers were approximated the epidermis was reapproximated with particular care given to realign the vermillion border.
Ear Wedge Repair Text: A wedge excision was completed by carrying down an excision through the full thickness of the ear and cartilage with an inward facing Burow's triangle. The wound was then closed in a layered fashion.
Full Thickness Lip Wedge Repair (Flap) Text: Given the location of the defect and the proximity to free margins a full thickness wedge repair was deemed most appropriate.  Using a sterile surgical marker, the appropriate repair was drawn incorporating the defect and placing the expected incisions perpendicular to the vermillion border.  The vermillion border was also meticulously outlined to ensure appropriate reapproximation during the repair.  The area thus outlined was incised through and through with a #15 scalpel blade.  The muscularis and dermis were reaproximated with deep sutures following hemostasis. Care was taken to realign the vermillion border before proceeding with the superficial closure.  Once the vermillion was realigned the superfical and mucosal closure was finished.
Ftsg Text: The defect edges were debeveled with a #15 scalpel blade.  Given the location of the defect, shape of the defect and the proximity to free margins a full thickness skin graft was deemed most appropriate.  Using a sterile surgical marker, the primary defect shape was transferred to the donor site. The area thus outlined was incised deep to adipose tissue with a #15 scalpel blade.  The harvested graft was then trimmed of adipose tissue until only dermis and epidermis was left.  The skin margins of the secondary defect were undermined to an appropriate distance in all directions utilizing iris scissors.  The secondary defect was closed with interrupted buried subcutaneous sutures.  The skin edges were then re-apposed with running  sutures.  The skin graft was then placed in the primary defect and oriented appropriately.
Split-Thickness Skin Graft Text: The defect edges were debeveled with a #15 scalpel blade.  Given the location of the defect, shape of the defect and the proximity to free margins a split thickness skin graft was deemed most appropriate.  Using a sterile surgical marker, the primary defect shape was transferred to the donor site. The split thickness graft was then harvested.  The skin graft was then placed in the primary defect and oriented appropriately.
Burow's Graft Text: The defect edges were debeveled with a #15 scalpel blade.  Given the location of the defect, shape of the defect, the proximity to free margins and the presence of a standing cone deformity a Burow's skin graft was deemed most appropriate. The standing cone was removed and this tissue was then trimmed to the shape of the primary defect. The adipose tissue was also removed until only dermis and epidermis were left.  The skin margins of the secondary defect were undermined to an appropriate distance in all directions utilizing iris scissors.  The secondary defect was closed with interrupted buried subcutaneous sutures.  The skin edges were then re-apposed with running  sutures.  The skin graft was then placed in the primary defect and oriented appropriately.
Cartilage Graft Text: The defect edges were debeveled with a #15 scalpel blade.  Given the location of the defect, shape of the defect, the fact the defect involved a full thickness cartilage defect a cartilage graft was deemed most appropriate.  An appropriate donor site was identified, cleansed, and anesthetized. The cartilage graft was then harvested and transferred to the recipient site, oriented appropriately and then sutured into place.  The secondary defect was then repaired using a primary closure.
Composite Graft Text: The defect edges were debeveled with a #15 scalpel blade.  Given the location of the defect, shape of the defect, the proximity to free margins and the fact the defect was full thickness a composite graft was deemed most appropriate.  The defect was outline and then transferred to the donor site.  A full thickness graft was then excised from the donor site. The graft was then placed in the primary defect, oriented appropriately and then sutured into place.  The secondary defect was then repaired using a primary closure.
Epidermal Autograft Text: The defect edges were debeveled with a #15 scalpel blade.  Given the location of the defect, shape of the defect and the proximity to free margins an epidermal autograft was deemed most appropriate.  Using a sterile surgical marker, the primary defect shape was transferred to the donor site. The epidermal graft was then harvested.  The skin graft was then placed in the primary defect and oriented appropriately.
Dermal Autograft Text: The defect edges were debeveled with a #15 scalpel blade.  Given the location of the defect, shape of the defect and the proximity to free margins a dermal autograft was deemed most appropriate.  Using a sterile surgical marker, the primary defect shape was transferred to the donor site. The area thus outlined was incised deep to adipose tissue with a #15 scalpel blade.  The harvested graft was then trimmed of adipose and epidermal tissue until only dermis was left.  The skin graft was then placed in the primary defect and oriented appropriately.
Skin Substitute Text: The defect edges were debeveled with a #15 scalpel blade.  Given the location of the defect, shape of the defect and the proximity to free margins a skin substitute graft was deemed most appropriate.  The graft material was trimmed to fit the size of the defect. The graft was then placed in the primary defect and oriented appropriately.
Tissue Cultured Epidermal Autograft Text: The defect edges were debeveled with a #15 scalpel blade.  Given the location of the defect, shape of the defect and the proximity to free margins a tissue cultured epidermal autograft was deemed most appropriate.  The graft was then trimmed to fit the size of the defect.  The graft was then placed in the primary defect and oriented appropriately.
Xenograft Text: The defect edges were debeveled with a #15 scalpel blade.  Given the location of the defect, shape of the defect and the proximity to free margins a xenograft was deemed most appropriate.  The graft was then trimmed to fit the size of the defect.  The graft was then placed in the primary defect and oriented appropriately.
Purse String (Simple) Text: Given the location of the defect and the characteristics of the surrounding skin a pursestring closure was deemed most appropriate.  Undermining was performed circumfirentially around the surgical defect.  A purstring suture was then placed and tightened.
Purse String (Intermediate) Text: Given the location of the defect and the characteristics of the surrounding skin a pursestring intermediate closure was deemed most appropriate.  Undermining was performed circumfirentially around the surgical defect.  A purstring suture was then placed and tightened.
Partial Purse String (Simple) Text: Given the location of the defect and the characteristics of the surrounding skin a simple purse string closure was deemed most appropriate.  Undermining was performed circumfirentially around the surgical defect.  A purse string suture was then placed and tightened. Wound tension only allowed a partial closure of the circular defect.
Partial Purse String (Intermediate) Text: Given the location of the defect and the characteristics of the surrounding skin an intermediate purse string closure was deemed most appropriate.  Undermining was performed circumfirentially around the surgical defect.  A purse string suture was then placed and tightened. Wound tension only allowed a partial closure of the circular defect.
Localized Dermabrasion Text: The patient was draped in routine manner.  Localized dermabrasion using a combination of low level electrocautery, #3 curette and cotton-tipped applicators (for soft abrasion at the distal edges) was performed in routine manner to papillary dermis. This spot dermabrasion is being performed to complete skin cancer reconstruction. It also will eliminate the other sun damaged precancerous cells that are known to be part of the regional effect of a lifetime's worth of sun exposure. This localized dermabrasion is therapeutic and should not be considered cosmetic in any regard.
Tarsorrhaphy Text: A tarsorrhaphy was performed using Frost sutures.
Complex Repair And Flap Additional Text (Will Appearing After The Standard Complex Repair Text): The complex repair was not sufficient to completely close the primary defect. The remaining additional defect was repaired with the flap mentioned below.
Complex Repair And Graft Additional Text (Will Appearing After The Standard Complex Repair Text): The complex repair was not sufficient to completely close the primary defect. The remaining additional defect was repaired with the graft mentioned below.
Unique Flap 1 Name: Mercedes Advancement Flap
Unique Flap 2 Name: Tunneled Melolabial Pedicle Flap
Unique Flap 1 Text: The defect edges were debeveled with a #15 scalpel blade.  Given the location of the defect and the manner in which the tissue advanced easily, a Mercedes advancement flap was deemed most appropriate.  Using a sterile surgical marker, the appropriate advancement flaps were drawn incorporating the defect and placing the expected incisions within the relaxed skin tension lines where possible.    The area thus outlined was incised deep to adipose tissue with a #15 scalpel blade.  The skin margins were undermined to an appropriate distance in all directions utilizing iris scissors and #15 scalpel blade.
Unique Flap 2 Text: A template of the defect was designed and transposed in correct orientation along the melolabial fold. An extra 5 mm of pedicle length was provided given the anticipated shortening with rotation and tunneling into the defect. The epidermal portion of the proximal pedicle was removed, and the remainder of the pedicle was elevated at the level of the deep subcutaneous plane and including fibers of the superficial levator labii superioris alaeque nasi. Next, a subcutaneous tunnel was created using blunt dissection between the defect and the proximal donor defect. Careful attention was paid to preserving fibers of levator labii alaeque nasi and avoiding the local branches of the angular artery.\\n\\nAfter creating the tunnel, the pedicle was gently pulled through the tunnel into the defect and sutured into place using interrupted 5–0 gut sutures. The donor defect was then closed in a bilayered fashion. \\n\\nThe patient’s nasal contour had been nicely restored from a frontal and lateral view, and the patient had no compromise of the nasal valve.
Manual Repair Warning Statement: We plan on removing the manually selected variable below in favor of our much easier automatic structured text blocks found in the previous tab. We decided to do this to help make the flow better and give you the full power of structured data. Manual selection is never going to be ideal in our platform and I would encourage you to avoid using manual selection from this point on, especially since I will be sunsetting this feature. It is important that you do one of two things with the customized text below. First, you can save all of the text in a word file so you can have it for future reference. Second, transfer the text to the appropriate area in the Library tab. Lastly, if there is a flap or graft type which we do not have you need to let us know right away so I can add it in before the variable is hidden. No need to panic, we plan to give you roughly 6 months to make the change.
Same Histology In Subsequent Stages Text: The pattern and morphology of the tumor is as described in the first stage.
No Residual Tumor Seen Histology Text: No residual tumor seen at the margins.  There were no malignant cells seen in the sections examined.
Inflammation Suggestive Of Cancer Camouflage Histology Text: There was a dense lymphocytic infiltrate which prevented adequate histologic evaluation of adjacent structures.  This is suggestive of cancer camouflage.
Follicular Atypia Histology Text: Follicular atypia was noted.
Incidental Superficial Basal Cell Carcinoma Histology Text: An incidental superficial basal cell carcinoma was noted.
Incidental Squamous Cell Carcinoma In Situ Histology Text: An incidental focus of squamous cell carcinoma in situ was noted.
Incidental Actinic Keratosis Histology Text: An incidental actinic keratosis was noted.
Missing Epidermis Histology Text: There is a section of epidermis missing on the slide.
Bcc Histology Text: BCC: Nodular aggregates of basaloid tumor nests are seen.
Bcc Infiltrative Histology Text: Infiltrative BCC: There are aggregates of basaloid cells demonstrating an infiltrative pattern.
Bcc Infundibulocystic Histology Text: Infundibulocystic BCC: Nests are arranged in an anastomosing pattern and lack stroma. Many small, infundibular cyst-like structures with keratinous material are present.
Bcc Micronodular Histology Text: Micronodular BCC: Small, nodular aggregates of basaloid cells with minimal retraction are noted.
Bcc  Morpheaform/Sclerosing Histology Text: Morpheaform/Sclerosing BCC: Angulated narrow tumor nests of basaloid cells growing in an infiltrative manner within a fibrous stroma are noted.
Bcc  Nodular Histology Text: Nodular BCC: Atypical basaloid aggregates are noted with stromal retraction.
Bcc  Nodulocystic Histology Text: Nodulocystic BCC: Well-circumscribed cribriform cystic nodules of atypical basaloid cells.
Bcc Pigmented Histology Text: Pigmented BCC: Nodular basaloid nests with melanin pigment in the cytoplasm of melanocytes.
Bcc Superficial Histology Text: Superficial BCC: Small nests of tumor cells attached to the undersurface of the epidermis by a broad base are noted.
Mixed Superficial And Nodular Bcc Histology Text: Mixed superficial and nodular BCC: Small nests of tumor cells attached to the undersurface of the epidermis by a broad base are noted.  Nodular aggregates of basaloid tumor nests are also seen.
Mixed Nodular And Infiltrative Bcc Histology Text: Mixed nodular and infiltrative BCC: Nodular aggregates and angulated narrow tumor nests growing in an infiltrative manner are noted.
Mixed Nodular And Micronodular Bcc Histology Text: Mixed nodular and micronodular BCC: Nodular and small nodular aggregates of basaloid cells with retraction are noted.
Metatypical Bcc Histology Text: Metatypical BCC: Basaloid cells that are larger, paler, and rounder than those of a solid BCC. It also consists of squamoid cells and intermediate cells.
Fibroepithelioma Of Pinkus Histology Text: Fibroepithelioma of Pinkus: Thin, anastomosing strands of basaloid cells in a prominent stroma.
Scc Histology Text: SCC: There were aggregates of atypical keratinocytes noted.
Scc Well Differentiated Histology Text: Well-differentiated SCC: Irregular infiltration of dysplastic keratinocutes, some squamous pearls.
Scc Moderately Differentiated Histology Text: Moderately differentiated SCC: Irregular infiltration of dysplastic keratinocytes with moderate degree of differentiation.
Scc Poorly Differentiated Histology Text: Poorly differentiated SCC: Irregular nodular expansion of epithelium, non-keratinizing hyperchromatic cells and multinucleate cells with dyskeratosis and increased number of mitoses.
Scc Acantholytic Histology Text: Acantholytic SCC: Atypical acantholysis with free floating keratinocytes, dyskeratosis, mitotic figures.
Scc Ka Subtype Histology Text: SCC-KA subtype: Endophytic neoplasm with hyperkeratosis and digitate epidermal extensions, with keratinocyte pallor.
Scc Spindle Histology Text: Spindle SCC: Irregular spindle cell proliferation in a myxoid and inflamed stroma.
Scc In Situ Histology Text: SCCIS: Transepidermal keratinocyte atypia with acrosyringeal extension, dyskeratosis, parakeratosis.
Afx Histology Text: AFX: Anaplastic, multinucleate fibroblast-like spindled cells in a storiform and haphazard pattern.
Basosquamous Cell Carcinoma Histology Text: Basosquamous cell carcinoma: Basaloid cells that are a larger, paler, and rounder than those of a solid BCC. It also consists of squamoid cells and intermediate cells.
Dfsp Histology Text: DFSP: Proliferation of cytologically banal spindled cells arranged in a storiform pattern, infiltrating the subcutaneous adipose tissue in a honeycomb fashion.
Desmoplastic Trichoepithelioma Histology Text: Desmoplastic Trichoepithelioma: An infiltrating tumor which appears to be arising from the epidermis is noted. The tumor is comprised of small islands of basaloid epithelium superficially which form cords and strands towards the base and periphery.
Atypical Basaloid Neoplasm Histology Text: Atypical Basaloid Neoplasm: A collection of atypical basaloid cells and aggregates of cells are noted.
Microcystic Adnexal Carcinoma Histology Text: Superficially located keratocysts and variably sized tumor nests and ducts are noted. There appears to be diminution in size of the nests and cysts with the depth of dermal invasion.
Mucinous Carcinoma Histology Text: Mucinous Carcinoma: there are islands of bland basaloid cells arranged around ducts, floating in a pool of mucin surrounded by fibrous septae.
Sebaceous Carcinoma Histology Text: The tumor cells are basaloid with foci of sebocytic differentiation. Sebocytes are atypical large cells with a multivacuolated clear cytoplasm.  Mitoses are seen.
Mart-1 - Positive Histology Text: MART-1 staining demonstrates areas of higher density and clustering of melanocytes with Pagetoid spread upwards within the epidermis. The surgical margins are positive for tumor cells.  \\n---\\nCONTROL TISSUE: {Positive and negative controls were run simultaneously with patient specimens. The positive tissue control and negative tissue control stained as expected. The positive staining for MART-1 is cytoplasmic and dendritic for melanoma cells and melanocytes in skin. The same tissue used for the (+) control was used for the (-) control.}\\n…
Pan-Cytokeratin - Positive Histology Text: Pan-CK (AE1/AE3/C94/R226) staining demonstrates clusters of atypical squamous cells in the skin. The surgical margins are positive for tumor cells.\\n…\\nCONTROL TISSUE: {Positive and negative controls were run simultaneously with patient specimens. The positive tissue control and negative tissue control stained as expected. The positive staining for Pan-CK is expressed in the epidermis (suprabasal keratinocytes) and adnexal epithelium/hair follicles, as PanCK stains all epithelial tumors (SCC and adnexal tumors) and generally excludes melanocytic and hematopoietic tissue and inflammatory cells. The same tissue used for the (+) control was used for the (-) control. In the skin, there is an internal positive control that is noted with staining of the epidermis (suprabasal keratinocytes) and adnexal tissue such as hair follicles. An internal negative control for pan-CK is that it does not stain background inflammation or collagen/elastin.  Of note, AE1/AE3 pancytokeratin IHC stain may be negative in spindle cell SCC and other poorly differentiated SCCs - p63 may be a more useful IHC stain to highlight spindle cell SCC.}\\n…
Sox10 - Positive Histology Text: SOX-10 staining demonstrates areas of higher density and clustering of atypical melanocytes with Pagetoid spread upwards within the epidermis. The surgical margins are positive for tumor cells.  \\n…\\nCONTROL TISSUE: {Positive and negative controls were run simultaneously with patient specimens. The positive tissue control and negative tissue control stained as expected. The positive staining for SOX-10 is nuclear for melanoma cells and melanocytes in skin. The same tissue used for the (+) control was used for the (-) control.}\\n…
Mart-1 - Negative Histology Text: MART-1 staining demonstrates a normal density and pattern of melanocytes along the dermal-epidermal junction. The surgical margins are negative for tumor cells.\\n...\\nCONTROL TISSUE: {Positive and negative controls were run simultaneously with patient specimens. The positive tissue control and negative tissue control stained as expected. The positive staining for MART-1 is cytoplasmic and dendritic for melanoma cells and melanocytes in skin. The same tissue used for the (+) control was used for the (-) control.}\\n…
Pan-Cytokeratin - Negative Histology Text: Pan-CK (AE1/AE3/C94/R226) staining demonstrates normal distribution of keratinocyte staining and highlights the typical suprabasilar staining pattern.  No perineural or lymphovascular invasion is identified.  The margins are negative for tumor cells.\\n…\\nCONTROL TISSUE: {Positive and negative controls were run simultaneously with patient specimens. The positive tissue control and negative tissue control stained as expected. The positive staining for Pan-CK is expressed in the epidermis (suprabasal keratinocytes) and adnexal epithelium/hair follicles, as PanCK stains all epithelial tumors (SCC and adnexal tumors) and generally excludes melanocytic and hematopoietic tissue and inflammatory cells. The same tissue used for the (+) control was used for the (-) control. In the skin, there is an internal positive control that is noted with staining of the epidermis (suprabasal keratinocytes) and adnexal tissue such as hair follicles. An internal negative control for pan-CK is that it does not stain background inflammation or collagen/elastin.  Of note, AE1/AE3 pancytokeratin IHC stain may be negative in spindle cell SCC and other poorly differentiated SCCs - p63 may be a more useful IHC stain to highlight spindle cell SCC.}\\n…
Sox10 - Negative Histology Text: SOX-10 staining demonstrates a normal density and pattern of melanocytes along the dermal-epidermal junction. The surgical margins are negative for tumor cells.\\n...\\nCONTROL TISSUE: {Positive and negative controls were run simultaneously with patient specimens. The positive tissue control and negative tissue control stained as expected. The positive staining for SOX-10 is nuclear for melanoma cells and melanocytes in skin. The same tissue used for the (+) control was used for the (-) control.}\\n…
Clia Id #: [id number]
Mohs : Pastora Mckeon MD
Information: Selecting Yes will display possible errors in your note based on the variables you have selected. This validation is only offered as a suggestion for you. PLEASE NOTE THAT THE VALIDATION TEXT WILL BE REMOVED WHEN YOU FINALIZE YOUR NOTE. IF YOU WANT TO FAX A PRELIMINARY NOTE YOU WILL NEED TO TOGGLE THIS TO 'NO' IF YOU DO NOT WANT IT IN YOUR FAXED NOTE.

## 2023-01-11 NOTE — PROCEDURE: MIPS QUALITY
Quality 137: Melanoma: Continuity Of Care - Recall System: Documentation of system reason(s) for not entering patient's information into a recall system (eg, melanoma being monitored by another physician provider)
Name And Contact Information For Health Care Proxy: Thomas Duque
Quality 431: Preventive Care And Screening: Unhealthy Alcohol Use - Screening: Patient not identified as an unhealthy alcohol user when screened for unhealthy alcohol use using a systematic screening method
Detail Level: Detailed
Quality 265: Biopsy Follow-Up: Biopsy results reviewed, communicated, tracked, and documented
Quality 110: Preventive Care And Screening: Influenza Immunization: Influenza Immunization Administered during Influenza season
Quality 111:Pneumonia Vaccination Status For Older Adults: Pneumococcal vaccine (PPSV23) administered on or after patient’s 60th birthday and before the end of the measurement period
Quality 226: Preventive Care And Screening: Tobacco Use: Screening And Cessation Intervention: Patient screened for tobacco use and is an ex/non-smoker

## 2023-01-26 ENCOUNTER — APPOINTMENT (RX ONLY)
Dept: URBAN - METROPOLITAN AREA CLINIC 125 | Facility: CLINIC | Age: 78
Setting detail: DERMATOLOGY
End: 2023-01-26

## 2023-01-26 DIAGNOSIS — Z48.02 ENCOUNTER FOR REMOVAL OF SUTURES: ICD-10-CM

## 2023-01-26 PROCEDURE — 99024 POSTOP FOLLOW-UP VISIT: CPT

## 2023-01-26 PROCEDURE — ? SUTURE REMOVAL (GLOBAL PERIOD)

## 2023-01-26 ASSESSMENT — LOCATION ZONE DERM: LOCATION ZONE: LEG

## 2023-01-26 ASSESSMENT — LOCATION DETAILED DESCRIPTION DERM: LOCATION DETAILED: RIGHT DISTAL PRETIBIAL REGION

## 2023-01-26 ASSESSMENT — LOCATION SIMPLE DESCRIPTION DERM: LOCATION SIMPLE: RIGHT PRETIBIAL REGION

## 2023-01-26 NOTE — PROCEDURE: SUTURE REMOVAL (GLOBAL PERIOD)
Detail Level: Simple
Add 48194 Cpt? (Important Note: In 2017 The Use Of 73018 Is Being Tracked By Cms To Determine Future Global Period Reimbursement For Global Periods): yes

## 2023-01-26 NOTE — PROCEDURE: MIPS QUALITY
Quality 137: Melanoma: Continuity Of Care - Recall System: Documentation of system reason(s) for not entering patient's information into a recall system (eg, melanoma being monitored by another physician provider)
Name And Contact Information For Health Care Proxy: Thomas Duque
Quality 431: Preventive Care And Screening: Unhealthy Alcohol Use - Screening: Patient not identified as an unhealthy alcohol user when screened for unhealthy alcohol use using a systematic screening method
Detail Level: Detailed
Quality 110: Preventive Care And Screening: Influenza Immunization: Influenza Immunization Administered during Influenza season
Quality 111:Pneumonia Vaccination Status For Older Adults: Pneumococcal vaccine (PPSV23) administered on or after patient’s 60th birthday and before the end of the measurement period
Quality 226: Preventive Care And Screening: Tobacco Use: Screening And Cessation Intervention: Patient screened for tobacco use and is an ex/non-smoker